# Patient Record
Sex: MALE | Race: OTHER | NOT HISPANIC OR LATINO | URBAN - METROPOLITAN AREA
[De-identification: names, ages, dates, MRNs, and addresses within clinical notes are randomized per-mention and may not be internally consistent; named-entity substitution may affect disease eponyms.]

---

## 2020-06-23 ENCOUNTER — OFFICE VISIT (OUTPATIENT)
Dept: URBAN - METROPOLITAN AREA CLINIC 97 | Facility: CLINIC | Age: 70
End: 2020-06-23

## 2020-06-23 ENCOUNTER — OFFICE VISIT (OUTPATIENT)
Dept: URBAN - METROPOLITAN AREA CLINIC 86 | Facility: CLINIC | Age: 70
End: 2020-06-23

## 2020-07-28 ENCOUNTER — OFFICE VISIT (OUTPATIENT)
Dept: URBAN - METROPOLITAN AREA CLINIC 97 | Facility: CLINIC | Age: 70
End: 2020-07-28

## 2020-09-08 ENCOUNTER — OFFICE VISIT (OUTPATIENT)
Dept: URBAN - METROPOLITAN AREA CLINIC 97 | Facility: CLINIC | Age: 70
End: 2020-09-08
Payer: COMMERCIAL

## 2020-09-08 DIAGNOSIS — K74.69 CIRRHOSIS, CRYPTOGENIC: ICD-10-CM

## 2020-09-08 PROCEDURE — 76705 ECHO EXAM OF ABDOMEN: CPT

## 2020-09-08 PROCEDURE — 93975 VASCULAR STUDY: CPT

## 2020-09-08 RX ORDER — ASPIRIN 81 MG/1
TAKE 1 TABLET (81 MG) BY ORAL ROUTE ONCE DAILY TABLET, COATED ORAL 1
Qty: 0 | Refills: 0 | Status: ACTIVE | COMMUNITY
Start: 1900-01-01 | End: 1900-01-01

## 2020-09-08 RX ORDER — METOPROLOL TARTRATE 25 MG/1
TAKE 1 TABLET (25 MG) BY ORAL ROUTE ONCE DAILY TABLET, FILM COATED ORAL 1
Qty: 0 | Refills: 0 | Status: ACTIVE | COMMUNITY
Start: 1900-01-01 | End: 1900-01-01

## 2020-09-08 RX ORDER — LOSARTAN POTASSIUM 100 MG/1
TAKE 1 TABLET (100 MG) BY ORAL ROUTE ONCE DAILY TABLET, FILM COATED ORAL 1
Qty: 0 | Refills: 0 | Status: ACTIVE | COMMUNITY
Start: 1900-01-01 | End: 1900-01-01

## 2020-09-08 RX ORDER — ATORVASTATIN CALCIUM 40 MG/1
TAKE 1 TABLET (40 MG) BY ORAL ROUTE ONCE DAILY TABLET, FILM COATED ORAL 1
Qty: 0 | Refills: 0 | Status: ACTIVE | COMMUNITY
Start: 1900-01-01 | End: 1900-01-01

## 2020-09-08 RX ORDER — CLOPIDOGREL BISULFATE 75 MG
TAKE 1 TABLET (75 MG) BY ORAL ROUTE ONCE DAILY TABLET ORAL 1
Qty: 0 | Refills: 0 | Status: ACTIVE | COMMUNITY
Start: 1900-01-01 | End: 1900-01-01

## 2020-09-15 ENCOUNTER — TELEPHONE ENCOUNTER (OUTPATIENT)
Dept: URBAN - METROPOLITAN AREA CLINIC 92 | Facility: CLINIC | Age: 70
End: 2020-09-15

## 2020-09-15 NOTE — HPI-TODAY'S VISIT:
Dear Mr Lima,  [place] labs: na 141 k 4.6 and glucose 109 slightly up and maybe not fasting. bun 12 and cr 0. 85 and alb 4.7 normal. tb 1.2 up from 0.7 but not fractionated. ast 39 and alt 36 (ast 35 and alt 31 prior). ca 9.6 normal. wbc 6.4 hg 14.6 plat 79 low and prior 92 and prior 52 so variable but low generally. inr 1.08.  Meld 7 and meld na 7 so both low.   Look forward to seeing you soon.  Dr Brady

## 2020-09-22 ENCOUNTER — LAB OUTSIDE AN ENCOUNTER (OUTPATIENT)
Dept: URBAN - METROPOLITAN AREA CLINIC 86 | Facility: CLINIC | Age: 70
End: 2020-09-22

## 2020-09-22 ENCOUNTER — OFFICE VISIT (OUTPATIENT)
Dept: URBAN - METROPOLITAN AREA CLINIC 86 | Facility: CLINIC | Age: 70
End: 2020-09-22
Payer: COMMERCIAL

## 2020-09-22 ENCOUNTER — TELEPHONE ENCOUNTER (OUTPATIENT)
Dept: URBAN - METROPOLITAN AREA CLINIC 6 | Facility: CLINIC | Age: 70
End: 2020-09-22

## 2020-09-22 DIAGNOSIS — E78.5 HYPERLIPIDEMIA: ICD-10-CM

## 2020-09-22 DIAGNOSIS — I10 HYPERTENSION: ICD-10-CM

## 2020-09-22 DIAGNOSIS — Z12.11 SCREEN FOR COLON CANCER: ICD-10-CM

## 2020-09-22 DIAGNOSIS — M67.40 GANGLION CYST: ICD-10-CM

## 2020-09-22 DIAGNOSIS — C91.10 CLL (CHRONIC LYMPHOCYTIC LEUKEMIA): ICD-10-CM

## 2020-09-22 DIAGNOSIS — E55.9 VITAMIN D DEFICIENCY: ICD-10-CM

## 2020-09-22 DIAGNOSIS — K76.0 FATTY LIVER: ICD-10-CM

## 2020-09-22 DIAGNOSIS — Z71.89 VACCINE COUNSELING: ICD-10-CM

## 2020-09-22 DIAGNOSIS — Z87.09 HX OF INTRINSIC ASTHMA: ICD-10-CM

## 2020-09-22 DIAGNOSIS — K74.69 OTHER CIRRHOSIS OF LIVER: ICD-10-CM

## 2020-09-22 PROCEDURE — G8417 CALC BMI ABV UP PARAM F/U: HCPCS

## 2020-09-22 PROCEDURE — G8427 DOCREV CUR MEDS BY ELIG CLIN: HCPCS

## 2020-09-22 PROCEDURE — 99214 OFFICE O/P EST MOD 30 MIN: CPT

## 2020-09-22 RX ORDER — CLOPIDOGREL BISULFATE 75 MG
TAKE 1 TABLET (75 MG) BY ORAL ROUTE ONCE DAILY TABLET ORAL 1
Qty: 0 | Refills: 0 | Status: ACTIVE | COMMUNITY
Start: 1900-01-01

## 2020-09-22 RX ORDER — ATORVASTATIN CALCIUM 40 MG/1
TAKE 1 TABLET (40 MG) BY ORAL ROUTE ONCE DAILY TABLET, FILM COATED ORAL 1
Qty: 0 | Refills: 0 | Status: ACTIVE | COMMUNITY
Start: 1900-01-01

## 2020-09-22 RX ORDER — ASPIRIN 81 MG/1
TAKE 1 TABLET (81 MG) BY ORAL ROUTE ONCE DAILY TABLET, COATED ORAL 1
Qty: 0 | Refills: 0 | Status: DISCONTINUED | COMMUNITY
Start: 1900-01-01

## 2020-09-22 RX ORDER — METOPROLOL TARTRATE 25 MG/1
TAKE 1 TABLET (25 MG) BY ORAL ROUTE ONCE DAILY TABLET, FILM COATED ORAL 1
Qty: 0 | Refills: 0 | Status: ACTIVE | COMMUNITY
Start: 1900-01-01

## 2020-09-22 RX ORDER — LOSARTAN POTASSIUM 100 MG/1
TAKE 1 TABLET (100 MG) BY ORAL ROUTE ONCE DAILY TABLET, FILM COATED ORAL 1
Qty: 0 | Refills: 0 | Status: ACTIVE | COMMUNITY
Start: 1900-01-01

## 2020-09-22 NOTE — HPI-TODAY'S VISIT:
This is a scheduled follow-up appointment for this patient, a 70 year old Other Race male, after a previous visit on jan 2020 for an evaluation for elevated liver enzymes and and fatty liver . A copy of this document will be sent to the referring provider.       he did a recent u/s-cirrhosis noted on imaging. no lesions. he did a Ct ab yesterday: * Final Report *  Reason For Exam Splenomegaly; h/o CLL;Splenomegaly;Cirrhosis of liver  REPORT EXAM: CT Abdomen w/ IV Contrast  CLINICAL INDICATION: Splenomegaly; h/o CLL;Splenomegaly;Cirrhosis of liver.  TECHNIQUE: Following administration of non-ionic IV contrast, postcontrast images through the abdomen were obtained. If applicable, point-of-care testing was approved following departmental protocol. [email or web address]  COMPARISON: 1/28/2019  FINDINGS:  Lower Thorax: The visualized lung bases are clear. Punctate pleural calcification on the left posteriorly. Coronary artery calcification.  Liver: Hepatic steatosis with a nodular surface contour and fissural widening, unchanged.  Gallbladder/Biliary Tree: Cholelithiasis. No biliary ductal dilatation or gallbladder wall thickening.  Spleen: The spleen measures 13.1 cm in length, previously 11.6 cm at the same level.  Pancreas: Unremarkable.  Adrenal Glands: Unremarkable.   Kidneys/Ureters: Symmetric renal enhancement without obstruction.  Gastrointestinal: No bowel obstruction. Mild, nonspecific wall thickening in the gastric antral pyloric region.   Lymph Nodes: Multiple retroperitoneal lymph nodes are slightly larger. One left para-aortic node (5:33) measures 13 mm short axis, previously 9 mm. One aortocaval node (5:36) measures 9 mm short axis, previously 5 mm. Additional nodes are slightly larger.  Vessels: Moderate atherosclerosis.  Peritoneum/Retroperitoneum: No free fluid.  Bones/Soft Tissues: Osteopenia with degenerative and postsurgical changes in the spine.  IMPRESSION:   1. Slight increase in the size of the spleen. 2. Slight increased size of multiple retroperitoneal lymph nodes. 3. Nonspecific thickening of the gastric antral pylorus wall. 4. Hepatic steatosis with a nodular surface contour and fissural widening consistent with chronic liver disease. seeing dr. friend next week to discuss this. recommend he see dr. norton for Nonspecific thickening of the gastric antral pylorus wall.  labs 9/15/20 na 141 k 4.6 gluc 109 cr 0.85 tb 1.2 alt 36 ast 39 alp 84  may 2020 labs ast 35 alt 31 tb 0.7 MELD score 8   RECAP: dec 2019 u.s patent vessels and mildly inc resistive index in hepatic artery and nonspecific. Increased/coarsened and similar to before and no lesions. Right kdiney 11.5cm and pancreas unremarable where seen. Spleen 12.8cm.   Dr Hobbs did labs: jan 14 2020 na 141 and k 3.9 and cl 107 and glu 144 and cr 0.7 and alb 4.5 and tb 0.7 and ast 38 and alt 46 and alk 102 and wbc 5.6 hg 14.5 plat 86.  Nov 2 2019 ast 65 and alk 97 and alt 64.  Oct 8 2019 ast 35 and alt 39 and alk 79.   Not on any chemo.   June 5 2019  u.s liver heterogeneous in echotexture and lobulated.  No def mass. Patent vessels. Gb normal and no stones.  CBD 2mm and Right idney 11.5x5.1x5.8cm.  Spleen 13.7cm.  Cirrhotic liver with mild splenomenagly.   May 2019 wbc 7 hg 11.0 plat 103  and mono 1.1% glu 67 and cr 0.8 na 145 and k 4.7 and cl 108 and co2 21, alb 4.8 and tb 0.5 and alk 91 and ast 37 and alt 24.   March 25 2019 wbc 6.1 hg 11.6 littlelow and plat 94.  glu 77 and cr 0.78 and na 144 and k 5 and cl 108 and co2 23 and alb 4.5 and tb 0.4 and alk 106 and ast 30 and alt 20, ideal alt <30, Inr 1.1. and b core total positive.  May 15 2019 mri  Document Type: MRI Abdomen w/ + w/o Contrast Document Date: May 13, 2019 16:57  Document Status: Auth (Verified) Document Title: MRI Abdomen w/ + w/o Contrast Performed By: Liliana Zamorano  Verified By: Liilana Zamorano on May 14, 2019 08:40  Encounter info: [id number], [facility], Single Visit OP, 5/13/2019 - 5/13/2019  * Final Report *  Reason For Exam Cirrhosis  REPORT EXAM: MRI Abdomen w/ + w/o Contrast  CLINICAL INDICATION: Cirrhosis.  TECHNIQUE: Multisequence, multiplanar MRI of the abdomen was performed without and with intravenous contrast. [facility].[id number]   CONTRAST: 14 cc of Prohance  COMPARISON: CT 1/28/2019 and MRI 6/20/2018  FINDINGS:  Lower Thorax: Normal.  Liver:  Mild surface nodularity. No arterially enhancing lesions. No lesions with capsule or washout.  Calculated fat percentage is 4.5%. R2 water is 22.3.  Gallbladder/Biliary Tree: Normal.  Spleen: Normal.  Pancreas: Normal.  Adrenal Glands: Normal.   Kidneys/Ureters: Normal.  Gastrointestinal: Normal.   Lymph Nodes: Retroperitoneal lymph nodes remain slightly increased in number. No node measures greater than 1 cm in short axis. For example a left para-aortic node measures 0.9 x 2.0 cm (series 5, image 31) compared to 1.0 cm in short axis on CT (series 3, image 35).   Vessels: Portal vein and hepatic veins are patent. Small gastroesophageal varices are present.  Peritoneum/Retroperitoneum: No ascites.  Bones/Soft Tissues: Normal.  IMPRESSION:     1.  Mild morphologic changes of chronic liver disease and suggestion of portal hypertension including gastroesophageal varices. 2.  No evidence for hepatocellular carcinoma. 3.  No enlarging lymph nodes.   Signature Line *** Final ***  Electronically Signed By:  Liliana Zamorano on  05/14/2019 08:40  Dictated by:  Liliana Zamorano  Doug 15 2019 no gallstones and some sludge in gb. No stones. Liver homogeneous and no focal lesions. Partially imaged pancreas unremarkable. Right kidney 11.6cm and no hydronephrosis. Spleen 14.4cm. Vessels patent.  Doug 15 2019 na 140 k 4.3 and cl 106 and co2 25 and bun 13 and cr 0.73 and alb 4.7 and tb 0.9 and alk 90 and ast 33 and alt 28 and wbc 5.5 hg 15.6 plat 72. inr 1.03.  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 Doing well from the surgery. He did with Dr Lio Sesay and there for 5 days.  He was ct a and did well.  No heart issues and did well.  He is  still back to full time work.  Has leg pain limiting and says doctor referred for 2nd opinion.  Nov 2018 labs wbc 7 hg 15.6 plat plat 79 and glu 111 and cr 0.8 and na 141 and k 4.0 and alb 4.7. tb 0.5 and alk 112 and ast 39 and alt 44 and inr 1.0. Meld is 6 and meld Na 6 so very low.   2018 did the egd grade 1 varices seen with Dr Norton and was to redo in 1yr and did oct 2019 and 2 columns garde 1 varices and redo 1 yr.  Mri June 19 2018 liver normal size and very subtle surface nodularity and no suspicious lesions and few stone sin the gb and no sig portal htn.   June 2018 u/s with liver echogenic and ? mild fatty changes and we had by prelim report some concerns so that was why did mri.   May 2018 wbc 9.5 hg 16.3 plat 90 and na 138 and k 4.1 and bun 13 cr 0.8 and tb 0.8 and alk 122 and ast 45 and alt 49 and inr 1.1.   3/3017 he did a colonoscopy with Dr Tomlin and colon appeared normal. Few diverticula seen. Nonbleeding int hemorrhoids seen. Redo 5-10 yrs.   Feb 2018 wbc 8.9 hg 15.9 plat 110 and inr 1.1.   Jan 23 2018 mri: normal size liver and subtle hepatic surface nodularity. Liver fat 11% and and no duct dilation. Small possble esophageal varices seen and small collaterals on right upper abdomen. Top normal spleen 12.7cm. Multilevel degenerative changes. Trace abdominal fluid. Still on losartan as no sig fluid seen on last scan and cautioned could retain fluid with this.   Sept 2017 labs: wbc 7.9 hg 15.5 plat 107. Monocytes 1.2 little up. Glu 94 cr 0.76 nad na 140, k 3.9, alb 4.8, tb 0.7, alk 153 ast 60 and alt 56.   Alt ideal <30. iron 19% and alpha one normal 126 and MM. thee neg, and he was hep b immune at 63.9. asma neg at 16. ceruloplasmin 21 normal and ferritin 33 and hep a immune. u.s   Re his CLL  Dr Dr Hobbs is following.  Not on any tx for this yet.  July 2017 at [facility] and diffuse fatty liver and tiny gallstones and no inflammation.   Pt sees Dr Ward. Hga1c he says recently was in around 6 range.   2016 B sag neg and hcv ab neg.   He has been on the atorvastatin 40mg po qd for several years.   No one else in the family with fatty liver. Mother and father had heart disease.   Lali farah talked that about 80% of diabetics have fatty liver and if the labs normal about 1/3 can go to cirrhosis over time.   Goal is to continue the tx of the diabetes and lipids and work on the factors that made the liver fatty and monitor his liver over time.   He says his weight is stable.    discussed new meds for fatty liver coming in 2020 and we will need to see what the options are then and if he qualifies but phtn may be issue.  My hope is that he will continue to work on his rf and the liver will be better as he is doing.  Limit apap to <2gm per day.  Dr Hobbs aware that he is b core pos and if immune suppression is being done needs meds to prevent reactivation and if not then we can just watch.

## 2020-10-05 ENCOUNTER — OFFICE VISIT (OUTPATIENT)
Dept: URBAN - METROPOLITAN AREA TELEHEALTH 2 | Facility: TELEHEALTH | Age: 70
End: 2020-10-05
Payer: COMMERCIAL

## 2020-10-05 DIAGNOSIS — K76.0 FATTY LIVER: ICD-10-CM

## 2020-10-05 DIAGNOSIS — K31.89 INTESTINAL METAPLASIA OF GASTRIC CARDIA: ICD-10-CM

## 2020-10-05 DIAGNOSIS — K74.60 CIRRHOSIS: ICD-10-CM

## 2020-10-05 DIAGNOSIS — K57.90 DIVERTICULOSIS: ICD-10-CM

## 2020-10-05 PROCEDURE — G8482 FLU IMMUNIZE ORDER/ADMIN: HCPCS | Performed by: INTERNAL MEDICINE

## 2020-10-05 PROCEDURE — G9903 PT SCRN TBCO ID AS NON USER: HCPCS | Performed by: INTERNAL MEDICINE

## 2020-10-05 PROCEDURE — G8427 DOCREV CUR MEDS BY ELIG CLIN: HCPCS | Performed by: INTERNAL MEDICINE

## 2020-10-05 PROCEDURE — 99214 OFFICE O/P EST MOD 30 MIN: CPT | Performed by: INTERNAL MEDICINE

## 2020-10-05 PROCEDURE — G8417 CALC BMI ABV UP PARAM F/U: HCPCS | Performed by: INTERNAL MEDICINE

## 2020-10-05 PROCEDURE — 1036F TOBACCO NON-USER: CPT | Performed by: INTERNAL MEDICINE

## 2020-10-05 PROCEDURE — 3017F COLORECTAL CA SCREEN DOC REV: CPT | Performed by: INTERNAL MEDICINE

## 2020-10-05 RX ORDER — ATORVASTATIN CALCIUM 40 MG/1
TAKE 1 TABLET (40 MG) BY ORAL ROUTE ONCE DAILY TABLET, FILM COATED ORAL 1
Qty: 0 | Refills: 0 | Status: ACTIVE | COMMUNITY
Start: 1900-01-01

## 2020-10-05 RX ORDER — CLOPIDOGREL BISULFATE 75 MG
TAKE 1 TABLET (75 MG) BY ORAL ROUTE ONCE DAILY TABLET ORAL 1
Qty: 0 | Refills: 0 | Status: ACTIVE | COMMUNITY
Start: 1900-01-01

## 2020-10-05 RX ORDER — LOSARTAN POTASSIUM 100 MG/1
TAKE 1 TABLET (100 MG) BY ORAL ROUTE ONCE DAILY TABLET, FILM COATED ORAL 1
Qty: 0 | Refills: 0 | Status: ACTIVE | COMMUNITY
Start: 1900-01-01

## 2020-10-05 RX ORDER — METOPROLOL TARTRATE 25 MG/1
TAKE 1 TABLET (25 MG) BY ORAL ROUTE ONCE DAILY TABLET, FILM COATED ORAL 1
Qty: 0 | Refills: 0 | Status: ACTIVE | COMMUNITY
Start: 1900-01-01

## 2020-10-05 NOTE — HPI-TODAY'S VISIT:
70yoM seeing Dr. Brady for elevated liver enzymes and and fatty liver US last month-cirrhosis noted  CT ab 9/2020:Hepatic steatosis with a nodular surface contour and fissural widening, unchanged. Nonspecific thickening of the gastric antral pylorus wall. EGD 10/15/2019 Dr. Tomlin: non bleeding grade 1 varices + gastritis; bx H pylori gastritis + intestinal metaplasia He has a long hx of nausea since his heart surgery. No vomiting and not related to eating. No GERD sx or dysphagia. No constipation, diarrhea or GIB. Colonoscopy 3/2018 diverticulosis and IH.  No FH of GI malignancy Labs 9/2020 Plt 79 AST 39 ALT 36  TB 1.2 AP 84 Cardiologist is Dr. Singer at [facility] [phone] plavix

## 2020-10-12 ENCOUNTER — TELEPHONE ENCOUNTER (OUTPATIENT)
Dept: URBAN - METROPOLITAN AREA CLINIC 92 | Facility: CLINIC | Age: 70
End: 2020-10-12

## 2021-01-05 ENCOUNTER — OFFICE VISIT (OUTPATIENT)
Dept: URBAN - METROPOLITAN AREA SURGERY CENTER 18 | Facility: SURGERY CENTER | Age: 71
End: 2021-01-05
Payer: COMMERCIAL

## 2021-01-05 ENCOUNTER — CLAIMS CREATED FROM THE CLAIM WINDOW (OUTPATIENT)
Dept: URBAN - METROPOLITAN AREA CLINIC 4 | Facility: CLINIC | Age: 71
End: 2021-01-05
Payer: COMMERCIAL

## 2021-01-05 DIAGNOSIS — I85.10 ESOPH VARICE OTHER DIS: ICD-10-CM

## 2021-01-05 DIAGNOSIS — K74.69 CIRRHOSIS, CRYPTOGENIC: ICD-10-CM

## 2021-01-05 DIAGNOSIS — K29.60 OTHER GASTRITIS WITHOUT BLEEDING: ICD-10-CM

## 2021-01-05 DIAGNOSIS — K31.89 ACQUIRED DEFORMITY OF DUODENUM: ICD-10-CM

## 2021-01-05 PROCEDURE — 88312 SPECIAL STAINS GROUP 1: CPT | Performed by: PATHOLOGY

## 2021-01-05 PROCEDURE — 88305 TISSUE EXAM BY PATHOLOGIST: CPT | Performed by: PATHOLOGY

## 2021-01-05 PROCEDURE — G8907 PT DOC NO EVENTS ON DISCHARG: HCPCS | Performed by: INTERNAL MEDICINE

## 2021-01-05 PROCEDURE — 43239 EGD BIOPSY SINGLE/MULTIPLE: CPT | Performed by: INTERNAL MEDICINE

## 2021-01-05 RX ORDER — CLOPIDOGREL BISULFATE 75 MG
TAKE 1 TABLET (75 MG) BY ORAL ROUTE ONCE DAILY TABLET ORAL 1
Qty: 0 | Refills: 0 | Status: ACTIVE | COMMUNITY
Start: 1900-01-01

## 2021-01-05 RX ORDER — METOPROLOL TARTRATE 25 MG/1
TAKE 1 TABLET (25 MG) BY ORAL ROUTE ONCE DAILY TABLET, FILM COATED ORAL 1
Qty: 0 | Refills: 0 | Status: ACTIVE | COMMUNITY
Start: 1900-01-01

## 2021-01-05 RX ORDER — LOSARTAN POTASSIUM 100 MG/1
TAKE 1 TABLET (100 MG) BY ORAL ROUTE ONCE DAILY TABLET, FILM COATED ORAL 1
Qty: 0 | Refills: 0 | Status: ACTIVE | COMMUNITY
Start: 1900-01-01

## 2021-01-05 RX ORDER — ATORVASTATIN CALCIUM 40 MG/1
TAKE 1 TABLET (40 MG) BY ORAL ROUTE ONCE DAILY TABLET, FILM COATED ORAL 1
Qty: 0 | Refills: 0 | Status: ACTIVE | COMMUNITY
Start: 1900-01-01

## 2021-01-07 ENCOUNTER — OFFICE VISIT (OUTPATIENT)
Dept: URBAN - METROPOLITAN AREA CLINIC 98 | Facility: CLINIC | Age: 71
End: 2021-01-07

## 2021-03-23 ENCOUNTER — OFFICE VISIT (OUTPATIENT)
Dept: URBAN - METROPOLITAN AREA CLINIC 86 | Facility: CLINIC | Age: 71
End: 2021-03-23

## 2021-03-23 ENCOUNTER — OFFICE VISIT (OUTPATIENT)
Dept: URBAN - METROPOLITAN AREA CLINIC 91 | Facility: CLINIC | Age: 71
End: 2021-03-23

## 2021-04-27 ENCOUNTER — TELEPHONE ENCOUNTER (OUTPATIENT)
Dept: URBAN - METROPOLITAN AREA CLINIC 92 | Facility: CLINIC | Age: 71
End: 2021-04-27

## 2021-04-27 ENCOUNTER — OFFICE VISIT (OUTPATIENT)
Dept: URBAN - METROPOLITAN AREA CLINIC 97 | Facility: CLINIC | Age: 71
End: 2021-04-27
Payer: COMMERCIAL

## 2021-04-27 DIAGNOSIS — K76.0 FATTY LIVER: ICD-10-CM

## 2021-04-27 DIAGNOSIS — K76.89 ABNORMAL LIVER FUNCTION: ICD-10-CM

## 2021-04-27 DIAGNOSIS — K80.20 CHOLELITHIASIS: ICD-10-CM

## 2021-04-27 PROCEDURE — 76705 ECHO EXAM OF ABDOMEN: CPT | Performed by: PHYSICIAN ASSISTANT

## 2021-04-27 PROCEDURE — 93975 VASCULAR STUDY: CPT | Performed by: PHYSICIAN ASSISTANT

## 2021-04-27 RX ORDER — LOSARTAN POTASSIUM 100 MG/1
TAKE 1 TABLET (100 MG) BY ORAL ROUTE ONCE DAILY TABLET, FILM COATED ORAL 1
Qty: 0 | Refills: 0 | Status: ACTIVE | COMMUNITY
Start: 1900-01-01

## 2021-04-27 RX ORDER — ATORVASTATIN CALCIUM 40 MG/1
TAKE 1 TABLET (40 MG) BY ORAL ROUTE ONCE DAILY TABLET, FILM COATED ORAL 1
Qty: 0 | Refills: 0 | Status: ACTIVE | COMMUNITY
Start: 1900-01-01

## 2021-04-27 RX ORDER — CLOPIDOGREL BISULFATE 75 MG
TAKE 1 TABLET (75 MG) BY ORAL ROUTE ONCE DAILY TABLET ORAL 1
Qty: 0 | Refills: 0 | Status: ACTIVE | COMMUNITY
Start: 1900-01-01

## 2021-04-27 RX ORDER — METOPROLOL TARTRATE 25 MG/1
TAKE 1 TABLET (25 MG) BY ORAL ROUTE ONCE DAILY TABLET, FILM COATED ORAL 1
Qty: 0 | Refills: 0 | Status: ACTIVE | COMMUNITY
Start: 1900-01-01

## 2021-04-27 NOTE — HPI-TODAY'S VISIT:
Dear Adamaris Lima, April 27 ultrasound shows the liver to be coarsened with mild nodularity but no discrete liver lesion.  Gallstones are seen but without any gallbladder wall thickening or pericholecystic fluid.  Common bile duct is 2.4 mm.  Pancreas appeared normal to them.  Right kidney appeared to be normal in echogenicity with no renal mass.  Spleen was 12.8 cm.  Liver vessels were patent.  Overall the liver appeared to be coarsened with no focal lesion.  Spleen was upper normal.  I wanted to clarify the size of your right kidney and will await that correction. Dr. Brady

## 2021-04-29 ENCOUNTER — TELEPHONE ENCOUNTER (OUTPATIENT)
Dept: URBAN - METROPOLITAN AREA CLINIC 92 | Facility: CLINIC | Age: 71
End: 2021-04-29

## 2021-05-05 ENCOUNTER — OFFICE VISIT (OUTPATIENT)
Dept: URBAN - METROPOLITAN AREA TELEHEALTH 2 | Facility: TELEHEALTH | Age: 71
End: 2021-05-05
Payer: COMMERCIAL

## 2021-05-05 DIAGNOSIS — E55.9 VITAMIN D DEFICIENCY: ICD-10-CM

## 2021-05-05 DIAGNOSIS — K76.0 FATTY LIVER: ICD-10-CM

## 2021-05-05 DIAGNOSIS — K74.60 CIRRHOSIS: ICD-10-CM

## 2021-05-05 DIAGNOSIS — R74.8 ABNORMAL LIVER ENZYMES: ICD-10-CM

## 2021-05-05 PROCEDURE — 99214 OFFICE O/P EST MOD 30 MIN: CPT

## 2021-05-05 RX ORDER — CLOPIDOGREL BISULFATE 75 MG
TAKE 1 TABLET (75 MG) BY ORAL ROUTE ONCE DAILY TABLET ORAL 1
Qty: 0 | Refills: 0 | Status: ON HOLD | COMMUNITY
Start: 1900-01-01

## 2021-05-05 RX ORDER — LOSARTAN POTASSIUM 100 MG/1
TAKE 1 TABLET (100 MG) BY ORAL ROUTE ONCE DAILY TABLET, FILM COATED ORAL 1
Qty: 0 | Refills: 0 | Status: ACTIVE | COMMUNITY
Start: 1900-01-01

## 2021-05-05 RX ORDER — ATORVASTATIN CALCIUM 40 MG/1
TAKE 1 TABLET (40 MG) BY ORAL ROUTE ONCE DAILY TABLET, FILM COATED ORAL 1
Qty: 0 | Refills: 0 | Status: ON HOLD | COMMUNITY
Start: 1900-01-01

## 2021-05-05 RX ORDER — METOPROLOL TARTRATE 25 MG/1
TAKE 1 TABLET (25 MG) BY ORAL ROUTE ONCE DAILY TABLET, FILM COATED ORAL 1
Qty: 0 | Refills: 0 | Status: ACTIVE | COMMUNITY
Start: 1900-01-01

## 2021-05-05 RX ORDER — ATORVASTATIN CALCIUM 20 MG/1
1 TABLET TABLET, FILM COATED ORAL ONCE A DAY
Status: ACTIVE | COMMUNITY

## 2021-05-05 RX ORDER — DAPAGLIFLOZIN 5 MG/1
1 TABLET TABLET, FILM COATED ORAL ONCE A DAY
Status: ACTIVE | COMMUNITY

## 2021-05-05 NOTE — HPI-TODAY'S VISIT:
This is a scheduled follow-up appointment for this patient, a 70 year old Other Race male, after a previous visit for an evaluation for elevated liver enzymes and and fatty liver . A copy of this document will be sent to the referring provider.         did TH visit, but switched to telephone due to connectivity issues. pt had elevated glucose levels and added another med, lfts were elevated recently and suspect due to glucose levels. will have him repeat labs in 1 month and prior to visit.  They corrected the right kidney size to 11.5cm. did egd in jan 2021 and grade 1 varices noted.  per dr. garnett note recommend repeat in july 2021 and reminded pt of this.   labs 4/23/21 tb 1.1 alt 49 alp 84 ast 45  April 27 ultrasound shows the liver to be coarsened with mild nodularity but no discrete liver lesion.  Gallstones are seen but without any gallbladder wall thickening or pericholecystic fluid.  Common bile duct is 2.4 mm.  Pancreas appeared normal to them.  Right kidney appeared to be normal in echogenicity with no renal mass.  Spleen was 12.8 cm.  Liver vessels were patent.  Overall the liver appeared to be coarsened with no focal lesion.  Spleen was upper normal.  I wanted to clarify the size of your right kidney and will await that correction.  RECAP: he did a recent u/s-cirrhosis noted on imaging. no lesions.  past CT * Final Report *  Reason For Exam Splenomegaly; h/o CLL;Splenomegaly;Cirrhosis of liver  REPORT EXAM: CT Abdomen w/ IV Contrast  CLINICAL INDICATION: Splenomegaly; h/o CLL;Splenomegaly;Cirrhosis of liver.  TECHNIQUE: Following administration of non-ionic IV contrast, postcontrast images through the abdomen were obtained. If applicable, point-of-care testing was approved following departmental protocol. [facility].[email or web address]  COMPARISON: 1/28/2019  FINDINGS:  Lower Thorax: The visualized lung bases are clear. Punctate pleural calcification on the left posteriorly. Coronary artery calcification.  Liver: Hepatic steatosis with a nodular surface contour and fissural widening, unchanged.  Gallbladder/Biliary Tree: Cholelithiasis. No biliary ductal dilatation or gallbladder wall thickening.  Spleen: The spleen measures 13.1 cm in length, previously 11.6 cm at the same level.  Pancreas: Unremarkable.  Adrenal Glands: Unremarkable.   Kidneys/Ureters: Symmetric renal enhancement without obstruction.  Gastrointestinal: No bowel obstruction. Mild, nonspecific wall thickening in the gastric antral pyloric region.   Lymph Nodes: Multiple retroperitoneal lymph nodes are slightly larger. One left para-aortic node (5:33) measures 13 mm short axis, previously 9 mm. One aortocaval node (5:36) measures 9 mm short axis, previously 5 mm. Additional nodes are slightly larger.  Vessels: Moderate atherosclerosis.  Peritoneum/Retroperitoneum: No free fluid.  Bones/Soft Tissues: Osteopenia with degenerative and postsurgical changes in the spine.  IMPRESSION:   1. Slight increase in the size of the spleen. 2. Slight increased size of multiple retroperitoneal lymph nodes. 3. Nonspecific thickening of the gastric antral pylorus wall. 4. Hepatic steatosis with a nodular surface contour and fissural widening consistent with chronic liver disease.    labs 9/15/20 na 141 k 4.6 gluc 109 cr 0.85 tb 1.2 alt 36 ast 39 alp 84  may 2020 labs ast 35 alt 31 tb 0.7 MELD score 8

## 2021-05-28 LAB
A/G RATIO: 2.6
ALBUMIN: 4.7
ALKALINE PHOSPHATASE: 108
ALT (SGPT): 50
AST (SGOT): 53
BASO (ABSOLUTE): 0.1
BASOS: 1
BILIRUBIN, TOTAL: 0.7
BUN/CREATININE RATIO: 16
BUN: 12
CALCIUM: 9.8
CARBON DIOXIDE, TOTAL: 20
CHLORIDE: 105
CREATININE: 0.76
EGFR IF AFRICN AM: 108
EGFR IF NONAFRICN AM: 93
EOS (ABSOLUTE): 0.2
EOS: 4
GLOBULIN, TOTAL: 1.8
GLUCOSE: 130
HEMATOCRIT: 46.5
HEMATOLOGY COMMENTS:: (no result)
HEMOGLOBIN: 15.3
IMMATURE CELLS: (no result)
IMMATURE GRANS (ABS): 0
IMMATURE GRANULOCYTES: 0
INR: 1.1
LYMPHS (ABSOLUTE): 2.5
LYMPHS: 38
MCH: 28.1
MCHC: 32.9
MCV: 85
MONOCYTES(ABSOLUTE): 0.7
MONOCYTES: 11
NEUTROPHILS (ABSOLUTE): 3
NEUTROPHILS: 46
NRBC: (no result)
PLATELETS: 86
POTASSIUM: 4.1
PROTEIN, TOTAL: 6.5
PROTHROMBIN TIME: 11.6
RBC: 5.45
RDW: 14.5
SODIUM: 139
WBC: 6.6

## 2021-06-02 ENCOUNTER — LAB OUTSIDE AN ENCOUNTER (OUTPATIENT)
Dept: URBAN - METROPOLITAN AREA TELEHEALTH 2 | Facility: TELEHEALTH | Age: 71
End: 2021-06-02

## 2021-08-05 ENCOUNTER — LAB OUTSIDE AN ENCOUNTER (OUTPATIENT)
Dept: URBAN - METROPOLITAN AREA TELEHEALTH 2 | Facility: TELEHEALTH | Age: 71
End: 2021-08-05

## 2021-08-12 ENCOUNTER — OFFICE VISIT (OUTPATIENT)
Dept: URBAN - METROPOLITAN AREA TELEHEALTH 2 | Facility: TELEHEALTH | Age: 71
End: 2021-08-12

## 2021-08-30 ENCOUNTER — OFFICE VISIT (OUTPATIENT)
Dept: URBAN - METROPOLITAN AREA CLINIC 86 | Facility: CLINIC | Age: 71
End: 2021-08-30

## 2021-09-21 ENCOUNTER — OFFICE VISIT (OUTPATIENT)
Dept: URBAN - METROPOLITAN AREA CLINIC 97 | Facility: CLINIC | Age: 71
End: 2021-09-21

## 2021-09-22 ENCOUNTER — LAB OUTSIDE AN ENCOUNTER (OUTPATIENT)
Dept: URBAN - METROPOLITAN AREA CLINIC 86 | Facility: CLINIC | Age: 71
End: 2021-09-22

## 2021-09-23 LAB
A/G RATIO: 2.2
ALBUMIN: 4.8
ALKALINE PHOSPHATASE: 116
ALT (SGPT): 39
AST (SGOT): 47
BASO (ABSOLUTE): 0.1
BASOS: 1
BILIRUBIN, TOTAL: 0.7
BUN/CREATININE RATIO: 14
BUN: 11
CALCIUM: 9.7
CARBON DIOXIDE, TOTAL: 21
CHLORIDE: 104
CREATININE: 0.79
EGFR IF AFRICN AM: 105
EGFR IF NONAFRICN AM: 91
EOS (ABSOLUTE): 0.3
EOS: 3
GLOBULIN, TOTAL: 2.2
GLUCOSE: 118
HEMATOCRIT: 50.8
HEMATOLOGY COMMENTS:: (no result)
HEMOGLOBIN: 16.2
IMMATURE CELLS: (no result)
IMMATURE GRANS (ABS): 0
IMMATURE GRANULOCYTES: 0
INR: 1.1
LYMPHS (ABSOLUTE): 3.1
LYMPHS: 35
MCH: 27.2
MCHC: 31.9
MCV: 85
MONOCYTES(ABSOLUTE): 0.9
MONOCYTES: 10
NEUTROPHILS (ABSOLUTE): 4.4
NEUTROPHILS: 51
NRBC: (no result)
PLATELETS: 63
POTASSIUM: 4.5
PROTEIN, TOTAL: 7
PROTHROMBIN TIME: 11.3
RBC: 5.95
RDW: 15.4
SODIUM: 144
WBC: 8.7

## 2021-09-27 ENCOUNTER — OFFICE VISIT (OUTPATIENT)
Dept: URBAN - METROPOLITAN AREA CLINIC 86 | Facility: CLINIC | Age: 71
End: 2021-09-27
Payer: COMMERCIAL

## 2021-09-27 VITALS
WEIGHT: 152.5 LBS | HEIGHT: 65 IN | SYSTOLIC BLOOD PRESSURE: 117 MMHG | TEMPERATURE: 91.7 F | DIASTOLIC BLOOD PRESSURE: 68 MMHG | BODY MASS INDEX: 25.41 KG/M2 | HEART RATE: 58 BPM

## 2021-09-27 DIAGNOSIS — E55.9 VITAMIN D DEFICIENCY: ICD-10-CM

## 2021-09-27 DIAGNOSIS — C91.10 CLL (CHRONIC LYMPHOCYTIC LEUKEMIA): ICD-10-CM

## 2021-09-27 DIAGNOSIS — R93.3 ABNORMAL CT SCAN, STOMACH: ICD-10-CM

## 2021-09-27 DIAGNOSIS — Z87.09 HX OF INTRINSIC ASTHMA: ICD-10-CM

## 2021-09-27 DIAGNOSIS — Z12.11 SCREEN FOR COLON CANCER: ICD-10-CM

## 2021-09-27 DIAGNOSIS — K57.90 DIVERTICULOSIS: ICD-10-CM

## 2021-09-27 DIAGNOSIS — Z71.89 VACCINE COUNSELING: ICD-10-CM

## 2021-09-27 DIAGNOSIS — Z79.899 HIGH RISK MEDICATION USE: ICD-10-CM

## 2021-09-27 DIAGNOSIS — E66.9 OBESITY: ICD-10-CM

## 2021-09-27 DIAGNOSIS — E78.5 HYPERLIPIDEMIA: ICD-10-CM

## 2021-09-27 DIAGNOSIS — R74.8 ABNORMAL LIVER ENZYMES: ICD-10-CM

## 2021-09-27 DIAGNOSIS — D69.6 THROMBOCYTOPENIA: ICD-10-CM

## 2021-09-27 DIAGNOSIS — K74.60 CIRRHOSIS: ICD-10-CM

## 2021-09-27 DIAGNOSIS — I10 HYPERTENSION: ICD-10-CM

## 2021-09-27 DIAGNOSIS — K76.0 FATTY LIVER: ICD-10-CM

## 2021-09-27 DIAGNOSIS — M67.40 GANGLION CYST: ICD-10-CM

## 2021-09-27 DIAGNOSIS — K31.89 INTESTINAL METAPLASIA OF GASTRIC CARDIA: ICD-10-CM

## 2021-09-27 DIAGNOSIS — E11.9 DIABETES: ICD-10-CM

## 2021-09-27 PROCEDURE — 99214 OFFICE O/P EST MOD 30 MIN: CPT

## 2021-09-27 RX ORDER — LOSARTAN POTASSIUM 100 MG/1
TAKE 1 TABLET (100 MG) BY ORAL ROUTE ONCE DAILY TABLET, FILM COATED ORAL 1
Qty: 0 | Refills: 0 | Status: ACTIVE | COMMUNITY
Start: 1900-01-01

## 2021-09-27 RX ORDER — ATORVASTATIN CALCIUM 20 MG/1
1 TABLET TABLET, FILM COATED ORAL ONCE A DAY
Status: ACTIVE | COMMUNITY

## 2021-09-27 RX ORDER — CALCIUM CARBONATE 500(1250)
2 TABLETS TABLET ORAL ONCE A DAY
Status: ACTIVE | COMMUNITY

## 2021-09-27 RX ORDER — ATORVASTATIN CALCIUM 40 MG/1
TAKE 1 TABLET (40 MG) BY ORAL ROUTE ONCE DAILY TABLET, FILM COATED ORAL 1
Qty: 0 | Refills: 0 | Status: ON HOLD | COMMUNITY
Start: 1900-01-01

## 2021-09-27 RX ORDER — METOPROLOL TARTRATE 25 MG/1
TAKE 1 TABLET (25 MG) BY ORAL ROUTE ONCE DAILY TABLET, FILM COATED ORAL 1
Qty: 0 | Refills: 0 | Status: ACTIVE | COMMUNITY
Start: 1900-01-01

## 2021-09-27 RX ORDER — CLOPIDOGREL BISULFATE 75 MG
TAKE 1 TABLET (75 MG) BY ORAL ROUTE ONCE DAILY TABLET ORAL 1
Qty: 0 | Refills: 0 | Status: ON HOLD | COMMUNITY
Start: 1900-01-01

## 2021-09-27 RX ORDER — DAPAGLIFLOZIN 5 MG/1
1 TABLET TABLET, FILM COATED ORAL ONCE A DAY
Status: ACTIVE | COMMUNITY

## 2021-09-27 NOTE — HPI-TODAY'S VISIT:
This is a scheduled follow-up appointment for this patient, a 70 year old Other Race male, after a previous visit on 5/5/21 with Ann Mckeon PA-C for an evaluation for elevated liver enzymes and and fatty liver . A copy of this document will be sent to the referring provider.         Pt doing well and feels better than he has.  Reports increased activity.    Reports better control with DM since starting Farxiga.  A1c approximately 6% per pt.  Does not require insulin use anymore and will likely be stopping metformin soon.  Weight down 2 lbs since last OV. HLD controlled with lipitor 20 mg.   He does consume alcohol 1 drink per month, encouraged to stop alcohol entirely.   Received COVID Vaccine x 2 (moderna).  did egd in jan 2021 and grade 1 varices noted.  per dr. garnett note recommend repeat in july 2021 and reminded pt of this.   Last imaging was US 4/27/21- needs repeat in 6 months (Oct 2021).  Denies abdominal distention, weight gain, confusion, GI bleeding, fevers/chills, abdominal pain.     labs 9/22/21- glucose 118 elevated, creatinine 0.79, tbili 0.7, alk phos 116, ast 47 (prev 53), alt 39 (prev 50)- ideal is less than 35, wbc 8.7, rbc 5.95 (please show primary MD), platelets 63 (prev 86), inr 1.1, sodium 144, MELD 7, MELD-Na 7  labs 4/23/21 tb 1.1 alt 49 alp 84 ast 45  April 27 ultrasound shows the liver to be coarsened with mild nodularity but no discrete liver lesion.  Gallstones are seen but without any gallbladder wall thickening or pericholecystic fluid.  Common bile duct is 2.4 mm.  Pancreas appeared normal to them.  Right kidney appeared to be normal in echogenicity with no renal mass.  Spleen was 12.8 cm.  Liver vessels were patent.  Overall the liver appeared to be coarsened with no focal lesion.  Spleen was upper normal.  I wanted to clarify the size of your right kidney and will await that correction.   They corrected the right kidney size to 11.5cm.  RECAP: he did a recent u/s-cirrhosis noted on imaging. no lesions.  past CT * Final Report *  Reason For Exam Splenomegaly; h/o CLL;Splenomegaly;Cirrhosis of liver  REPORT EXAM: CT Abdomen w/ IV Contrast  CLINICAL INDICATION: Splenomegaly; h/o CLL;Splenomegaly;Cirrhosis of liver.  TECHNIQUE: Following administration of non-ionic IV contrast, postcontrast images through the abdomen were obtained. If applicable, point-of-care testing was approved following departmental protocol. [facility].1.7.1  COMPARISON: 1/28/2019  FINDINGS:  Lower Thorax: The visualized lung bases are clear. Punctate pleural calcification on the left posteriorly. Coronary artery calcification.  Liver: Hepatic steatosis with a nodular surface contour and fissural widening, unchanged.  Gallbladder/Biliary Tree: Cholelithiasis. No biliary ductal dilatation or gallbladder wall thickening.  Spleen: The spleen measures 13.1 cm in length, previously 11.6 cm at the same level.  Pancreas: Unremarkable.  Adrenal Glands: Unremarkable.   Kidneys/Ureters: Symmetric renal enhancement without obstruction.  Gastrointestinal: No bowel obstruction. Mild, nonspecific wall thickening in the gastric antral pyloric region.   Lymph Nodes: Multiple retroperitoneal lymph nodes are slightly larger. One left para-aortic node (5:33) measures 13 mm short axis, previously 9 mm. One aortocaval node (5:36) measures 9 mm short axis, previously 5 mm. Additional nodes are slightly larger.  Vessels: Moderate atherosclerosis.  Peritoneum/Retroperitoneum: No free fluid.  Bones/Soft Tissues: Osteopenia with degenerative and postsurgical changes in the spine.  IMPRESSION:   1. Slight increase in the size of the spleen. 2. Slight increased size of multiple retroperitoneal lymph nodes. 3. Nonspecific thickening of the gastric antral pylorus wall. 4. Hepatic steatosis with a nodular surface contour and fissural widening consistent with chronic liver disease.    labs 9/15/20 na 141 k 4.6 gluc 109 cr 0.85 tb 1.2 alt 36 ast 39 alp 84  may 2020 labs ast 35 alt 31 tb 0.7 MELD score 8

## 2021-09-27 NOTE — EXAM-PHYSICAL EXAM
General: pleasant, well developed, well nourished, no acute distress, non ill appearing Eyes- no scleral icterus HENT: normocephalic, atraumatic head, face mask covering mouth and nose Neck: supple, no JVD Chest: normal breath sounds, normal work of breathing Heart: regular rate and rhythm without murmur Abdomen: soft, non tender, non distended, bowel sounds present, no hepatomegaly, no splenomegaly, no ascites Musculoskeletal: normal gait and station Extremities: no edema, no asterixis, no palmar erythema Skin: no rashes, no jaundice Neurologic: no focal deficits, alert and oriented x 3 Psychiatric: stable mood, appropriate affect

## 2021-11-04 ENCOUNTER — OFFICE VISIT (OUTPATIENT)
Dept: URBAN - METROPOLITAN AREA CLINIC 91 | Facility: CLINIC | Age: 71
End: 2021-11-04

## 2021-12-27 ENCOUNTER — LAB OUTSIDE AN ENCOUNTER (OUTPATIENT)
Dept: URBAN - METROPOLITAN AREA CLINIC 86 | Facility: CLINIC | Age: 71
End: 2021-12-27

## 2022-03-27 ENCOUNTER — LAB OUTSIDE AN ENCOUNTER (OUTPATIENT)
Dept: URBAN - METROPOLITAN AREA CLINIC 86 | Facility: CLINIC | Age: 72
End: 2022-03-27

## 2022-04-22 ENCOUNTER — OFFICE VISIT (OUTPATIENT)
Dept: URBAN - METROPOLITAN AREA CLINIC 91 | Facility: CLINIC | Age: 72
End: 2022-04-22
Payer: COMMERCIAL

## 2022-04-22 ENCOUNTER — OFFICE VISIT (OUTPATIENT)
Dept: URBAN - METROPOLITAN AREA CLINIC 86 | Facility: CLINIC | Age: 72
End: 2022-04-22
Payer: COMMERCIAL

## 2022-04-22 VITALS
BODY MASS INDEX: 25.99 KG/M2 | DIASTOLIC BLOOD PRESSURE: 62 MMHG | HEIGHT: 65 IN | TEMPERATURE: 97.4 F | WEIGHT: 156 LBS | SYSTOLIC BLOOD PRESSURE: 102 MMHG | HEART RATE: 58 BPM

## 2022-04-22 DIAGNOSIS — K74.69 OTHER CIRRHOSIS OF LIVER: ICD-10-CM

## 2022-04-22 DIAGNOSIS — E78.5 HYPERLIPIDEMIA: ICD-10-CM

## 2022-04-22 DIAGNOSIS — K76.0 FATTY LIVER: ICD-10-CM

## 2022-04-22 DIAGNOSIS — C91.10 CLL (CHRONIC LYMPHOCYTIC LEUKEMIA): ICD-10-CM

## 2022-04-22 DIAGNOSIS — K80.20 CHOLELITHIASIS: ICD-10-CM

## 2022-04-22 DIAGNOSIS — R16.1 SPLENOMEGALY: ICD-10-CM

## 2022-04-22 PROCEDURE — 93975 VASCULAR STUDY: CPT

## 2022-04-22 PROCEDURE — 99214 OFFICE O/P EST MOD 30 MIN: CPT

## 2022-04-22 PROCEDURE — 76705 ECHO EXAM OF ABDOMEN: CPT

## 2022-04-22 RX ORDER — CALCIUM CARBONATE 500(1250)
2 TABLETS TABLET ORAL ONCE A DAY
Status: ACTIVE | COMMUNITY

## 2022-04-22 RX ORDER — ATORVASTATIN CALCIUM 40 MG/1
TAKE 1 TABLET (40 MG) BY ORAL ROUTE ONCE DAILY TABLET, FILM COATED ORAL 1
Qty: 0 | Refills: 0 | Status: DISCONTINUED | COMMUNITY
Start: 1900-01-01

## 2022-04-22 RX ORDER — CLOPIDOGREL BISULFATE 75 MG
TAKE 1 TABLET (75 MG) BY ORAL ROUTE ONCE DAILY TABLET ORAL 1
Qty: 0 | Refills: 0 | Status: DISCONTINUED | COMMUNITY
Start: 1900-01-01

## 2022-04-22 RX ORDER — INSULIN LISPRO 100 [IU]/ML
(BG-100)/40 = #UNITS FOR BLOOG GLUCOSE GREATER THAN 180 SUBCUTANEOUSLY BEFORE BREAKFAST, LUNCH, AND DINNER INJECTION, SOLUTION INTRAVENOUS; SUBCUTANEOUS
Qty: 10 | Refills: 0 | Status: ACTIVE | COMMUNITY

## 2022-04-22 RX ORDER — DAPAGLIFLOZIN 5 MG/1
1 TABLET TABLET, FILM COATED ORAL ONCE A DAY
Status: ACTIVE | COMMUNITY

## 2022-04-22 RX ORDER — LOSARTAN POTASSIUM 100 MG/1
TAKE 1 TABLET (100 MG) BY ORAL ROUTE ONCE DAILY TABLET, FILM COATED ORAL 1
Qty: 0 | Refills: 0 | Status: ACTIVE | COMMUNITY
Start: 1900-01-01

## 2022-04-22 RX ORDER — ATORVASTATIN CALCIUM 20 MG/1
1 TABLET TABLET, FILM COATED ORAL ONCE A DAY
Status: ACTIVE | COMMUNITY

## 2022-04-22 RX ORDER — METOPROLOL TARTRATE 25 MG/1
TAKE 1 TABLET (25 MG) BY ORAL ROUTE ONCE DAILY TABLET, FILM COATED ORAL 1
Qty: 0 | Refills: 0 | Status: ACTIVE | COMMUNITY
Start: 1900-01-01

## 2022-04-22 RX ORDER — SYRINGE-NEEDLE,INSULIN,0.5 ML 30 G X1/2"
USE 3 SYRINGES A DAY TO INJECT INSULIN SYRINGE, EMPTY DISPOSABLE MISCELLANEOUS
Qty: 100 | Refills: 0 | Status: ACTIVE | COMMUNITY

## 2022-04-22 NOTE — HPI-TODAY'S VISIT:
This is a scheduled follow-up appointment for this patient, a 71 year male, after a previous visit on Sept 21 withMs Marlo KULKARNI for an evaluation for elevated liver enzymes and and fatty liver and cirrhosis.   A copy of this document will be sent to the referring provider.         He did the [facility].s today.  [facility]  He is feeling well.  Not on any active other tx.   Reports better control with DM since starting Farxiga 10mg    A1c running at approximately 6% per pt.  Does not require regular insulin just prn  and off the metformin.    Weight stable.   HLD controlled with lipitor 20 mg.     He does consume alcohol 1-2  drink per year and was encouraged to stop alcohol entirely.    Received COVID Vaccine x 4 (moderna).    Did egd in jan 2021 and grade 1 varices noted.  Per dr. garnett note recommend repeat in july 2022 and he was reminded pt of this.     US 4/27/21- needs repeat in 6 months (Oct 2021).  Denies abdominal distention, weight gain, confusion, GI bleeding, fevers/chills, abdominal pain.    9/22/21- glucose 118 elevated, creatinine 0.79, tbili 0.7, alk phos 116, ast 47 (prev 53), alt 39 (prev 50)- ideal is less than 35, wbc 8.7, rbc 5.95 (please show primary MD), platelets 63 (prev 86), inr 1.1, sodium 144, MELD 7, MELD-Na 7  4/23/21 tb 1.1 alt 49 alp 84 ast 45  April 27 ultrasound shows the liver to be coarsened with mild nodularity but no discrete liver lesion.  Gallstones are seen but without any gallbladder wall thickening or pericholecystic fluid.  Common bile duct is 2.4 mm.  Pancreas appeared normal to them.  Right kidney appeared to be normal in echogenicity with no renal mass.  Spleen was 12.8 cm.  Liver vessels were patent.  Overall the liver appeared to be coarsened with no focal lesion.  Spleen was upper normal.  I wanted to clarify the size of your right kidney and will await that correction.   They corrected the right kidney size to 11.5cm.  RECAP: he did a recent u/s-cirrhosis noted on imaging. no lesions.  past CT * Final Report *  Reason For Exam Splenomegaly; h/o CLL;Splenomegaly;Cirrhosis of liver  REPORT EXAM: CT Abdomen w/ IV Contrast  CLINICAL INDICATION: Splenomegaly; h/o CLL;Splenomegaly;Cirrhosis of liver.  TECHNIQUE: Following administration of non-ionic IV contrast, postcontrast images through the abdomen were obtained. If applicable, point-of-care testing was approved following departmental protocol. [facility].1.7.1  COMPARISON: 1/28/2019  FINDINGS:  Lower Thorax: The visualized lung bases are clear. Punctate pleural calcification on the left posteriorly. Coronary artery calcification.  Liver: Hepatic steatosis with a nodular surface contour and fissural widening, unchanged.  Gallbladder/Biliary Tree: Cholelithiasis. No biliary ductal dilatation or gallbladder wall thickening.  Spleen: The spleen measures 13.1 cm in length, previously 11.6 cm at the same level.  Pancreas: Unremarkable.  Adrenal Glands: Unremarkable.   Kidneys/Ureters: Symmetric renal enhancement without obstruction.  Gastrointestinal: No bowel obstruction. Mild, nonspecific wall thickening in the gastric antral pyloric region.   Lymph Nodes: Multiple retroperitoneal lymph nodes are slightly larger. One left para-aortic node (5:33) measures 13 mm short axis, previously 9 mm. One aortocaval node (5:36) measures 9 mm short axis, previously 5 mm. Additional nodes are slightly larger.  Vessels: Moderate atherosclerosis.  Peritoneum/Retroperitoneum: No free fluid.  Bones/Soft Tissues: Osteopenia with degenerative and postsurgical changes in the spine.  IMPRESSION:   1. Slight increase in the size of the spleen. 2. Slight increased size of multiple retroperitoneal lymph nodes. 3. Nonspecific thickening of the gastric antral pylorus wall. 4. Hepatic steatosis with a nodular surface contour and fissural widening consistent with chronic liver disease.    9/15/20 na 141 k 4.6 gluc 109 cr 0.85 tb 1.2 alt 36 ast 39 alp 84  may 2020 labs ast 35 alt 31 tb 0.7 MELD score 8  Did Mri with Dr Anjel a month ago and we will call to that.  Stressed to pt the need for social distancing and strict handwashing and wearing a mask and to follow any other new or added CDC recommendations as this is an evolving target.  Duration of the visit was32 minutes with 10 minutes of chart prep and 22 minutes for the face to face today  with time spent reviewing their prior and recent records and labs and discussing their current status and future plans for care for the patient.

## 2022-04-22 NOTE — EXAM-PHYSICAL EXAM
Gen: awake and responsive. Eyes: anicteric, normal lids. Mouth: covered with mask. Nose: covered with mask. Hearing: intact grossly. Neck: trachea midline and no jvd. CV: RRR no s3. Lungs: clear. No wheezes, Abd: Soft, nabs, NR, NT.Spleen tip trace palpable. Ext: no sig edema, some palm erythema. Neuro: moves all 4 ext grossly. No asterixis. Skin: no pruritis and some palm erythema.

## 2022-05-02 ENCOUNTER — TELEPHONE ENCOUNTER (OUTPATIENT)
Dept: URBAN - METROPOLITAN AREA CLINIC 92 | Facility: CLINIC | Age: 72
End: 2022-05-02

## 2022-05-02 NOTE — HPI-TODAY'S VISIT:
Blake Lima, April 22 ultrasound shows the liver to have a cirrhotic morphology.  It appears to be heterogeneous in echotexture with mild lobulation of the contour. Common bile duct measured 4 mm. There was an echogenic focus in the dependent portion of the gallbladder without shadowing that they felt could be a small stone. No ascites was seen. Pancreas were visualized appeared unremarkable. Right kidney measured 11.5 cm and had normal echogenicity and no hydronephrosis. Liver vessels were patent. Overall they felt that you had a cirrhotic appearing liver with patent vessels and mild splenomegaly.  Gallstones seen but without mention of inflammation. We plan to redo this in 6 months Dr. Brady

## 2022-05-03 NOTE — PHYSICAL EXAM GASTROINTESTINAL
Addended by: DEBI INTERIANO on: 5/3/2022 10:40 AM     Modules accepted: Orders     Self Exam: Abdomen soft, non-tender to palpatation, non-distended

## 2022-10-03 ENCOUNTER — LAB OUTSIDE AN ENCOUNTER (OUTPATIENT)
Dept: URBAN - METROPOLITAN AREA CLINIC 86 | Facility: CLINIC | Age: 72
End: 2022-10-03

## 2022-10-25 ENCOUNTER — OFFICE VISIT (OUTPATIENT)
Dept: URBAN - METROPOLITAN AREA CLINIC 86 | Facility: CLINIC | Age: 72
End: 2022-10-25
Payer: COMMERCIAL

## 2022-10-25 ENCOUNTER — OFFICE VISIT (OUTPATIENT)
Dept: URBAN - METROPOLITAN AREA CLINIC 91 | Facility: CLINIC | Age: 72
End: 2022-10-25
Payer: COMMERCIAL

## 2022-10-25 VITALS
BODY MASS INDEX: 25.83 KG/M2 | DIASTOLIC BLOOD PRESSURE: 64 MMHG | SYSTOLIC BLOOD PRESSURE: 108 MMHG | HEART RATE: 64 BPM | TEMPERATURE: 97.2 F | HEIGHT: 65 IN | WEIGHT: 155 LBS

## 2022-10-25 DIAGNOSIS — Z79.899 HIGH RISK MEDICATION USE: ICD-10-CM

## 2022-10-25 DIAGNOSIS — C91.10 CLL (CHRONIC LYMPHOCYTIC LEUKEMIA): ICD-10-CM

## 2022-10-25 DIAGNOSIS — M67.40 GANGLION CYST: ICD-10-CM

## 2022-10-25 DIAGNOSIS — E11.9 DIABETES: ICD-10-CM

## 2022-10-25 DIAGNOSIS — K80.20 BILIARY STONE: ICD-10-CM

## 2022-10-25 DIAGNOSIS — E78.5 HYPERLIPIDEMIA: ICD-10-CM

## 2022-10-25 DIAGNOSIS — Z71.89 VACCINE COUNSELING: ICD-10-CM

## 2022-10-25 DIAGNOSIS — D69.6 THROMBOCYTOPENIA: ICD-10-CM

## 2022-10-25 DIAGNOSIS — K31.89 INTESTINAL METAPLASIA OF GASTRIC CARDIA: ICD-10-CM

## 2022-10-25 DIAGNOSIS — K57.90 DIVERTICULOSIS: ICD-10-CM

## 2022-10-25 DIAGNOSIS — Z12.11 SCREEN FOR COLON CANCER: ICD-10-CM

## 2022-10-25 DIAGNOSIS — E66.9 OBESITY: ICD-10-CM

## 2022-10-25 DIAGNOSIS — K76.0 FATTY LIVER: ICD-10-CM

## 2022-10-25 DIAGNOSIS — K76.89 ABNORMAL LIVER FUNCTION: ICD-10-CM

## 2022-10-25 DIAGNOSIS — E55.9 VITAMIN D DEFICIENCY: ICD-10-CM

## 2022-10-25 DIAGNOSIS — Z87.09 HX OF INTRINSIC ASTHMA: ICD-10-CM

## 2022-10-25 DIAGNOSIS — R16.1 SPLENOMEGALY: ICD-10-CM

## 2022-10-25 DIAGNOSIS — K74.60 CIRRHOSIS: ICD-10-CM

## 2022-10-25 DIAGNOSIS — R93.3 ABNORMAL CT SCAN, STOMACH: ICD-10-CM

## 2022-10-25 DIAGNOSIS — I10 HYPERTENSION: ICD-10-CM

## 2022-10-25 DIAGNOSIS — R74.8 ABNORMAL LIVER ENZYMES: ICD-10-CM

## 2022-10-25 PROBLEM — 718346009: Status: ACTIVE | Noted: 2020-10-05

## 2022-10-25 PROBLEM — 397881000: Status: ACTIVE | Noted: 2020-10-05

## 2022-10-25 PROBLEM — 129679001: Status: ACTIVE | Noted: 2020-10-05

## 2022-10-25 PROCEDURE — 76705 ECHO EXAM OF ABDOMEN: CPT

## 2022-10-25 PROCEDURE — 93975 VASCULAR STUDY: CPT

## 2022-10-25 PROCEDURE — 99214 OFFICE O/P EST MOD 30 MIN: CPT

## 2022-10-25 RX ORDER — DAPAGLIFLOZIN 5 MG/1
1 TABLET TABLET, FILM COATED ORAL ONCE A DAY
Status: ACTIVE | COMMUNITY

## 2022-10-25 RX ORDER — INSULIN LISPRO 100 [IU]/ML
(BG-100)/40 = #UNITS FOR BLOOG GLUCOSE GREATER THAN 180 SUBCUTANEOUSLY BEFORE BREAKFAST, LUNCH, AND DINNER INJECTION, SOLUTION INTRAVENOUS; SUBCUTANEOUS
Qty: 10 | Refills: 0 | Status: ACTIVE | COMMUNITY

## 2022-10-25 RX ORDER — METOPROLOL TARTRATE 25 MG/1
TAKE 1 TABLET (25 MG) BY ORAL ROUTE ONCE DAILY TABLET, FILM COATED ORAL 1
Qty: 0 | Refills: 0 | Status: ACTIVE | COMMUNITY
Start: 1900-01-01

## 2022-10-25 RX ORDER — ATORVASTATIN CALCIUM 20 MG/1
1 TABLET TABLET, FILM COATED ORAL ONCE A DAY
Status: ACTIVE | COMMUNITY

## 2022-10-25 RX ORDER — LOSARTAN POTASSIUM 100 MG/1
TAKE 1 TABLET (100 MG) BY ORAL ROUTE ONCE DAILY TABLET, FILM COATED ORAL 1
Qty: 0 | Refills: 0 | Status: ACTIVE | COMMUNITY
Start: 1900-01-01

## 2022-10-25 RX ORDER — SYRINGE-NEEDLE,INSULIN,0.5 ML 30 G X1/2"
USE 3 SYRINGES A DAY TO INJECT INSULIN SYRINGE, EMPTY DISPOSABLE MISCELLANEOUS
Qty: 100 | Refills: 0 | Status: ACTIVE | COMMUNITY

## 2022-10-25 RX ORDER — CALCIUM CARBONATE 500(1250)
2 TABLETS TABLET ORAL ONCE A DAY
Status: ACTIVE | COMMUNITY

## 2022-10-25 NOTE — HPI-TODAY'S VISIT:
This is a scheduled follow-up appointment for this patient, a 72 year male, after a previous visit on April 2022   for an evaluation for elevated liver enzymes and and fatty liver and cirrhosis.   A copy of this document will be sent to the referring provider.         Did u.s today and pending.  He did labs today and pending.   Mri: april 11 2022: liver with chronic liver disease and lobar distribution and nodular contour. No hcc. Vessel patent. varices near esoph and stomach and spleen. spleen 14.5cm. Pancreas normal. Enalrged ln seen and mildly inc. Comaptible with CLL.   April 22 ultrasound shows the liver to have a cirrhotic morphology.  It appears to be heterogeneous in echotexture with mild lobulation of the contour. Common bile duct measured 4 mm. There was an echogenic focus in the dependent portion of the gallbladder without shadowing that they felt could be a small stone. No ascites was seen. Pancreas were visualized appeared unremarkable. Right kidney measured 11.5 cm and had normal echogenicity and no hydronephrosis. Liver vessels were patent. Overall they felt that you had a cirrhotic appearing liver with patent vessels and mild splenomegaly.  Gallstones seen but without mention of inflammation. We plan to redo this in 6 months  Hematology is watching him for now with Dr Hobbs and he is to see her next month.  feb 11 2022 na 138 and k 4 and cl 104 and co2 27 and rosa 16 and cr 0.82 and alb 4.4 and tb 1.1 and alk 77 and ast 38 and alt 37 and ldh 228. wbc 7.4 hg 16.4 plat 59. No inr.  Reports continued better control with DM since starting Farxiga 10mg    A1c running at approximately still at 6% per pt.  Does not require regular insulin just prn  and off the metformin.    Weight stable and he is weighing 155 and at 148.  HLD controlled with lipitor 20 mg.     He does consume alcohol 1-2  drink per year and was encouraged to stop alcohol entirely if possible.  Received COVID Vaccine x 4 (moderna).    Did egd in jan 2021 and grade 1 varices noted.  Per dr. garnett note recommend repeat in july 2022 and he has not done this. Dr Garnett and he has to make it.     US 4/27/21- needs repeat in 6 months (Oct 2021).  Denies abdominal distention, weight gain, confusion, GI bleeding, fevers/chills, abdominal pain.    9/22/21- glucose 118 elevated, creatinine 0.79, tbili 0.7, alk phos 116, ast 47 (prev 53), alt 39 (prev 50)- ideal is less than 35, wbc 8.7, rbc 5.95 (please show primary MD), platelets 63 (prev 86), inr 1.1, sodium 144, MELD 7, MELD-Na 7  4/23/21 tb 1.1 alt 49 alp 84 ast 45  April 27 ultrasound shows the liver to be coarsened with mild nodularity but no discrete liver lesion.  Gallstones are seen but without any gallbladder wall thickening or pericholecystic fluid.  Common bile duct is 2.4 mm.  Pancreas appeared normal to them.  Right kidney appeared to be normal in echogenicity with no renal mass.  Spleen was 12.8 cm.  Liver vessels were patent.  Overall the liver appeared to be coarsened with no focal lesion.  Spleen was upper normal.  I wanted to clarify the size of your right kidney and will await that correction.   They corrected the right kidney size to 11.5cm.  RECAP: he did a recent u/s-cirrhosis noted on imaging. no lesions.  past CT * Final Report *  Reason For Exam Splenomegaly; h/o CLL;Splenomegaly;Cirrhosis of liver  REPORT EXAM: CT Abdomen w/ IV Contrast  CLINICAL INDICATION: Splenomegaly; h/o CLL;Splenomegaly;Cirrhosis of liver.  TECHNIQUE: Following administration of non-ionic IV contrast, postcontrast images through the abdomen were obtained. If applicable, point-of-care testing was approved following departmental protocol. [facility].[id number]  COMPARISON: 1/28/2019  FINDINGS:  Lower Thorax: The visualized lung bases are clear. Punctate pleural calcification on the left posteriorly. Coronary artery calcification.  Liver: Hepatic steatosis with a nodular surface contour and fissural widening, unchanged.  Gallbladder/Biliary Tree: Cholelithiasis. No biliary ductal dilatation or gallbladder wall thickening.  Spleen: The spleen measures 13.1 cm in length, previously 11.6 cm at the same level.  Pancreas: Unremarkable.  Adrenal Glands: Unremarkable.   Kidneys/Ureters: Symmetric renal enhancement without obstruction.  Gastrointestinal: No bowel obstruction. Mild, nonspecific wall thickening in the gastric antral pyloric region.   Lymph Nodes: Multiple retroperitoneal lymph nodes are slightly larger. One left para-aortic node (5:33) measures 13 mm short axis, previously 9 mm. One aortocaval node (5:36) measures 9 mm short axis, previously 5 mm. Additional nodes are slightly larger.  Vessels: Moderate atherosclerosis.  Peritoneum/Retroperitoneum: No free fluid.  Bones/Soft Tissues: Osteopenia with degenerative and postsurgical changes in the spine.  IMPRESSION:   1. Slight increase in the size of the spleen. 2. Slight increased size of multiple retroperitoneal lymph nodes. 3. Nonspecific thickening of the gastric antral pylorus wall. 4. Hepatic steatosis with a nodular surface contour and fissural widening consistent with chronic liver disease.    9/15/20 na 141 k 4.6 gluc 109 cr 0.85 tb 1.2 alt 36 ast 39 alp 84  may 2020 labs ast 35 alt 31 tb 0.7 MELD score 8  Plan: 1. We will get the u.s report. 2. We will await the labs and meld. 3. See in 6m with labs and u.s. 4. reminded to do the egd with Dr Garnett.  Stressed to pt the need for social distancing and strict handwashing and wearing a mask and to follow any other new or added CDC recommendations as this is an evolving target.  Duration of the visit was 35 minutes with 10 minutes of chart prep and 25 minutes for the face to face today  with time spent reviewing their prior and recent records and labs and discussing their current status and future plans for care for the patient.

## 2022-10-29 ENCOUNTER — TELEPHONE ENCOUNTER (OUTPATIENT)
Dept: URBAN - METROPOLITAN AREA CLINIC 92 | Facility: CLINIC | Age: 72
End: 2022-10-29

## 2022-10-29 NOTE — HPI-TODAY'S VISIT:
Deatigre Lima, Oct 25: u.s: pancreas normal in size. Liver is heterogeneous in echogenicity. No definite focal hepatic abnormality. Normal doppler flow. Vessels patent. Gallbladder showed a 6mm stone in the neck of the gallbladder. Common bile duct 3-4 mm and right kidney 12cm. Spleen 14.2cm enlarged. Vessels patent. Overall, heterogeneous liver. 6mm stone seen in the neck of gallbladder. Splenomegaly. Patent vessels. On the prior u.s 4mm stone size was mentioned. Dr Brady

## 2022-10-31 ENCOUNTER — TELEPHONE ENCOUNTER (OUTPATIENT)
Dept: URBAN - METROPOLITAN AREA CLINIC 92 | Facility: CLINIC | Age: 72
End: 2022-10-31

## 2022-10-31 NOTE — HPI-TODAY'S VISIT:
Dear Adamaris Lima, October 2022: [place] labs sent to me. Sodium was 138, potassium 3.9 chloride 105 CO2 23 BUN of 14 creatinine 0.66 calcium 9.2 glucose elevated at 112.  Albumin 4.3 alkaline phosphatase 84 AST 37 ALT 31 with ideal ALT less than 35.  Bilirubin elevated 1.4 but not fractionated.  INR normal at 1.19.  Neutrophils 2.39 lymphocytes 3.13. White blood cell count 6.5 hemoglobin elevated 16.2 hematocrit elevated 50.4 platelet count 60,000.  MCV 85.3 please share with primary provider. February labs showed hemoglobin elevated at 16.4 so very similar to this and now slightly lower.  AST was 38 and ALT 37 last time.  Please share labs with primary provider. Meld 10 and meld na 10 so remaining low. Dr Brady

## 2022-11-01 PROBLEM — 75183008 ABNORMAL LIVER FUNCTION: Status: ACTIVE | Noted: 2022-11-01

## 2022-11-01 PROBLEM — 256896000 BILIARY STONE: Status: ACTIVE | Noted: 2022-11-01

## 2023-01-25 ENCOUNTER — TELEPHONE ENCOUNTER (OUTPATIENT)
Dept: URBAN - METROPOLITAN AREA CLINIC 86 | Facility: CLINIC | Age: 73
End: 2023-01-25

## 2023-02-02 ENCOUNTER — OFFICE VISIT (OUTPATIENT)
Dept: URBAN - METROPOLITAN AREA CLINIC 98 | Facility: CLINIC | Age: 73
End: 2023-02-02

## 2023-02-02 RX ORDER — METOPROLOL TARTRATE 25 MG/1
TAKE 1 TABLET (25 MG) BY ORAL ROUTE ONCE DAILY TABLET, FILM COATED ORAL 1
Qty: 0 | Refills: 0 | Status: ACTIVE | COMMUNITY
Start: 1900-01-01

## 2023-02-02 RX ORDER — SYRINGE-NEEDLE,INSULIN,0.5 ML 30 G X1/2"
USE 3 SYRINGES A DAY TO INJECT INSULIN SYRINGE, EMPTY DISPOSABLE MISCELLANEOUS
Qty: 100 | Refills: 0 | Status: ACTIVE | COMMUNITY

## 2023-02-02 RX ORDER — INSULIN LISPRO 100 [IU]/ML
(BG-100)/40 = #UNITS FOR BLOOG GLUCOSE GREATER THAN 180 SUBCUTANEOUSLY BEFORE BREAKFAST, LUNCH, AND DINNER INJECTION, SOLUTION INTRAVENOUS; SUBCUTANEOUS
Qty: 10 | Refills: 0 | Status: ACTIVE | COMMUNITY

## 2023-02-02 RX ORDER — LOSARTAN POTASSIUM 100 MG/1
TAKE 1 TABLET (100 MG) BY ORAL ROUTE ONCE DAILY TABLET, FILM COATED ORAL 1
Qty: 0 | Refills: 0 | Status: ACTIVE | COMMUNITY
Start: 1900-01-01

## 2023-02-02 RX ORDER — CALCIUM CARBONATE 500(1250)
2 TABLETS TABLET ORAL ONCE A DAY
Status: ACTIVE | COMMUNITY

## 2023-02-02 RX ORDER — ATORVASTATIN CALCIUM 20 MG/1
1 TABLET TABLET, FILM COATED ORAL ONCE A DAY
Status: ACTIVE | COMMUNITY

## 2023-02-02 RX ORDER — DAPAGLIFLOZIN 5 MG/1
1 TABLET TABLET, FILM COATED ORAL ONCE A DAY
Status: ACTIVE | COMMUNITY

## 2023-02-02 NOTE — HPI-TODAY'S VISIT:
Patient is a 72-year-old male who presents to discuss upper endoscopy.  He has a past medical history significant for cirrhosis which is currently managed by Dr. Brady.  Also has a history of CLL.  Upper endoscopy was completed in January 2021 which revealed grade 1 varices.  Was recommended repeat upper endoscopy in July 2022.

## 2023-02-15 ENCOUNTER — LAB OUTSIDE AN ENCOUNTER (OUTPATIENT)
Dept: URBAN - METROPOLITAN AREA CLINIC 98 | Facility: CLINIC | Age: 73
End: 2023-02-15

## 2023-02-15 ENCOUNTER — OFFICE VISIT (OUTPATIENT)
Dept: URBAN - METROPOLITAN AREA CLINIC 98 | Facility: CLINIC | Age: 73
End: 2023-02-15
Payer: COMMERCIAL

## 2023-02-15 VITALS
WEIGHT: 150.8 LBS | HEART RATE: 66 BPM | TEMPERATURE: 97.1 F | HEIGHT: 65 IN | BODY MASS INDEX: 25.12 KG/M2 | DIASTOLIC BLOOD PRESSURE: 64 MMHG | SYSTOLIC BLOOD PRESSURE: 100 MMHG

## 2023-02-15 DIAGNOSIS — K74.60 CIRRHOSIS: ICD-10-CM

## 2023-02-15 DIAGNOSIS — R11.0 NAUSEA: ICD-10-CM

## 2023-02-15 DIAGNOSIS — K76.0 FATTY LIVER: ICD-10-CM

## 2023-02-15 DIAGNOSIS — C91.10 CLL (CHRONIC LYMPHOCYTIC LEUKEMIA): ICD-10-CM

## 2023-02-15 DIAGNOSIS — Z12.11 SCREEN FOR COLON CANCER: ICD-10-CM

## 2023-02-15 DIAGNOSIS — I85.00 ESOPHAGEAL VARICES WITHOUT BLEEDING, UNSPECIFIED ESOPHAGEAL VARICES TYPE: ICD-10-CM

## 2023-02-15 PROCEDURE — 99214 OFFICE O/P EST MOD 30 MIN: CPT

## 2023-02-15 RX ORDER — ATORVASTATIN CALCIUM 20 MG/1
1 TABLET TABLET, FILM COATED ORAL ONCE A DAY
Status: ACTIVE | COMMUNITY

## 2023-02-15 RX ORDER — DAPAGLIFLOZIN 5 MG/1
1 TABLET TABLET, FILM COATED ORAL ONCE A DAY
Status: ACTIVE | COMMUNITY

## 2023-02-15 RX ORDER — LOSARTAN POTASSIUM 100 MG/1
TAKE 1 TABLET (100 MG) BY ORAL ROUTE ONCE DAILY TABLET, FILM COATED ORAL 1
Qty: 0 | Refills: 0 | Status: ACTIVE | COMMUNITY
Start: 1900-01-01

## 2023-02-15 RX ORDER — DAPAGLIFLOZIN 10 MG/1
1 TABLET TABLET, FILM COATED ORAL ONCE A DAY
Status: ACTIVE | COMMUNITY

## 2023-02-15 RX ORDER — CALCIUM CARBONATE 500(1250)
2 TABLETS TABLET ORAL ONCE A DAY
Status: ACTIVE | COMMUNITY

## 2023-02-15 RX ORDER — SODIUM, POTASSIUM,MAG SULFATES 17.5-3.13G
177ML SOLUTION, RECONSTITUTED, ORAL ORAL
Qty: 1 | OUTPATIENT
Start: 2023-02-15 | End: 2023-02-17

## 2023-02-15 RX ORDER — INSULIN LISPRO 100 [IU]/ML
(BG-100)/40 = #UNITS FOR BLOOG GLUCOSE GREATER THAN 180 SUBCUTANEOUSLY BEFORE BREAKFAST, LUNCH, AND DINNER INJECTION, SOLUTION INTRAVENOUS; SUBCUTANEOUS
Qty: 10 | Refills: 0 | Status: ACTIVE | COMMUNITY

## 2023-02-15 RX ORDER — METOPROLOL TARTRATE 25 MG/1
TAKE 1 TABLET (25 MG) BY ORAL ROUTE ONCE DAILY TABLET, FILM COATED ORAL 1
Qty: 0 | Refills: 0 | Status: ACTIVE | COMMUNITY
Start: 1900-01-01

## 2023-02-15 RX ORDER — SUCRALFATE 1 G/1
1 TABLET ON AN EMPTY STOMACH TABLET ORAL TWICE A DAY
Qty: 60 | Refills: 3 | OUTPATIENT
Start: 2023-02-15 | End: 2023-06-15

## 2023-02-15 RX ORDER — SYRINGE-NEEDLE,INSULIN,0.5 ML 30 G X1/2"
USE 3 SYRINGES A DAY TO INJECT INSULIN SYRINGE, EMPTY DISPOSABLE MISCELLANEOUS
Qty: 100 | Refills: 0 | Status: ACTIVE | COMMUNITY

## 2023-02-15 NOTE — HPI-TODAY'S VISIT:
Patient is a 72-year-old male with a history of cirrhosis.He is currently being monitored by Dr. Brady.   Has been recommended that he have EGD in setting of cirrhosis for evaluation of esophageal varices.   Last upper endoscopy completed in 2021 by Dr. Tomlin.   Findings included grade 1 varices in the lower third of the esophagus.  Patchy mildly erythematous mucosa without bleeding in the gastric antrum.   Pathology report revealed chemical reactive gastropathy with no evidence of H. pylori.   Last colonoscopy completed in 2018 with Dr. Tomlin.  Findings included normal-appearing ileum.  Entire examined colon appeared normal.  Diverticulosis in the sigmoid colon and nonbleeding internal hemorrhoids.  With no specimens collected.  Recommended repeating colonoscopy in 5 to 10 years. Patient states he feels nausea mostly in the morning  Bitter taste in the mouth- has been going on for a month  Denies vomiting episodes  Denies unintentional weight loss BMs are described as daily Denies BRBPR or melena

## 2023-02-16 ENCOUNTER — TELEPHONE ENCOUNTER (OUTPATIENT)
Dept: URBAN - METROPOLITAN AREA CLINIC 63 | Facility: CLINIC | Age: 73
End: 2023-02-16

## 2023-02-17 ENCOUNTER — TELEPHONE ENCOUNTER (OUTPATIENT)
Dept: URBAN - METROPOLITAN AREA CLINIC 98 | Facility: CLINIC | Age: 73
End: 2023-02-17

## 2023-02-24 ENCOUNTER — TELEPHONE ENCOUNTER (OUTPATIENT)
Dept: URBAN - METROPOLITAN AREA CLINIC 92 | Facility: CLINIC | Age: 73
End: 2023-02-24

## 2023-02-24 PROBLEM — 14223005: Status: ACTIVE | Noted: 2023-02-02

## 2023-03-06 ENCOUNTER — OFFICE VISIT (OUTPATIENT)
Dept: URBAN - METROPOLITAN AREA SURGERY CENTER 18 | Facility: SURGERY CENTER | Age: 73
End: 2023-03-06

## 2023-03-20 ENCOUNTER — CLAIMS CREATED FROM THE CLAIM WINDOW (OUTPATIENT)
Dept: URBAN - METROPOLITAN AREA MEDICAL CENTER 39 | Facility: MEDICAL CENTER | Age: 73
End: 2023-03-20

## 2023-03-20 ENCOUNTER — CLAIMS CREATED FROM THE CLAIM WINDOW (OUTPATIENT)
Dept: URBAN - METROPOLITAN AREA MEDICAL CENTER 39 | Facility: MEDICAL CENTER | Age: 73
End: 2023-03-20
Payer: COMMERCIAL

## 2023-03-20 DIAGNOSIS — K85.10 ACUTE BILIARY PANCREATITIS: ICD-10-CM

## 2023-03-20 DIAGNOSIS — K74.60 ADVANCED CIRRHOSIS: ICD-10-CM

## 2023-03-20 DIAGNOSIS — Z12.11 AVERAGE RISK FOR CRC. DUE TO PT'S CO-MORBID STATE WITH END STAGE DEMENTIA, HIGH RISK FOR ANESTHESIA (PER NEUROLOGY); INABILITY TO TAKE A BOWEL PREP....WOULD NOT ADVISE ANY COLORECTAL CANCER SCREENING INCLUDING STOOL TEST FOR FECAL BLOOD.: ICD-10-CM

## 2023-03-20 PROCEDURE — 43244 EGD VARICES LIGATION: CPT | Performed by: INTERNAL MEDICINE

## 2023-03-20 PROCEDURE — G0121 COLON CA SCRN NOT HI RSK IND: HCPCS | Performed by: INTERNAL MEDICINE

## 2023-03-20 RX ORDER — INSULIN LISPRO 100 [IU]/ML
(BG-100)/40 = #UNITS FOR BLOOG GLUCOSE GREATER THAN 180 SUBCUTANEOUSLY BEFORE BREAKFAST, LUNCH, AND DINNER INJECTION, SOLUTION INTRAVENOUS; SUBCUTANEOUS
Qty: 10 | Refills: 0 | Status: ACTIVE | COMMUNITY

## 2023-03-20 RX ORDER — SUCRALFATE 1 G/1
1 TABLET ON AN EMPTY STOMACH TABLET ORAL TWICE A DAY
Qty: 60 | Refills: 3 | Status: ACTIVE | COMMUNITY
Start: 2023-02-15 | End: 2023-06-15

## 2023-03-20 RX ORDER — METOPROLOL TARTRATE 25 MG/1
TAKE 1 TABLET (25 MG) BY ORAL ROUTE ONCE DAILY TABLET, FILM COATED ORAL 1
Qty: 0 | Refills: 0 | Status: ACTIVE | COMMUNITY
Start: 1900-01-01

## 2023-03-20 RX ORDER — ATORVASTATIN CALCIUM 20 MG/1
1 TABLET TABLET, FILM COATED ORAL ONCE A DAY
Status: ACTIVE | COMMUNITY

## 2023-03-20 RX ORDER — LOSARTAN POTASSIUM 100 MG/1
TAKE 1 TABLET (100 MG) BY ORAL ROUTE ONCE DAILY TABLET, FILM COATED ORAL 1
Qty: 0 | Refills: 0 | Status: ACTIVE | COMMUNITY
Start: 1900-01-01

## 2023-03-20 RX ORDER — DAPAGLIFLOZIN 10 MG/1
1 TABLET TABLET, FILM COATED ORAL ONCE A DAY
Status: ACTIVE | COMMUNITY

## 2023-03-20 RX ORDER — CALCIUM CARBONATE 500(1250)
2 TABLETS TABLET ORAL ONCE A DAY
Status: ACTIVE | COMMUNITY

## 2023-03-20 RX ORDER — DAPAGLIFLOZIN 5 MG/1
1 TABLET TABLET, FILM COATED ORAL ONCE A DAY
Status: ACTIVE | COMMUNITY

## 2023-03-20 RX ORDER — SYRINGE-NEEDLE,INSULIN,0.5 ML 30 G X1/2"
USE 3 SYRINGES A DAY TO INJECT INSULIN SYRINGE, EMPTY DISPOSABLE MISCELLANEOUS
Qty: 100 | Refills: 0 | Status: ACTIVE | COMMUNITY

## 2023-04-10 ENCOUNTER — LAB OUTSIDE AN ENCOUNTER (OUTPATIENT)
Dept: URBAN - METROPOLITAN AREA CLINIC 86 | Facility: CLINIC | Age: 73
End: 2023-04-10

## 2023-04-17 ENCOUNTER — LAB OUTSIDE AN ENCOUNTER (OUTPATIENT)
Dept: URBAN - METROPOLITAN AREA CLINIC 86 | Facility: CLINIC | Age: 73
End: 2023-04-17

## 2023-04-27 ENCOUNTER — OFFICE VISIT (OUTPATIENT)
Dept: URBAN - METROPOLITAN AREA CLINIC 91 | Facility: CLINIC | Age: 73
End: 2023-04-27

## 2023-04-27 ENCOUNTER — OFFICE VISIT (OUTPATIENT)
Dept: URBAN - METROPOLITAN AREA CLINIC 86 | Facility: CLINIC | Age: 73
End: 2023-04-27

## 2023-04-27 NOTE — HPI-TODAY'S VISIT:
Pt is a 72 year male, after a previous visit on Oct 2022   for an evaluation for elevated liver enzymes and and fatty liver and cirrhosis.   A copy of this document will be sent to the referring provider.      2023 labs done by Dr. Jessica adorno show AFP normal at 2.6.   sodium 137 potassium 3.7 chloride 105 CO2 25 BUN 17 creatinine 0.73 glucose 230 albumin 4.2 alk phos 84 AST 36 ALT 25 bilirubin 1.2 elevated folate 16.9 and ferritin 37 B12 447 iron sat 19% WBC 5.2 hemoglobin 14.7 plate count 57 MCV 83.6 Jan 2023 ct  Narrative & Impression EXAM: CT ANGIO CHEST W IV CONTRAST, CT ANGIO ABDOMEN PELVIS W IV CONTRAST   CLINICAL INDICATION: TAVR/progress trial.   TECHNIQUE: Non-ECG-gated CT images of the chest, abdomen and pelvis were obtained with nonionic intravenous contrast according to CT angiogram protocol.  Advanced offline 3D post-processing was performed utilizing a dedicated 3D workstation.  There were no immediate complications reported. If applicable, point-of-care testing?was approved following departmental protocol.   COMPARISON: 1/28/2019.   FINDINGS:   VASCULAR: 3 leaflet aortic valve however, there is leaflet thickening and calcification and appearances are compatible with at least moderate aortic stenosis. Normal caliber aortic root. No coronary artery anomaly. Moderately severe coronary artery calcification. Normal caliber thoracic aorta with no significant atherosclerotic calcification or atheromatous plaque. No significant tortuosity. Coronary artery bypass grafting with left internal mammary and at least one saphenous vein bypass grafts. 3 vessel aortic arch. The branch vessels are normal caliber patent. Normal caliber abdominal aorta with mild gross atherosclerotic calcification. No significant plaque. No significant tortuosity. Patent celiac axis, SMA, bilateral renal arteries and accessory right renal artery and GERALDO. Normal caliber iliac and common femoral arteries with moderate atherosclerotic calcification. Focal calcified plaque with in the mid right external iliac artery with a luminal diameter minimally measuring 7 mm. Calcified plaque within the left common iliac artery. The luminal diameter minimally measures 6 mm.   CHEST: The lower neck and thoracic inlet are unremarkable.   Heart size is within normal limits. Normal caliber central pulmonary arteries. Small patent foramen ovale. Minor mitral valve annular calcification. No pericardial effusion or thickening.   There are a few subcentimeter lymph nodes. There are borderline enlarged axillary lymph nodes with the largest axillary lymph node on the right measuring 18 mm (in maximum short axis diameter) and on the left 12 mm. Lymph node enlargement is new compared to the prior study. Appearances may represent reactive lymph nodes. Is there a history of lymphoma proliferative disorder such as CLL/SLL?   The central airways are patent. No new or growing nodules calcified old granulomatous disease. No pleural effusion or thickening.   ABDOMEN/PELVIS: No focal abnormality within the liver. No biliary duct dilatation. Tiny calcified calculus within the gallbladder. No cholecystitis. Splenomegaly. The spleen measures 15 cm, 12 cm on the previous study.   The adrenal glands are unremarkable. Normal size kidneys with good cortical thickness and symmetric enhancement.   The pancreas is unremarkable. Minor diverticulosis without diverticulitis. The bowel and mesentery are otherwise on remarkable.   Unremarkable urinary bladder. Normal size prostate.   No free fluid. No lymph node enlargement.   BONES/SOFT TISSUES: Sternotomy wires. Degenerative change. Osteophytes. Screws within the lumbar spine. No aggressive osseous lesion.     IMPRESSION: 3 leaflet aortic valve however, there is leaflet thickening and calcification and appearances are compatible with at least moderate aortic stenosis.    Moderately severe coronary artery calcification. Coronary artery bypass grafting with left internal mammary and at least one saphenous vein bypass grafts.   Normal caliber thoracic aorta with no significant atherosclerotic calcification or atheromatous plaque. No significant tortuosity.   Normal caliber abdominal aorta with mild gross atherosclerotic calcification. No significant plaque. No significant tortuosity.   Normal caliber iliac and common femoral arteries with moderate atherosclerotic calcification. Focal calcified plaque within the mid right external iliac artery with a luminal diameter minimally measuring 7 mm. Calcified plaque within the left common iliac artery. The luminal diameter minimally measures 6 mm.   New bilateral axillary lymph node enlargement is an interval enlargement of the spleen. Appearances are compatible with a lymphoproliferative disorder, specifically CLL/SLL.        Jan 2023 Dr Sesay visit: Hi risk for SAVR due to age, comorbidity, redo Agree with evaluation for Progress moderate AS trial  Lio Sesay MD   March 20 egd                                                                              Findings:      Grade II varices were found in the middle third of the esophagus and in       the lower third of the esophagus. They were medium in size. Six bands       were successfully placed with complete eradication, resulting in       deflation of varices. There was no bleeding during and at the end of the       procedure. Estimated blood loss was minimal.      The stomach was normal.      The examined duodenum was normal.                                                                                 Impression:            - Grade II esophageal varices. Completely eradicated.                         Banded.                        - Normal stomach.                        - Normal examined duodenum.                        - No specimens collected. Recommendation:        - Perform a colonoscopy today.                        - Repeat upper endoscopy in 3 months for retreatment.   March 20 colon: Findings:      The perianal and digital rectal examinations were normal.      The terminal ileum appeared normal.      The colon (entire examined portion) appeared normal.      Non-bleeding internal hemorrhoids were found during retroflexion. The       hemorrhoids were mild.                                                                                 Impression:            - The examined portion of the ileum was normal.                        - The entire examined colon is normal.                        - Non-bleeding internal hemorrhoids.                        - No specimens collected. Recommendation:        - Discharge patient to home (via wheelchair).                        - Repeat colonoscopy in 5 years for surveillance.                        - Return to GI office PRN.  He was last seen on February 15, 2023 by Jyoti Diehl PA-C to talk about EGD surveillance.  Last EGD had been in 2021 and it showed grade 1 varices in patchy mildly erythematous mucosa without bleeding in the gastric antrum.  EGD and colonoscopy were top be set up but apparently they were set up for March 27 the patient called on February 17 to do it sooner elsewhere.  Dear Adamaris Lima, October 2022: [place] labs sent to me. Sodium was 138, potassium 3.9 chloride 105 CO2 23 BUN of 14 creatinine 0.66 calcium 9.2 glucose elevated at 112.  Albumin 4.3 alkaline phosphatase 84 AST 37 ALT 31 with ideal ALT less than 35.  Bilirubin elevated 1.4 but not fractionated.  INR normal at 1.19.  Neutrophils 2.39 lymphocytes 3.13. White blood cell count 6.5 hemoglobin elevated 16.2 hematocrit elevated 50.4 platelet count 60,000.  MCV 85.3 please share with primary provider. February labs showed hemoglobin elevated at 16.4 so very similar to this and now slightly lower.  AST was 38 and ALT 37 last time.  Please share labs with primary provider. Meld 10 and meld na 10 so remaining low. Dr Brady Dear Adamaris Lima, Oct 25: u.s: pancreas normal in size. Liver is heterogeneous in echogenicity. No definite focal hepatic abnormality. Normal doppler flow. Vessels patent. Gallbladder showed a 6mm stone in the neck of the gallbladder. Common bile duct 3-4 mm and right kidney 12cm. Spleen 14.2cm enlarged. Vessels patent. Overall, heterogeneous liver. 6mm stone seen in the neck of gallbladder. Splenomegaly. Patent vessels. On the prior u.s 4mm stone size was mentioned. Dr Brady   Mri: april 11 2022: liver with chronic liver disease and lobar distribution and nodular contour. No hcc. Vessel patent. varices near esoph and stomach and spleen. spleen 14.5cm. Pancreas normal. Enalrged ln seen and mildly inc. Comaptible with CLL.   April 22 ultrasound shows the liver to have a cirrhotic morphology.  It appears to be heterogeneous in echotexture with mild lobulation of the contour. Common bile duct measured 4 mm. There was an echogenic focus in the dependent portion of the gallbladder without shadowing that they felt could be a small stone. No ascites was seen. Pancreas were visualized appeared unremarkable. Right kidney measured 11.5 cm and had normal echogenicity and no hydronephrosis. Liver vessels were patent. Overall they felt that you had a cirrhotic appearing liver with patent vessels and mild splenomegaly.  Gallstones seen but without mention of inflammation. We plan to redo this in 6 months  Hematology is watching him for now with Dr Adorno and he is to see her next month.  feb 11 2022 na 138 and k 4 and cl 104 and co2 27 and rosa 16 and cr 0.82 and alb 4.4 and tb 1.1 and alk 77 and ast 38 and alt 37 and ldh 228. wbc 7.4 hg 16.4 plat 59. No inr.  Reports continued better control with DM since starting Farxiga 10mg    A1c running at approximately still at 6% per pt.  Does not require regular insulin just prn  and off the metformin.    Weight stable and he is weighing 155 and at 148.  HLD controlled with lipitor 20 mg.     He does consume alcohol 1-2  drink per year and was encouraged to stop alcohol entirely if possible.  Received COVID Vaccine x 4 (moderna).    Did egd in jan 2021 and grade 1 varices noted.  Per dr. tomlin note recommend repeat in july 2022 and he has not done this. Dr Tomlin and he has to make it.     US 4/27/21- needs repeat in 6 months (Oct 2021).  Denies abdominal distention, weight gain, confusion, GI bleeding, fevers/chills, abdominal pain.    9/22/21- glucose 118 elevated, creatinine 0.79, tbili 0.7, alk phos 116, ast 47 (prev 53), alt 39 (prev 50)- ideal is less than 35, wbc 8.7, rbc 5.95 (please show primary MD), platelets 63 (prev 86), inr 1.1, sodium 144, MELD 7, MELD-Na 7  4/23/21 tb 1.1 alt 49 alp 84 ast 45  April 27 ultrasound shows the liver to be coarsened with mild nodularity but no discrete liver lesion.  Gallstones are seen but without any gallbladder wall thickening or pericholecystic fluid.  Common bile duct is 2.4 mm.  Pancreas appeared normal to them.  Right kidney appeared to be normal in echogenicity with no renal mass.  Spleen was 12.8 cm.  Liver vessels were patent.  Overall the liver appeared to be coarsened with no focal lesion.  Spleen was upper normal.  I wanted to clarify the size of your right kidney and will await that correction.   They corrected the right kidney size to 11.5cm.  RECAP: he did a recent u/s-cirrhosis noted on imaging. no lesions.  past CT * Final Report *  Reason For Exam Splenomegaly; h/o CLL;Splenomegaly;Cirrhosis of liver  REPORT EXAM: CT Abdomen w/ IV Contrast  CLINICAL INDICATION: Splenomegaly; h/o CLL;Splenomegaly;Cirrhosis of liver.  TECHNIQUE: Following administration of non-ionic IV contrast, postcontrast images through the abdomen were obtained. If applicable, point-of-care testing was approved following departmental protocol. [id number]  COMPARISON: 1/28/2019  FINDINGS:  Lower Thorax: The visualized lung bases are clear. Punctate pleural calcification on the left posteriorly. Coronary artery calcification.  Liver: Hepatic steatosis with a nodular surface contour and fissural widening, unchanged.  Gallbladder/Biliary Tree: Cholelithiasis. No biliary ductal dilatation or gallbladder wall thickening.  Spleen: The spleen measures 13.1 cm in length, previously 11.6 cm at the same level.  Pancreas: Unremarkable.  Adrenal Glands: Unremarkable.   Kidneys/Ureters: Symmetric renal enhancement without obstruction.  Gastrointestinal: No bowel obstruction. Mild, nonspecific wall thickening in the gastric antral pyloric region.   Lymph Nodes: Multiple retroperitoneal lymph nodes are slightly larger. One left para-aortic node (5:33) measures 13 mm short axis, previously 9 mm. One aortocaval node (5:36) measures 9 mm short axis, previously 5 mm. Additional nodes are slightly larger.  Vessels: Moderate atherosclerosis.  Peritoneum/Retroperitoneum: No free fluid.  Bones/Soft Tissues: Osteopenia with degenerative and postsurgical changes in the spine.  IMPRESSION:   1. Slight increase in the size of the spleen. 2. Slight increased size of multiple retroperitoneal lymph nodes. 3. Nonspecific thickening of the gastric antral pylorus wall. 4. Hepatic steatosis with a nodular surface contour and fissural widening consistent with chronic liver disease.    9/15/20 na 141 k 4.6 gluc 109 cr 0.85 tb 1.2 alt 36 ast 39 alp 84  may 2020 labs ast 35 alt 31 tb 0.7 MELD score 8  Plan: 1. We will get the u.s report. 2. We will await the labs and meld. 3. See in 6m with labs and u.s. 4. reminded to do the egd with Dr Tomlin.  Stressed to pt the need for social distancing and strict handwashing and wearing a mask and to follow any other new or added CDC recommendations as this is an evolving target.  Duration of the visit was  minutes with 10 minutes of chart prep and 20 minutes for the face to face today  with time spent reviewing their prior and recent records and labs and discussing their current status and future plans for care for the patient.

## 2023-05-30 ENCOUNTER — OFFICE VISIT (OUTPATIENT)
Dept: URBAN - METROPOLITAN AREA CLINIC 91 | Facility: CLINIC | Age: 73
End: 2023-05-30
Payer: COMMERCIAL

## 2023-05-30 ENCOUNTER — TELEPHONE ENCOUNTER (OUTPATIENT)
Dept: URBAN - METROPOLITAN AREA CLINIC 86 | Facility: CLINIC | Age: 73
End: 2023-05-30

## 2023-05-30 DIAGNOSIS — K76.0 FATTY LIVER: ICD-10-CM

## 2023-05-30 DIAGNOSIS — K74.60 CIRRHOSIS: ICD-10-CM

## 2023-05-30 DIAGNOSIS — R16.1 SPLENOMEGALY: ICD-10-CM

## 2023-05-30 DIAGNOSIS — K80.20 GALLBLADDER CALCULUS WITHOUT CHOLECYSTITIS AND NO OBSTRUCTION: ICD-10-CM

## 2023-05-30 PROCEDURE — 76705 ECHO EXAM OF ABDOMEN: CPT

## 2023-05-30 PROCEDURE — 93975 VASCULAR STUDY: CPT

## 2023-05-30 NOTE — HPI-TODAY'S VISIT:
Blake Lima, May 30 ultrasound shows liver is mildly increased/coarsened in echotexture but with no suspicious lesions. Common bile duct was normal at 3.1 mm. A likely small 6 mm calculus/stone was seen in the gallbladder.  They felt that the gallbladder wall measurement of 4.2 mm was a technical error estimation.  No pericholecystic fluid was seen. Pancreatic tail was not seen due to gas and that the image pancreas was otherwise grossly unremarkable. Right kidney was 12.3 cm with no stones or hydronephrosis.  Spleen was enlarged at 14 cm with no splenic lesions. No ascites was seen. Liver vessels were patent as expected. Splenic vessels were patent. In summary, they felt that you had an increase/coarsened hepatic echotexture in keeping with your history of fatty liver and early chronic parenchymal liver disease.  No suspicious lesions were seen.  Probable 6 mm gallstone seen with no evidence of acute cholecystitis that they could clearly see.  Splenomegaly was seen as well. Of note, on your previous ultrasound last year you had a 6 mm stone also seen in the gallbladder and your spleen measured 14.2 cm then. Dr. Brady

## 2023-05-31 ENCOUNTER — WEB ENCOUNTER (OUTPATIENT)
Dept: URBAN - METROPOLITAN AREA CLINIC 86 | Facility: CLINIC | Age: 73
End: 2023-05-31

## 2023-05-31 ENCOUNTER — OFFICE VISIT (OUTPATIENT)
Dept: URBAN - METROPOLITAN AREA CLINIC 86 | Facility: CLINIC | Age: 73
End: 2023-05-31
Payer: COMMERCIAL

## 2023-05-31 ENCOUNTER — LAB OUTSIDE AN ENCOUNTER (OUTPATIENT)
Dept: URBAN - METROPOLITAN AREA CLINIC 86 | Facility: CLINIC | Age: 73
End: 2023-05-31

## 2023-05-31 VITALS
HEART RATE: 69 BPM | HEIGHT: 65 IN | DIASTOLIC BLOOD PRESSURE: 62 MMHG | WEIGHT: 149 LBS | SYSTOLIC BLOOD PRESSURE: 113 MMHG | TEMPERATURE: 96.5 F | BODY MASS INDEX: 24.83 KG/M2

## 2023-05-31 DIAGNOSIS — E66.9 OBESITY: ICD-10-CM

## 2023-05-31 DIAGNOSIS — I85.10 SECONDARY ESOPHAGEAL VARICES WITHOUT BLEEDING: ICD-10-CM

## 2023-05-31 DIAGNOSIS — Z12.11 SCREEN FOR COLON CANCER: ICD-10-CM

## 2023-05-31 DIAGNOSIS — Z79.899 HIGH RISK MEDICATION USE: ICD-10-CM

## 2023-05-31 DIAGNOSIS — R93.3 ABNORMAL CT SCAN, STOMACH: ICD-10-CM

## 2023-05-31 DIAGNOSIS — Z87.09 HX OF INTRINSIC ASTHMA: ICD-10-CM

## 2023-05-31 DIAGNOSIS — Z71.89 VACCINE COUNSELING: ICD-10-CM

## 2023-05-31 DIAGNOSIS — I10 HYPERTENSION: ICD-10-CM

## 2023-05-31 DIAGNOSIS — I85.00 ESOPHAGEAL VARICES WITHOUT BLEEDING, UNSPECIFIED ESOPHAGEAL VARICES TYPE: ICD-10-CM

## 2023-05-31 DIAGNOSIS — K31.89 INTESTINAL METAPLASIA OF GASTRIC CARDIA: ICD-10-CM

## 2023-05-31 DIAGNOSIS — E55.9 VITAMIN D DEFICIENCY: ICD-10-CM

## 2023-05-31 DIAGNOSIS — E11.9 DIABETES: ICD-10-CM

## 2023-05-31 DIAGNOSIS — K74.60 CIRRHOSIS: ICD-10-CM

## 2023-05-31 DIAGNOSIS — D69.6 THROMBOCYTOPENIA: ICD-10-CM

## 2023-05-31 DIAGNOSIS — R74.8 ABNORMAL LIVER ENZYMES: ICD-10-CM

## 2023-05-31 DIAGNOSIS — M67.40 GANGLION CYST: ICD-10-CM

## 2023-05-31 DIAGNOSIS — C91.10 CLL (CHRONIC LYMPHOCYTIC LEUKEMIA): ICD-10-CM

## 2023-05-31 DIAGNOSIS — R76.8 HEPATITIS B CORE ANTIBODY POSITIVE: ICD-10-CM

## 2023-05-31 DIAGNOSIS — K76.0 FATTY LIVER: ICD-10-CM

## 2023-05-31 DIAGNOSIS — K57.90 DIVERTICULOSIS: ICD-10-CM

## 2023-05-31 DIAGNOSIS — E78.5 HYPERLIPIDEMIA: ICD-10-CM

## 2023-05-31 PROBLEM — 736687002: Status: ACTIVE | Noted: 2023-05-31

## 2023-05-31 PROBLEM — 14223005: Status: ACTIVE | Noted: 2023-05-31

## 2023-05-31 PROCEDURE — 99214 OFFICE O/P EST MOD 30 MIN: CPT

## 2023-05-31 RX ORDER — ASPIRIN 81 MG/1
1 TABLET TABLET, COATED ORAL ONCE A DAY
Status: ACTIVE | COMMUNITY

## 2023-05-31 RX ORDER — CALCIUM CARBONATE 500(1250)
2 TABLETS TABLET ORAL ONCE A DAY
Status: ACTIVE | COMMUNITY

## 2023-05-31 RX ORDER — METOPROLOL TARTRATE 25 MG/1
TAKE 1 TABLET (25 MG) BY ORAL ROUTE ONCE DAILY TABLET, FILM COATED ORAL 1
Qty: 0 | Refills: 0 | Status: ACTIVE | COMMUNITY
Start: 1900-01-01

## 2023-05-31 RX ORDER — DAPAGLIFLOZIN 10 MG/1
1 TABLET TABLET, FILM COATED ORAL ONCE A DAY
Status: ACTIVE | COMMUNITY

## 2023-05-31 RX ORDER — LOSARTAN POTASSIUM 25 MG/1
1 TABLET TABLET ORAL ONCE A DAY
Refills: 0 | Status: ACTIVE | COMMUNITY
Start: 1900-01-01

## 2023-05-31 RX ORDER — INSULIN LISPRO 100 [IU]/ML
(BG-100)/40 = #UNITS FOR BLOOG GLUCOSE GREATER THAN 180 SUBCUTANEOUSLY BEFORE BREAKFAST, LUNCH, AND DINNER INJECTION, SOLUTION INTRAVENOUS; SUBCUTANEOUS
Qty: 10 | Refills: 0 | Status: ACTIVE | COMMUNITY

## 2023-05-31 RX ORDER — SUCRALFATE 1 G/1
1 TABLET ON AN EMPTY STOMACH TABLET ORAL TWICE A DAY
Qty: 60 | Refills: 3 | Status: ON HOLD | COMMUNITY
Start: 2023-02-15 | End: 2023-06-15

## 2023-05-31 RX ORDER — SYRINGE-NEEDLE,INSULIN,0.5 ML 30 G X1/2"
USE 3 SYRINGES A DAY TO INJECT INSULIN SYRINGE, EMPTY DISPOSABLE MISCELLANEOUS
Qty: 100 | Refills: 0 | Status: ACTIVE | COMMUNITY

## 2023-05-31 RX ORDER — ATORVASTATIN CALCIUM 20 MG/1
1 TABLET TABLET, FILM COATED ORAL ONCE A DAY
Status: ACTIVE | COMMUNITY

## 2023-05-31 RX ORDER — DAPAGLIFLOZIN 5 MG/1
1 TABLET TABLET, FILM COATED ORAL ONCE A DAY
Status: DISCONTINUED | COMMUNITY

## 2023-05-31 RX ORDER — CLOPIDOGREL 75 MG/1
1 TABLET TABLET, FILM COATED ORAL ONCE A DAY
Status: ACTIVE | COMMUNITY

## 2023-05-31 NOTE — HPI-TODAY'S VISIT:
Pt is a 72 year male, after a previous visit on Oct 2022   for an evaluation for elevated liver enzymes and and fatty liver and cirrhosis and CLL and hep b core pos.  A copy of this document will be sent to the referring provider.     Not on any tx. Discussed if has any tx to start may need hep b suppressive tx as b core positibe.  May 30 ultrasound shows liver is mildly increased/coarsened in echotexture but with no suspicious lesions. Common bile duct was normal at 3.1 mm. A likely small 6 mm calculus/stone was seen in the gallbladder.  They felt that the gallbladder wall measurement of 4.2 mm was a technical error estimation.  No pericholecystic fluid was seen. Pancreatic tail was not seen due to gas and that the image pancreas was otherwise grossly unremarkable. Right kidney was 12.3 cm with no stones or hydronephrosis.  Spleen was enlarged at 14 cm with no splenic lesions. No ascites was seen. Liver vessels were patent as expected. Splenic vessels were patent. In summary, they felt that you had an increase/coarsened hepatic echotexture in keeping with your history of fatty liver and early chronic parenchymal liver disease.  No suspicious lesions were seen.  Probable 6 mm gallstone seen with no evidence of acute cholecystitis that they could clearly see.  Splenomegaly was seen as well. Of note, on your previous ultrasound last year you had a 6 mm stone also seen in the gallbladder and your spleen measured 14.2 cm then.  Last labs he did from Dr Adorno were 2m ago.  2023 labs done by Dr. Jessica adorno show AFP normal at 2.6.   sodium 137 potassium 3.7 chloride 105 CO2 25 BUN 17 creatinine 0.73 glucose 230 albumin 4.2 alk phos 84 AST 36 ALT 25 bilirubin 1.2 elevated folate 16.9 and ferritin 37 B12 447 iron sat 19% WBC 5.2 hemoglobin 14.7 plate count 57 MCV 83.6  He sees her in 3m and so she will see soon and do the full labs then,  Jan 2023 ct  Narrative & Impression EXAM: CT ANGIO CHEST W IV CONTRAST, CT ANGIO ABDOMEN PELVIS W IV CONTRAST   CLINICAL INDICATION: TAVR/progress trial.   TECHNIQUE: Non-ECG-gated CT images of the chest, abdomen and pelvis were obtained with nonionic intravenous contrast according to CT angiogram protocol.  Advanced offline 3D post-processing was performed utilizing a dedicated 3D workstation.  There were no immediate complications reported. If applicable, point-of-care testing?was approved following departmental protocol.   COMPARISON: 1/28/2019.   FINDINGS:   VASCULAR: 3 leaflet aortic valve however, there is leaflet thickening and calcification and appearances are compatible with at least moderate aortic stenosis. Normal caliber aortic root. No coronary artery anomaly. Moderately severe coronary artery calcification. Normal caliber thoracic aorta with no significant atherosclerotic calcification or atheromatous plaque. No significant tortuosity. Coronary artery bypass grafting with left internal mammary and at least one saphenous vein bypass grafts. 3 vessel aortic arch. The branch vessels are normal caliber patent. Normal caliber abdominal aorta with mild gross atherosclerotic calcification. No significant plaque. No significant tortuosity. Patent celiac axis, SMA, bilateral renal arteries and accessory right renal artery and GERALDO. Normal caliber iliac and common femoral arteries with moderate atherosclerotic calcification. Focal calcified plaque with in the mid right external iliac artery with a luminal diameter minimally measuring 7 mm. Calcified plaque within the left common iliac artery. The luminal diameter minimally measures 6 mm.   CHEST: The lower neck and thoracic inlet are unremarkable.   Heart size is within normal limits. Normal caliber central pulmonary arteries. Small patent foramen ovale. Minor mitral valve annular calcification. No pericardial effusion or thickening.   There are a few subcentimeter lymph nodes. There are borderline enlarged axillary lymph nodes with the largest axillary lymph node on the right measuring 18 mm (in maximum short axis diameter) and on the left 12 mm. Lymph node enlargement is new compared to the prior study. Appearances may represent reactive lymph nodes. Is there a history of lymphoma proliferative disorder such as CLL/SLL?   The central airways are patent. No new or growing nodules calcified old granulomatous disease. No pleural effusion or thickening.   ABDOMEN/PELVIS: No focal abnormality within the liver. No biliary duct dilatation. Tiny calcified calculus within the gallbladder. No cholecystitis. Splenomegaly. The spleen measures 15 cm, 12 cm on the previous study.   The adrenal glands are unremarkable. Normal size kidneys with good cortical thickness and symmetric enhancement.   The pancreas is unremarkable. Minor diverticulosis without diverticulitis. The bowel and mesentery are otherwise on remarkable.   Unremarkable urinary bladder. Normal size prostate.   No free fluid. No lymph node enlargement.   BONES/SOFT TISSUES: Sternotomy wires. Degenerative change. Osteophytes. Screws within the lumbar spine. No aggressive osseous lesion.     IMPRESSION: 3 leaflet aortic valve however, there is leaflet thickening and calcification and appearances are compatible with at least moderate aortic stenosis.    Moderately severe coronary artery calcification. Coronary artery bypass grafting with left internal mammary and at least one saphenous vein bypass grafts.   Normal caliber thoracic aorta with no significant atherosclerotic calcification or atheromatous plaque. No significant tortuosity.   Normal caliber abdominal aorta with mild gross atherosclerotic calcification. No significant plaque. No significant tortuosity.   Normal caliber iliac and common femoral arteries with moderate atherosclerotic calcification. Focal calcified plaque within the mid right external iliac artery with a luminal diameter minimally measuring 7 mm. Calcified plaque within the left common iliac artery. The luminal diameter minimally measures 6 mm.   New bilateral axillary lymph node enlargement is an interval enlargement of the spleen. Appearances are compatible with a lymphoproliferative disorder, specifically CLL/SLL.    Jan 2023 Dr Sesay visit: Hi risk for SAVR due to age, comorbidity, redo Agree with evaluation for Progress moderate AS trial  Lio Sesay MD  He says since then June 13 and maybe doing valve surgery then.  March 20 egd                                                                              Findings:      Grade II varices were found in the middle third of the esophagus and in       the lower third of the esophagus. They were medium in size. Six bands       were successfully placed with complete eradication, resulting in       deflation of varices. There was no bleeding during and at the end of the       procedure. Estimated blood loss was minimal.      The stomach was normal.      The examined duodenum was normal.                                                                                 Impression:            - Grade II esophageal varices. Completely eradicated.                         Banded.                        - Normal stomach.                        - Normal examined duodenum.                        - No specimens collected. Recommendation:        - Perform a colonoscopy today.                        - Repeat upper endoscopy in 3 months for retreatment.   He does the scopes with Dr Nabor radford for the follow up.  March 20 colon: Findings:      The perianal and digital rectal examinations were normal.      The terminal ileum appeared normal.      The colon (entire examined portion) appeared normal.      Non-bleeding internal hemorrhoids were found during retroflexion. The       hemorrhoids were mild.                                                                                 Impression:            - The examined portion of the ileum was normal.                        - The entire examined colon is normal.                        - Non-bleeding internal hemorrhoids.                        - No specimens collected. Recommendation:        - Discharge patient to home (via wheelchair).                        - Repeat colonoscopy in 5 years for surveillance.                        - Return to GI office PRN.  He was last seen on February 15, 2023 by Jyoti Diehl PA-C to talk about EGD surveillance.  Last EGD had been in 2021 and it showed grade 1 varices in patchy mildly erythematous mucosa without bleeding in the gastric antrum.  EGD and colonoscopy were top be set up but apparently they were set up for March 27 the patient called on February 17 to do it sooner elsewhere.  October 2022: [facility] labs sent to me. Sodium was 138, potassium 3.9 chloride 105 CO2 23 BUN of 14 creatinine 0.66 calcium 9.2 glucose elevated at 112.  Albumin 4.3 alkaline phosphatase 84 AST 37 ALT 31 with ideal ALT less than 35.  Bilirubin elevated 1.4 but not fractionated.  INR normal at 1.19.  Neutrophils 2.39 lymphocytes 3.13. White blood cell count 6.5 hemoglobin elevated 16.2 hematocrit elevated 50.4 platelet count 60,000.  MCV 85.3 please share with primary provider. February labs showed hemoglobin elevated at 16.4 so very similar to this and now slightly lower.  AST was 38 and ALT 37 last time.  Please share labs with primary provider. Meld 10 and meld na 10 so remaining low.  Oct 25: u.s: pancreas normal in size. Liver is heterogeneous in echogenicity. No definite focal hepatic abnormality. Normal doppler flow. Vessels patent. Gallbladder showed a 6mm stone in the neck of the gallbladder. Common bile duct 3-4 mm and right kidney 12cm. Spleen 14.2cm enlarged. Vessels patent. Overall, heterogeneous liver. 6mm stone seen in the neck of gallbladder. Splenomegaly. Patent vessels. On the prior u.s 4mm stone size was mentioned.  Mri: april 11 2022: liver with chronic liver disease and lobar distribution and nodular contour. No hcc. Vessel patent. varices near esoph and stomach and spleen. spleen 14.5cm. Pancreas normal. Enalrged ln seen and mildly inc. Comaptible with CLL.   April 22 ultrasound shows the liver to have a cirrhotic morphology.  It appears to be heterogeneous in echotexture with mild lobulation of the contour. Common bile duct measured 4 mm. There was an echogenic focus in the dependent portion of the gallbladder without shadowing that they felt could be a small stone. No ascites was seen. Pancreas were visualized appeared unremarkable. Right kidney measured 11.5 cm and had normal echogenicity and no hydronephrosis. Liver vessels were patent. Overall they felt that you had a cirrhotic appearing liver with patent vessels and mild splenomegaly.  Gallstones seen but without mention of inflammation. We plan to redo this in 6 months  Hematology is watching him for now with Dr Adorno and he is to see her next month.  feb 11 2022 na 138 and k 4 and cl 104 and co2 27 and rosa 16 and cr 0.82 and alb 4.4 and tb 1.1 and alk 77 and ast 38 and alt 37 and ldh 228. wbc 7.4 hg 16.4 plat 59. No inr.  Reports continued better control with DM since starting Farxiga 10mg    A1c running at approximately still at 6-7% per pt and this is unchanged in 2023. Does not require regular insulin just prn  and off the metformin.    Weight stable and he is weighing 147 at home and 152 here and prior 155 and at 148.  HLD controlled with lipitor 20 mg.     He does consume alcohol 1-2  drink per year and was encouraged to stop alcohol entirely if possible.  Received COVID Vaccine x 4 (moderna).    Did egd in jan 2021 and grade 1 varices noted.  Per dr. tomlin note recommend repeat in july 2022 and he has not done this. Dr Tomlin and he has to make it.     US 4/27/21- needs repeat in 6 months (Oct 2021).  Denies abdominal distention, weight gain, confusion, GI bleeding, fevers/chills, abdominal pain.    9/22/21- glucose 118 elevated, creatinine 0.79, tbili 0.7, alk phos 116, ast 47 (prev 53), alt 39 (prev 50)- ideal is less than 35, wbc 8.7, rbc 5.95 (please show primary MD), platelets 63 (prev 86), inr 1.1, sodium 144, MELD 7, MELD-Na 7  4/23/21 tb 1.1 alt 49 alp 84 ast 45  April 27 ultrasound shows the liver to be coarsened with mild nodularity but no discrete liver lesion.  Gallstones are seen but without any gallbladder wall thickening or pericholecystic fluid.  Common bile duct is 2.4 mm.  Pancreas appeared normal to them.  Right kidney appeared to be normal in echogenicity with no renal mass.  Spleen was 12.8 cm.  Liver vessels were patent.  Overall the liver appeared to be coarsened with no focal lesion.  Spleen was upper normal.  I wanted to clarify the size of your right kidney and will await that correction.   They corrected the right kidney size to 11.5cm.  RECAP: he did a recent u/s-cirrhosis noted on imaging. no lesions.  past CT * Final Report *  Reason For Exam Splenomegaly; h/o CLL;Splenomegaly;Cirrhosis of liver  REPORT EXAM: CT Abdomen w/ IV Contrast  CLINICAL INDICATION: Splenomegaly; h/o CLL;Splenomegaly;Cirrhosis of liver.  TECHNIQUE: Following administration of non-ionic IV contrast, postcontrast images through the abdomen were obtained. If applicable, point-of-care testing was approved following departmental protocol. [facility].[id number]  COMPARISON: 1/28/2019  FINDINGS:  Lower Thorax: The visualized lung bases are clear. Punctate pleural calcification on the left posteriorly. Coronary artery calcification.  Liver: Hepatic steatosis with a nodular surface contour and fissural widening, unchanged.  Gallbladder/Biliary Tree: Cholelithiasis. No biliary ductal dilatation or gallbladder wall thickening.  Spleen: The spleen measures 13.1 cm in length, previously 11.6 cm at the same level.  Pancreas: Unremarkable.  Adrenal Glands: Unremarkable.   Kidneys/Ureters: Symmetric renal enhancement without obstruction.  Gastrointestinal: No bowel obstruction. Mild, nonspecific wall thickening in the gastric antral pyloric region.   Lymph Nodes: Multiple retroperitoneal lymph nodes are slightly larger. One left para-aortic node (5:33) measures 13 mm short axis, previously 9 mm. One aortocaval node (5:36) measures 9 mm short axis, previously 5 mm. Additional nodes are slightly larger.  Vessels: Moderate atherosclerosis.  Peritoneum/Retroperitoneum: No free fluid.  Bones/Soft Tissues: Osteopenia with degenerative and postsurgical changes in the spine.  IMPRESSION:   1. Slight increase in the size of the spleen. 2. Slight increased size of multiple retroperitoneal lymph nodes. 3. Nonspecific thickening of the gastric antral pylorus wall. 4. Hepatic steatosis with a nodular surface contour and fissural widening consistent with chronic liver disease.    9/15/20 na 141 k 4.6 gluc 109 cr 0.85 tb 1.2 alt 36 ast 39 alp 84  may 2020 labs ast 35 alt 31 tb 0.7 MELD score 8  Plan: 1. He needs the egd banding surveillance with Dr Tomlin for june and not set up per pt. 2. Pt will do meld labs with Dr Adorno when sees her soon. 3. He is aware of the b core state and need to follow and tx that if to get isp. 4. Pt will see us in 6m.   Stressed to pt the need for social distancing and strict handwashing and wearing a mask and to follow any other new or added CDC recommendations as this is an evolving target.  Duration of the visit was 35 minutes with 10 minutes of chart prep and 25 minutes for the face to face today  with time spent reviewing their prior and recent records and labs and discussing their current status and future plans for care for the patient.

## 2023-06-11 ENCOUNTER — TELEPHONE ENCOUNTER (OUTPATIENT)
Dept: URBAN - METROPOLITAN AREA CLINIC 86 | Facility: CLINIC | Age: 73
End: 2023-06-11

## 2023-06-11 LAB
A/G RATIO: 1.8
ALBUMIN: 4
ALKALINE PHOSPHATASE: 87
ALT (SGPT): 22
AST (SGOT): 33
BASO (ABSOLUTE): 0
BASOS: 1
BILIRUBIN, TOTAL: 0.7
BUN/CREATININE RATIO: 20
BUN: 14
CALCIUM: 8.7
CARBON DIOXIDE, TOTAL: 20
CHLORIDE: 107
CREATININE: 0.71
EGFR: 98
EOS (ABSOLUTE): 0.2
EOS: 4
GLOBULIN, TOTAL: 2.2
GLUCOSE: 84
HEMATOCRIT: 35.5
HEMATOLOGY COMMENTS:: (no result)
HEMOGLOBIN: 11.5
IMMATURE CELLS: (no result)
IMMATURE GRANS (ABS): 0
IMMATURE GRANULOCYTES: 0
INR: 1.2
LYMPHS (ABSOLUTE): 2.9
LYMPHS: 50
MCH: 26.9
MCHC: 32.4
MCV: 83
MONOCYTES(ABSOLUTE): 0.5
MONOCYTES: 9
NEUTROPHILS (ABSOLUTE): 2.1
NEUTROPHILS: 36
NRBC: (no result)
PLATELETS: 66
POTASSIUM: 4.1
PROTEIN, TOTAL: 6.2
PROTHROMBIN TIME: 11.9
RBC: 4.28
RDW: 15.5
SODIUM: 140
WBC: 5.7

## 2023-06-11 NOTE — HPI-TODAY'S VISIT:
Dear Shelia Lima, June 9 labs show glucose of 84 which is normal this time.  Previously had been elevated at 118 on a comparison labs that they gave us from 2 years ago.  Please share with primary provider to be aware of.  BUN of 14 creatinine slightly low at 0.71 sodium 140 potassium 4.1.  Chloride slightly up at 107 and may reflect hydrational state that day.  Calcium 8.7 albumin 4.0 bilirubin 0.7 alk phos 87 with an AST of 33 and ALT of 22 which appeared to be lower than the comparison labs that they gave on the current sample but again from 2 years ago.  Ideal ALT is less than 35. WBC 5.7 hemoglobin was low though at 11.5 hematocrit low at 35.5 with a previous comparison values from 2 years ago being normal.  Platelet count was slightly higher now at 66 from previous comparison level of 63.  MCV normal at 83 and neutrophils 2.1 and lymphocytes 2.9. INR normal at 1.2. From the note, we saw some more recent labs that you did with Dr. Hobbs that it showed that your hemoglobin likewise was higher at 14.7 and it is clearly lower now platelets were 57,000 then.  Liver labs were AST of 36 and ALT of 25 so those are very close to these. The main difference appears to be that your hemoglobin is a little lower and I would asked that you follow-up with Dr. Jessica Hobbs your hematologist regarding this issue. Dr Brady

## 2023-11-13 ENCOUNTER — LAB OUTSIDE AN ENCOUNTER (OUTPATIENT)
Dept: URBAN - METROPOLITAN AREA CLINIC 86 | Facility: CLINIC | Age: 73
End: 2023-11-13

## 2023-11-28 ENCOUNTER — OFFICE VISIT (OUTPATIENT)
Dept: URBAN - METROPOLITAN AREA CLINIC 86 | Facility: CLINIC | Age: 73
End: 2023-11-28

## 2023-11-28 ENCOUNTER — OFFICE VISIT (OUTPATIENT)
Dept: URBAN - METROPOLITAN AREA CLINIC 91 | Facility: CLINIC | Age: 73
End: 2023-11-28

## 2023-12-01 ENCOUNTER — OFFICE VISIT (OUTPATIENT)
Dept: URBAN - METROPOLITAN AREA CLINIC 91 | Facility: CLINIC | Age: 73
End: 2023-12-01

## 2023-12-28 ENCOUNTER — OFFICE VISIT (OUTPATIENT)
Dept: URBAN - METROPOLITAN AREA CLINIC 91 | Facility: CLINIC | Age: 73
End: 2023-12-28
Payer: COMMERCIAL

## 2023-12-28 DIAGNOSIS — K80.20 CHOLELITHIASIS: ICD-10-CM

## 2023-12-28 DIAGNOSIS — K74.60 CIRRHOSIS: ICD-10-CM

## 2023-12-28 PROCEDURE — 76705 ECHO EXAM OF ABDOMEN: CPT

## 2023-12-28 PROCEDURE — 93975 VASCULAR STUDY: CPT

## 2023-12-29 ENCOUNTER — TELEPHONE ENCOUNTER (OUTPATIENT)
Dept: URBAN - METROPOLITAN AREA CLINIC 86 | Facility: CLINIC | Age: 73
End: 2023-12-29

## 2023-12-29 NOTE — HPI-TODAY'S VISIT:
Dear Shelia Lima,  Dec 28: partially visualized pancreas unremarkable. Coarsened echotexture of the liver parenchyma with surface nodularity and compatible with cirrhosis. No focal liver mass.  Mild thickening of the gallbladder wall without pericholecystic fluid and similar to prior study. Some gallstones seen up to 6mm. Common bile duct 4mm and normal. Right kidney 12.4cm and no hydronephrosis. Spleen 14.8cm. Trace perisplenic ascites seen. Need you to stay on low salt diet with this issue. Doppler vessels patent. In summary, cirrhosis and portal hypertension changes seen in the form of splenomegaly and trace ascites. No focal mass.  Gallstones seen without acute cholecystitits and apparent mild gallbaldder wall thickening and no pericholecystic fluid favoring due to chronic liver disease.   Doppler vessels patent but hepatic aretery slightly decreased in resistive index and nonspecific.  Need liver labs to correlate with this.   May want to redo the u.s in 3m and see if noted to be changed. Dr Brady

## 2024-01-03 ENCOUNTER — TELEPHONE ENCOUNTER (OUTPATIENT)
Dept: URBAN - METROPOLITAN AREA CLINIC 91 | Facility: CLINIC | Age: 74
End: 2024-01-03

## 2024-01-03 ENCOUNTER — LAB OUTSIDE AN ENCOUNTER (OUTPATIENT)
Dept: URBAN - METROPOLITAN AREA CLINIC 86 | Facility: CLINIC | Age: 74
End: 2024-01-03

## 2024-01-03 ENCOUNTER — OFFICE VISIT (OUTPATIENT)
Dept: URBAN - METROPOLITAN AREA CLINIC 86 | Facility: CLINIC | Age: 74
End: 2024-01-03
Payer: COMMERCIAL

## 2024-01-03 VITALS
WEIGHT: 148 LBS | DIASTOLIC BLOOD PRESSURE: 54 MMHG | SYSTOLIC BLOOD PRESSURE: 107 MMHG | BODY MASS INDEX: 24.66 KG/M2 | HEIGHT: 65 IN | TEMPERATURE: 97.7 F | HEART RATE: 68 BPM

## 2024-01-03 DIAGNOSIS — K31.89 INTESTINAL METAPLASIA OF GASTRIC CARDIA: ICD-10-CM

## 2024-01-03 DIAGNOSIS — E11.9 DIABETES: ICD-10-CM

## 2024-01-03 DIAGNOSIS — K74.60 CIRRHOSIS: ICD-10-CM

## 2024-01-03 DIAGNOSIS — I85.10 SECONDARY ESOPHAGEAL VARICES WITHOUT BLEEDING: ICD-10-CM

## 2024-01-03 DIAGNOSIS — I85.00 ESOPHAGEAL VARICES WITHOUT BLEEDING, UNSPECIFIED ESOPHAGEAL VARICES TYPE: ICD-10-CM

## 2024-01-03 DIAGNOSIS — Z87.09 HX OF INTRINSIC ASTHMA: ICD-10-CM

## 2024-01-03 DIAGNOSIS — K57.90 DIVERTICULOSIS: ICD-10-CM

## 2024-01-03 DIAGNOSIS — K76.0 FATTY LIVER: ICD-10-CM

## 2024-01-03 DIAGNOSIS — E66.9 OBESITY: ICD-10-CM

## 2024-01-03 DIAGNOSIS — R93.3 ABNORMAL CT SCAN, STOMACH: ICD-10-CM

## 2024-01-03 DIAGNOSIS — C91.10 CLL (CHRONIC LYMPHOCYTIC LEUKEMIA): ICD-10-CM

## 2024-01-03 DIAGNOSIS — Z12.11 SCREEN FOR COLON CANCER: ICD-10-CM

## 2024-01-03 DIAGNOSIS — Z71.89 VACCINE COUNSELING: ICD-10-CM

## 2024-01-03 DIAGNOSIS — R74.8 ABNORMAL LIVER ENZYMES: ICD-10-CM

## 2024-01-03 DIAGNOSIS — E55.9 VITAMIN D DEFICIENCY: ICD-10-CM

## 2024-01-03 DIAGNOSIS — I10 HYPERTENSION: ICD-10-CM

## 2024-01-03 DIAGNOSIS — Z79.899 HIGH RISK MEDICATION USE: ICD-10-CM

## 2024-01-03 DIAGNOSIS — R76.8 HEPATITIS B CORE ANTIBODY POSITIVE: ICD-10-CM

## 2024-01-03 DIAGNOSIS — D69.6 THROMBOCYTOPENIA: ICD-10-CM

## 2024-01-03 DIAGNOSIS — R93.2 ABNORMAL GALLBLADDER ULTRASOUND: ICD-10-CM

## 2024-01-03 DIAGNOSIS — E78.5 HYPERLIPIDEMIA: ICD-10-CM

## 2024-01-03 DIAGNOSIS — M67.40 GANGLION CYST: ICD-10-CM

## 2024-01-03 PROCEDURE — 99215 OFFICE O/P EST HI 40 MIN: CPT

## 2024-01-03 RX ORDER — ATORVASTATIN CALCIUM 20 MG/1
1 TABLET TABLET, FILM COATED ORAL ONCE A DAY
Status: ACTIVE | COMMUNITY

## 2024-01-03 RX ORDER — SYRINGE-NEEDLE,INSULIN,0.5 ML 30 G X1/2"
USE 3 SYRINGES A DAY TO INJECT INSULIN SYRINGE, EMPTY DISPOSABLE MISCELLANEOUS
Qty: 100 | Refills: 0 | Status: ACTIVE | COMMUNITY

## 2024-01-03 RX ORDER — ASPIRIN 81 MG/1
1 TABLET TABLET, COATED ORAL ONCE A DAY
Status: ACTIVE | COMMUNITY

## 2024-01-03 RX ORDER — INSULIN LISPRO 100 [IU]/ML
(BG-100)/40 = #UNITS FOR BLOOG GLUCOSE GREATER THAN 180 SUBCUTANEOUSLY BEFORE BREAKFAST, LUNCH, AND DINNER INJECTION, SOLUTION INTRAVENOUS; SUBCUTANEOUS
Qty: 10 | Refills: 0 | Status: ACTIVE | COMMUNITY

## 2024-01-03 RX ORDER — CALCIUM CARBONATE 500(1250)
2 TABLETS TABLET ORAL ONCE A DAY
Status: ACTIVE | COMMUNITY

## 2024-01-03 RX ORDER — CLOPIDOGREL 75 MG/1
1 TABLET TABLET, FILM COATED ORAL ONCE A DAY
Status: ACTIVE | COMMUNITY

## 2024-01-03 RX ORDER — LOSARTAN POTASSIUM 25 MG/1
1 TABLET TABLET ORAL ONCE A DAY
Refills: 0 | Status: ACTIVE | COMMUNITY
Start: 1900-01-01

## 2024-01-03 RX ORDER — DAPAGLIFLOZIN 10 MG/1
1 TABLET TABLET, FILM COATED ORAL ONCE A DAY
Status: ACTIVE | COMMUNITY

## 2024-01-03 RX ORDER — METOPROLOL TARTRATE 25 MG/1
TAKE 1 TABLET (25 MG) BY ORAL ROUTE ONCE DAILY TABLET, FILM COATED ORAL 1
Qty: 0 | Refills: 0 | Status: ACTIVE | COMMUNITY
Start: 1900-01-01

## 2024-01-03 NOTE — HPI-TODAY'S VISIT:
Pt is a 73 year male, after a previous visit on May 2023  for an evaluation for elevated liver enzymes and and fatty liver and cirrhosis and CLL and hep b core pos.  A copy of this document will be sent to the referring provider.     Not on any tx for the CLL. Discussed if has any tx to start may need hep b suppressive tx as b core positive.  Dec 28: partially visualized pancreas unremarkable. Coarsened echotexture of the liver parenchyma with surface nodularity and compatible with cirrhosis. No focal liver mass. Mild thickening of the gallbladder wall without pericholecystic fluid and similar to prior study. Some gallstones seen up to 6mm. Common bile duct 4mm and normal. Right kidney 12.4cm and no hydronephrosis. Spleen 14.8cm. Trace perisplenic ascites seen. Need you to stay on low salt diet with this issue. Doppler vessels patent. In summary, cirrhosis and portal hypertension changes seen in the form of splenomegaly and trace ascites. No focal mass.  Gallstones seen without acute cholecystitits and apparent mild gallbaldder wall thickening and no pericholecystic fluid favoring due to chronic liver disease.  Doppler vessels patent but hepatic aretery slightly decreased in resistive index and nonspecific.  Need liver labs to correlate with this.  Pt says he is on low salt diet. He lives near [place] so we can do the u.s in [place] and do visit to review that.  He will do labs today.  June 9 labs show glucose of 84 which is normal this time. Previously had been elevated at 118 on a comparison labs that they gave us from 2 years ago. Please share with primary provider to be aware of. BUN of 14 creatinine slightly low at 0.71 sodium 140 potassium 4.1. Chloride slightly up at 107 and may reflect hydrational state that day. Calcium 8.7 albumin 4.0 bilirubin 0.7 alk phos 87 with an AST of 33 and ALT of 22 which appeared to be lower than the comparison labs that they gave on the current sample but again from 2 years ago. Ideal ALT is less than 35. WBC 5.7 hemoglobin was low though at 11.5 hematocrit low at 35.5 with a previous comparison values from 2 years ago being normal. Platelet count was slightly higher now at 66 from previous comparison level of 63. MCV normal at 83 and neutrophils 2.1 and lymphocytes 2.9. INR normal at 1.2. From the note, we saw some more recent labs that you did with Dr. Hobbs that it showed that your hemoglobin likewise was higher at 14.7 and it is clearly lower now platelets were 57,000 then. Liver labs were AST of 36 and ALT of 25 so those are very close to these. The main difference appears to be that your hemoglobin is a little lower and I would asked that you follow-up with Dr. Jessica Hobbs your hematologist regarding this issue.  May 30 2023 ultrasound shows liver is mildly increased/coarsened in echotexture but with no suspicious lesions. Common bile duct was normal at 3.1 mm. A likely small 6 mm calculus/stone was seen in the gallbladder.  They felt that the gallbladder wall measurement of 4.2 mm was a technical error estimation.  No pericholecystic fluid was seen. Pancreatic tail was not seen due to gas and that the image pancreas was otherwise grossly unremarkable. Right kidney was 12.3 cm with no stones or hydronephrosis.  Spleen was enlarged at 14 cm with no splenic lesions. No ascites was seen. Liver vessels were patent as expected. Splenic vessels were patent. In summary, they felt that you had an increase/coarsened hepatic echotexture in keeping with your history of fatty liver and early chronic parenchymal liver disease.  No suspicious lesions were seen.  Probable 6 mm gallstone seen with no evidence of acute cholecystitis that they could clearly see.  Splenomegaly was seen as well. Of note, on your previous ultrasound last year you had a 6 mm stone also seen in the gallbladder and your spleen measured 14.2 cm then.  Last labs he did from Dr Hobbs were 2m ago.  2023 labs done by Dr. Jessica Hobbs show AFP normal at 2.6.   sodium 137 potassium 3.7 chloride 105 CO2 25 BUN 17 creatinine 0.73 glucose 230 albumin 4.2 alk phos 84 AST 36 ALT 25 bilirubin 1.2 elevated folate 16.9 and ferritin 37 B12 447 iron sat 19% WBC 5.2 hemoglobin 14.7 plate count 57 MCV 83.6  He sees her in 3m and so she will see soon and do the full labs then,  Jan 2023 ct  Narrative & Impression EXAM: CT ANGIO CHEST W IV CONTRAST, CT ANGIO ABDOMEN PELVIS W IV CONTRAST   CLINICAL INDICATION: TAVR/progress trial.   TECHNIQUE: Non-ECG-gated CT images of the chest, abdomen and pelvis were obtained with nonionic intravenous contrast according to CT angiogram protocol.  Advanced offline 3D post-processing was performed utilizing a dedicated 3D workstation.  There were no immediate complications reported. If applicable, point-of-care testing?was approved following departmental protocol.   COMPARISON: 1/28/2019.   FINDINGS:   VASCULAR: 3 leaflet aortic valve however, there is leaflet thickening and calcification and appearances are compatible with at least moderate aortic stenosis. Normal caliber aortic root. No coronary artery anomaly. Moderately severe coronary artery calcification. Normal caliber thoracic aorta with no significant atherosclerotic calcification or atheromatous plaque. No significant tortuosity. Coronary artery bypass grafting with left internal mammary and at least one saphenous vein bypass grafts. 3 vessel aortic arch. The branch vessels are normal caliber patent. Normal caliber abdominal aorta with mild gross atherosclerotic calcification. No significant plaque. No significant tortuosity. Patent celiac axis, SMA, bilateral renal arteries and accessory right renal artery and GERALDO. Normal caliber iliac and common femoral arteries with moderate atherosclerotic calcification. Focal calcified plaque with in the mid right external iliac artery with a luminal diameter minimally measuring 7 mm. Calcified plaque within the left common iliac artery. The luminal diameter minimally measures 6 mm.   CHEST: The lower neck and thoracic inlet are unremarkable.   Heart size is within normal limits. Normal caliber central pulmonary arteries. Small patent foramen ovale. Minor mitral valve annular calcification. No pericardial effusion or thickening.   There are a few subcentimeter lymph nodes. There are borderline enlarged axillary lymph nodes with the largest axillary lymph node on the right measuring 18 mm (in maximum short axis diameter) and on the left 12 mm. Lymph node enlargement is new compared to the prior study. Appearances may represent reactive lymph nodes. Is there a history of lymphoma proliferative disorder such as CLL/SLL?   The central airways are patent. No new or growing nodules calcified old granulomatous disease. No pleural effusion or thickening.   ABDOMEN/PELVIS: No focal abnormality within the liver. No biliary duct dilatation. Tiny calcified calculus within the gallbladder. No cholecystitis. Splenomegaly. The spleen measures 15 cm, 12 cm on the previous study.   The adrenal glands are unremarkable. Normal size kidneys with good cortical thickness and symmetric enhancement.   The pancreas is unremarkable. Minor diverticulosis without diverticulitis. The bowel and mesentery are otherwise on remarkable.   Unremarkable urinary bladder. Normal size prostate.   No free fluid. No lymph node enlargement.   BONES/SOFT TISSUES: Sternotomy wires. Degenerative change. Osteophytes. Screws within the lumbar spine. No aggressive osseous lesion.     IMPRESSION: 3 leaflet aortic valve however, there is leaflet thickening and calcification and appearances are compatible with at least moderate aortic stenosis.    Moderately severe coronary artery calcification. Coronary artery bypass grafting with left internal mammary and at least one saphenous vein bypass grafts.   Normal caliber thoracic aorta with no significant atherosclerotic calcification or atheromatous plaque. No significant tortuosity.   Normal caliber abdominal aorta with mild gross atherosclerotic calcification. No significant plaque. No significant tortuosity.   Normal caliber iliac and common femoral arteries with moderate atherosclerotic calcification. Focal calcified plaque within the mid right external iliac artery with a luminal diameter minimally measuring 7 mm. Calcified plaque within the left common iliac artery. The luminal diameter minimally measures 6 mm.   New bilateral axillary lymph node enlargement is an interval enlargement of the spleen. Appearances are compatible with a lymphoproliferative disorder, specifically CLL/SLL.    Jan 2023 Dr Sesay visit: Hi risk for SAVR due to age, comorbidity, redo Agree with evaluation for Progress moderate AS trial  Lio Sesay MD  He says since then June 13 and maybe doing valve surgery then.  March 20 egd                                                                              Findings:      Grade II varices were found in the middle third of the esophagus and in       the lower third of the esophagus. They were medium in size. Six bands       were successfully placed with complete eradication, resulting in       deflation of varices. There was no bleeding during and at the end of the       procedure. Estimated blood loss was minimal.      The stomach was normal.      The examined duodenum was normal.                                                                                 Impression:            - Grade II esophageal varices. Completely eradicated.                         Banded.                        - Normal stomach.                        - Normal examined duodenum.                        - No specimens collected. Recommendation:        - Perform a colonoscopy today.                        - Repeat upper endoscopy in 3 months for retreatment.   He does the scopes with Dr Tomlin to for the follow up.  March 20 colon: Findings:      The perianal and digital rectal examinations were normal.      The terminal ileum appeared normal.      The colon (entire examined portion) appeared normal.      Non-bleeding internal hemorrhoids were found during retroflexion. The       hemorrhoids were mild.                                                                                 Impression:            - The examined portion of the ileum was normal.                        - The entire examined colon is normal.                        - Non-bleeding internal hemorrhoids.                        - No specimens collected. Recommendation:        - Discharge patient to home (via wheelchair).                        - Repeat colonoscopy in 5 years for surveillance.                        - Return to GI office PRN.  He was last seen on February 15, 2023 by Jyoti Diehl PA-C to talk about EGD surveillance.  Last EGD had been in 2021 and it showed grade 1 varices in patchy mildly erythematous mucosa without bleeding in the gastric antrum.  EGD and colonoscopy were top be set up but apparently they were set up for March 27 the patient called on February 17 to do it sooner elsewhere.  October 2022: [place] labs sent to me. Sodium was 138, potassium 3.9 chloride 105 CO2 23 BUN of 14 creatinine 0.66 calcium 9.2 glucose elevated at 112.  Albumin 4.3 alkaline phosphatase 84 AST 37 ALT 31 with ideal ALT less than 35.  Bilirubin elevated 1.4 but not fractionated.  INR normal at 1.19.  Neutrophils 2.39 lymphocytes 3.13. White blood cell count 6.5 hemoglobin elevated 16.2 hematocrit elevated 50.4 platelet count 60,000.  MCV 85.3 please share with primary provider. February labs showed hemoglobin elevated at 16.4 so very similar to this and now slightly lower.  AST was 38 and ALT 37 last time.  Please share labs with primary provider. Meld 10 and meld na 10 so remaining low.  Oct 25: u.s: pancreas normal in size. Liver is heterogeneous in echogenicity. No definite focal hepatic abnormality. Normal doppler flow. Vessels patent. Gallbladder showed a 6mm stone in the neck of the gallbladder. Common bile duct 3-4 mm and right kidney 12cm. Spleen 14.2cm enlarged. Vessels patent. Overall, heterogeneous liver. 6mm stone seen in the neck of gallbladder. Splenomegaly. Patent vessels. On the prior u.s 4mm stone size was mentioned.  Mri: april 11 2022: liver with chronic liver disease and lobar distribution and nodular contour. No hcc. Vessel patent. varices near esoph and stomach and spleen. spleen 14.5cm. Pancreas normal. Enalrged ln seen and mildly inc. Comaptible with CLL.   April 22 ultrasound shows the liver to have a cirrhotic morphology.  It appears to be heterogeneous in echotexture with mild lobulation of the contour. Common bile duct measured 4 mm. There was an echogenic focus in the dependent portion of the gallbladder without shadowing that they felt could be a small stone. No ascites was seen. Pancreas were visualized appeared unremarkable. Right kidney measured 11.5 cm and had normal echogenicity and no hydronephrosis. Liver vessels were patent. Overall they felt that you had a cirrhotic appearing liver with patent vessels and mild splenomegaly.  Gallstones seen but without mention of inflammation. We plan to redo this in 6 months  Hematology is watching him for now with Dr Hobbs and he is to see her next month.  feb 11 2022 na 138 and k 4 and cl 104 and co2 27 and rosa 16 and cr 0.82 and alb 4.4 and tb 1.1 and alk 77 and ast 38 and alt 37 and ldh 228. wbc 7.4 hg 16.4 plat 59. No inr.  Reports continued better control with DM since starting Farxiga 10mg    A1c running at approximately still at 6-7% per pt and this is unchanged in 2023. Does not require regular insulin just prn  and off the metformin.    Weight stable and he is weighing 147 at home and 152 here and prior 155 and at 148.  HLD controlled with lipitor 20 mg.     He does consume alcohol 1-2  drink per year and was encouraged to stop alcohol entirely if possible.  Received COVID Vaccine x 4 (moderna).    Did egd in jan 2021 and grade 1 varices noted.  Per dr. tomlin note recommend repeat in july 2022 and he has not done this. Dr Tomlin and he has to make it.     US 4/27/21- needs repeat in 6 months (Oct 2021).  Denies abdominal distention, weight gain, confusion, GI bleeding, fevers/chills, abdominal pain.    9/22/21- glucose 118 elevated, creatinine 0.79, tbili 0.7, alk phos 116, ast 47 (prev 53), alt 39 (prev 50)- ideal is less than 35, wbc 8.7, rbc 5.95 (please show primary MD), platelets 63 (prev 86), inr 1.1, sodium 144, MELD 7, MELD-Na 7  4/23/21 tb 1.1 alt 49 alp 84 ast 45  April 27 ultrasound shows the liver to be coarsened with mild nodularity but no discrete liver lesion.  Gallstones are seen but without any gallbladder wall thickening or pericholecystic fluid.  Common bile duct is 2.4 mm.  Pancreas appeared normal to them.  Right kidney appeared to be normal in echogenicity with no renal mass.  Spleen was 12.8 cm.  Liver vessels were patent.  Overall the liver appeared to be coarsened with no focal lesion.  Spleen was upper normal.  I wanted to clarify the size of your right kidney and will await that correction.   They corrected the right kidney size to 11.5cm.  RECAP: he did a recent u/s-cirrhosis noted on imaging. no lesions.  past CT * Final Report *  Reason For Exam Splenomegaly; h/o CLL;Splenomegaly;Cirrhosis of liver  REPORT EXAM: CT Abdomen w/ IV Contrast  CLINICAL INDICATION: Splenomegaly; h/o CLL;Splenomegaly;Cirrhosis of liver.  TECHNIQUE: Following administration of non-ionic IV contrast, postcontrast images through the abdomen were obtained. If applicable, point-of-care testing was approved following departmental protocol. [facility].[id number]  COMPARISON: 1/28/2019  FINDINGS:  Lower Thorax: The visualized lung bases are clear. Punctate pleural calcification on the left posteriorly. Coronary artery calcification.  Liver: Hepatic steatosis with a nodular surface contour and fissural widening, unchanged.  Gallbladder/Biliary Tree: Cholelithiasis. No biliary ductal dilatation or gallbladder wall thickening.  Spleen: The spleen measures 13.1 cm in length, previously 11.6 cm at the same level.  Pancreas: Unremarkable.  Adrenal Glands: Unremarkable.   Kidneys/Ureters: Symmetric renal enhancement without obstruction.  Gastrointestinal: No bowel obstruction. Mild, nonspecific wall thickening in the gastric antral pyloric region.   Lymph Nodes: Multiple retroperitoneal lymph nodes are slightly larger. One left para-aortic node (5:33) measures 13 mm short axis, previously 9 mm. One aortocaval node (5:36) measures 9 mm short axis, previously 5 mm. Additional nodes are slightly larger.  Vessels: Moderate atherosclerosis.  Peritoneum/Retroperitoneum: No free fluid.  Bones/Soft Tissues: Osteopenia with degenerative and postsurgical changes in the spine.  IMPRESSION:   1. Slight increase in the size of the spleen. 2. Slight increased size of multiple retroperitoneal lymph nodes. 3. Nonspecific thickening of the gastric antral pylorus wall. 4. Hepatic steatosis with a nodular surface contour and fissural widening consistent with chronic liver disease.    9/15/20 na 141 k 4.6 gluc 109 cr 0.85 tb 1.2 alt 36 ast 39 alp 84  may 2020 labs ast 35 alt 31 tb 0.7 MELD score 8  Plan: 1. He needs to see Dr Tomlin for the egd follow up that was to do in June 2023 and not done post the 6 bands march 2023. 2. Pt will do new labs for us and he will follow CLL with Dr Hobbs and sees her soon. 3. He is aware of the b core state and need to follow and start anti HBV tx for that if to get isp. 4. Pt will see us in 3m.   Duration of the visit was 45 minutes with 10 minutes of chart prep and 35 minutes for the face to face today  with time spent reviewing their prior and recent records and labs and discussing their current status and future plans for care for the patient.

## 2024-01-04 ENCOUNTER — TELEPHONE ENCOUNTER (OUTPATIENT)
Dept: URBAN - METROPOLITAN AREA CLINIC 86 | Facility: CLINIC | Age: 74
End: 2024-01-04

## 2024-01-04 LAB
A/G RATIO: 2
ALBUMIN: 4.5
ALKALINE PHOSPHATASE: 96
ALT (SGPT): 34
AST (SGOT): 52
BASO (ABSOLUTE): 0.1
BASOS: 1
BILIRUBIN, TOTAL: 0.9
BUN/CREATININE RATIO: 17
BUN: 13
CALCIUM: 9.3
CARBON DIOXIDE, TOTAL: 22
CHLORIDE: 103
CREATININE: 0.78
EGFR: 95
EOS (ABSOLUTE): 0.4
EOS: 5
GLOBULIN, TOTAL: 2.3
GLUCOSE: 126
HEMATOCRIT: 41.9
HEMATOLOGY COMMENTS:: (no result)
HEMOGLOBIN: 12.7
IMMATURE CELLS: (no result)
IMMATURE GRANS (ABS): 0
IMMATURE GRANULOCYTES: 0
INR: 1.2
LYMPHS (ABSOLUTE): 4.4
LYMPHS: 60
MCH: 23.8
MCHC: 30.3
MCV: 79
MONOCYTES(ABSOLUTE): 0.6
MONOCYTES: 9
NEUTROPHILS (ABSOLUTE): 1.9
NEUTROPHILS: 25
NRBC: (no result)
PLATELETS: 58
POTASSIUM: 4.1
PROTEIN, TOTAL: 6.8
PROTHROMBIN TIME: 12.4
RBC: 5.34
RDW: 15.7
SODIUM: 141
WBC: 7.3

## 2024-01-04 NOTE — HPI-TODAY'S VISIT:
Dear Shelia Lima,  Doug 3 labs show sugar up at 126 and possibly not fasting this day?  BUN of 13 and creatinine 0.78 both normal. Sodium 141 potassium 4.1 albumin 4.5 bilirubin 0.9 alk phos 96 with AST up a little at 52 and ALT of 34 and previously AST 33 and ALT 22. Sometimes patients with fatty liver near the holidays go up a little underweight or become less active and that can usually cause this to worsen in the winter months. WBC 7.3 hemoglobin a little low but higher at 12.7 from 11.5 previously.  MCV was low normal at 79.  The trend is getting a little lower which makes me worry if you are getting close to being iron deficient?  Platelet count was low at 58 from previous 66. Neutrophils normal at 1.9 but lymphocytes up at 4.4.  Previously they have been 2.9 and prior to that 3.1. INR 1.2 which is normal range still. Meld low at 8 and meld na 8. Please share with local providers.Dr Brady

## 2024-04-01 ENCOUNTER — LAB (OUTPATIENT)
Dept: URBAN - METROPOLITAN AREA CLINIC 86 | Facility: CLINIC | Age: 74
End: 2024-04-01

## 2024-04-09 ENCOUNTER — US W/ DOPP (OUTPATIENT)
Dept: URBAN - METROPOLITAN AREA CLINIC 97 | Facility: CLINIC | Age: 74
End: 2024-04-09
Payer: COMMERCIAL

## 2024-04-09 DIAGNOSIS — R16.1 SPLENOMEGALY: ICD-10-CM

## 2024-04-09 DIAGNOSIS — R93.2 ABNORMAL ULTRASOUND OF LIVER: ICD-10-CM

## 2024-04-09 DIAGNOSIS — K80.20 GALLSTONE: ICD-10-CM

## 2024-04-09 PROCEDURE — 76705 ECHO EXAM OF ABDOMEN: CPT

## 2024-04-09 PROCEDURE — 93975 VASCULAR STUDY: CPT

## 2024-04-16 ENCOUNTER — OV EP (OUTPATIENT)
Dept: URBAN - METROPOLITAN AREA CLINIC 86 | Facility: CLINIC | Age: 74
End: 2024-04-16
Payer: COMMERCIAL

## 2024-04-16 VITALS
WEIGHT: 151 LBS | HEART RATE: 69 BPM | SYSTOLIC BLOOD PRESSURE: 116 MMHG | HEIGHT: 65 IN | TEMPERATURE: 97 F | DIASTOLIC BLOOD PRESSURE: 55 MMHG | BODY MASS INDEX: 25.16 KG/M2

## 2024-04-16 DIAGNOSIS — R93.3 ABNORMAL CT SCAN, STOMACH: ICD-10-CM

## 2024-04-16 DIAGNOSIS — C91.10 CLL (CHRONIC LYMPHOCYTIC LEUKEMIA): ICD-10-CM

## 2024-04-16 DIAGNOSIS — K76.0 FATTY LIVER: ICD-10-CM

## 2024-04-16 DIAGNOSIS — K57.90 DIVERTICULOSIS: ICD-10-CM

## 2024-04-16 DIAGNOSIS — E11.9 DIABETES: ICD-10-CM

## 2024-04-16 DIAGNOSIS — E66.9 OBESITY: ICD-10-CM

## 2024-04-16 DIAGNOSIS — E78.5 HYPERLIPIDEMIA: ICD-10-CM

## 2024-04-16 DIAGNOSIS — Z71.89 VACCINE COUNSELING: ICD-10-CM

## 2024-04-16 DIAGNOSIS — Z87.09 HX OF INTRINSIC ASTHMA: ICD-10-CM

## 2024-04-16 DIAGNOSIS — K31.89 INTESTINAL METAPLASIA OF GASTRIC CARDIA: ICD-10-CM

## 2024-04-16 DIAGNOSIS — I10 HYPERTENSION: ICD-10-CM

## 2024-04-16 DIAGNOSIS — E55.9 VITAMIN D DEFICIENCY: ICD-10-CM

## 2024-04-16 DIAGNOSIS — Z79.899 HIGH RISK MEDICATION USE: ICD-10-CM

## 2024-04-16 DIAGNOSIS — R76.8 HEPATITIS B CORE ANTIBODY POSITIVE: ICD-10-CM

## 2024-04-16 DIAGNOSIS — R74.8 ABNORMAL LIVER ENZYMES: ICD-10-CM

## 2024-04-16 DIAGNOSIS — R93.2 ABNORMAL GALLBLADDER ULTRASOUND: ICD-10-CM

## 2024-04-16 DIAGNOSIS — D69.6 THROMBOCYTOPENIA: ICD-10-CM

## 2024-04-16 DIAGNOSIS — M67.40 GANGLION CYST: ICD-10-CM

## 2024-04-16 DIAGNOSIS — I85.10 SECONDARY ESOPHAGEAL VARICES WITHOUT BLEEDING: ICD-10-CM

## 2024-04-16 DIAGNOSIS — Z12.11 SCREEN FOR COLON CANCER: ICD-10-CM

## 2024-04-16 DIAGNOSIS — K74.60 CIRRHOSIS: ICD-10-CM

## 2024-04-16 DIAGNOSIS — I85.00 ESOPHAGEAL VARICES WITHOUT BLEEDING, UNSPECIFIED ESOPHAGEAL VARICES TYPE: ICD-10-CM

## 2024-04-16 PROCEDURE — 99214 OFFICE O/P EST MOD 30 MIN: CPT

## 2024-04-16 RX ORDER — DAPAGLIFLOZIN 10 MG/1
1 TABLET TABLET, FILM COATED ORAL ONCE A DAY
Status: ACTIVE | COMMUNITY

## 2024-04-16 RX ORDER — CALCIUM CARBONATE 500(1250)
2 TABLETS TABLET ORAL ONCE A DAY
Status: ACTIVE | COMMUNITY

## 2024-04-16 RX ORDER — LOSARTAN POTASSIUM 25 MG/1
1 TABLET TABLET ORAL ONCE A DAY
Refills: 0 | Status: ACTIVE | COMMUNITY
Start: 1900-01-01

## 2024-04-16 RX ORDER — METOPROLOL TARTRATE 25 MG/1
TAKE 1 TABLET (25 MG) BY ORAL ROUTE ONCE DAILY TABLET, FILM COATED ORAL 1
Qty: 0 | Refills: 0 | Status: ACTIVE | COMMUNITY
Start: 1900-01-01

## 2024-04-16 RX ORDER — INSULIN LISPRO 100 [IU]/ML
(BG-100)/40 = #UNITS FOR BLOOG GLUCOSE GREATER THAN 180 SUBCUTANEOUSLY BEFORE BREAKFAST, LUNCH, AND DINNER INJECTION, SOLUTION INTRAVENOUS; SUBCUTANEOUS
Qty: 10 | Refills: 0 | Status: ACTIVE | COMMUNITY

## 2024-04-16 RX ORDER — SYRINGE-NEEDLE,INSULIN,0.5 ML 30 G X1/2"
USE 3 SYRINGES A DAY TO INJECT INSULIN SYRINGE, EMPTY DISPOSABLE MISCELLANEOUS
Qty: 100 | Refills: 0 | Status: ACTIVE | COMMUNITY

## 2024-04-16 RX ORDER — CLOPIDOGREL 75 MG/1
1 TABLET TABLET, FILM COATED ORAL ONCE A DAY
Status: DISCONTINUED | COMMUNITY

## 2024-04-16 RX ORDER — ASPIRIN 81 MG/1
1 TABLET TABLET, COATED ORAL ONCE A DAY
Status: ACTIVE | COMMUNITY

## 2024-04-16 RX ORDER — ATORVASTATIN CALCIUM 20 MG/1
1 TABLET TABLET, FILM COATED ORAL ONCE A DAY
Status: ACTIVE | COMMUNITY

## 2024-04-16 RX ORDER — ATORVASTATIN CALCIUM 20 MG/1
1 TABLET TABLET, FILM COATED ORAL ONCE A DAY
Status: DISCONTINUED | COMMUNITY

## 2024-04-16 NOTE — HPI-TODAY'S VISIT:
Pt is a 73 year male, after a previous visit on Jan 2024 for an evaluation for elevated liver enzymes and and fatty liver and cirrhosis and CLL and hep b core pos.  A copy of this document will be sent to the referring provider.     Not on any tx yet for the CLL. Discussed if has any tx to start may need hep b suppressive tx as b core positive  April 12 labs showed glucose elevated at 137 and previously was elevated at 126. Please share with primary provider. He was fasting and he will look at with primary. Bun of 18 creatinine 0.84 sodium 140 potassium 4.9 calcium 9.0 albumin 4.3 bilirubin 0.8 alk phos 120 with AST 62 up from 52 last time and ALT 29. Unfortunately since June of last year your AST went from 33-52-62. Are you on a new medicine? Have you been ill recently? No new reason. WBC 7.3 which is stable for you hemoglobin a little lower at 11.6 from 12.7 in January. MCV normal at 79 but still just at the lower limit of normal. Platelets 56,000 and previously 58,000. Neutrophils normal at 1.9 the lymphocytes a little up at 4.1. INR normal at 1.1.  April 9 ultrasound shows pancreas not well-seen due to gas and body habitus and very limited assessment unremarkable. The liver continues to be coarsened echotexture with no lesions. Liver vessels were patent as expected. Mild gallbladder wall thickening seen at 6 mm but no pericholecystic fluid and an echogenic gallstone was seen.  They suspect that this could be related due to chronic liver disease and less likely due to gallbladder inflammation.  They recommended for us to correlate with clinical history such as if you have symptoms or not of any pain. Common bile duct was normal at 4 mm. Right kidney 12.4 cm with no hydronephrosis. Spleen is mildly enlarged at 14.7 cm and you have trace ascites in the perisplenic space.  Very important that you maintain a low-salt diet with this history. Splenic vessels were patent as expected. In summary, the liver is coarsened echotexture but with no lesions and consistent with chronic liver disease/cirrhosis etiology.  Nonspecific gallbladder wall thickening that could be related due to chronic liver disease or less likely due to gallbladder issues.  Please let us know if you having any symptoms.   Doug 3 labs show sugar up at 126 and possibly not fasting this day? BUN of 13 and creatinine 0.78 both normal. Sodium 141 potassium 4.1 albumin 4.5 bilirubin 0.9 alk phos 96 with AST up a little at 52 and ALT of 34 and previously AST 33 and ALT 22. Sometimes patients with fatty liver near the holidays go up a little underweight or become less active and that can usually cause this to worsen in the winter months. WBC 7.3 hemoglobin a little low but higher at 12.7 from 11.5 previously. MCV was low normal at 79. The trend is getting a little lower which makes me worry if you are getting close to being iron deficient? Platelet count was low at 58 from previous 66. Neutrophils normal at 1.9 but lymphocytes up at 4.4. Previously they have been 2.9 and prior to that 3.1. INR 1.2 which is normal range still. Meld low at 8 and meld na 8.   Dec 28 2023 : partially visualized pancreas unremarkable. Coarsened echotexture of the liver parenchyma with surface nodularity and compatible with cirrhosis. No focal liver mass. Mild thickening of the gallbladder wall without pericholecystic fluid and similar to prior study. Some gallstones seen up to 6mm. Common bile duct 4mm and normal. Right kidney 12.4cm and no hydronephrosis. Spleen 14.8cm. Trace perisplenic ascites seen. Need you to stay on low salt diet with this issue. Doppler vessels patent. In summary, cirrhosis and portal hypertension changes seen in the form of splenomegaly and trace ascites. No focal mass.  Gallstones seen without acute cholecystitits and apparent mild gallbaldder wall thickening and no pericholecystic fluid favoring due to chronic liver disease.  Doppler vessels patent but hepatic aretery slightly decreased in resistive index and nonspecific.  Need liver labs to correlate with this.  Pt says he is on low salt diet. He lives near [place] so we can do the u.s in 3m and do visit to review that.  June 9 labs show glucose of 84 which is normal this time. Previously had been elevated at 118 on a comparison labs that they gave us from 2 years ago. Please share with primary provider to be aware of. BUN of 14 creatinine slightly low at 0.71 sodium 140 potassium 4.1. Chloride slightly up at 107 and may reflect hydrational state that day. Calcium 8.7 albumin 4.0 bilirubin 0.7 alk phos 87 with an AST of 33 and ALT of 22 which appeared to be lower than the comparison labs that they gave on the current sample but again from 2 years ago. Ideal ALT is less than 35. WBC 5.7 hemoglobin was low though at 11.5 hematocrit low at 35.5 with a previous comparison values from 2 years ago being normal. Platelet count was slightly higher now at 66 from previous comparison level of 63. MCV normal at 83 and neutrophils 2.1 and lymphocytes 2.9. INR normal at 1.2. From the note, we saw some more recent labs that you did with Dr. Hobbs that it showed that your hemoglobin likewise was higher at 14.7 and it is clearly lower now platelets were 57,000 then. Liver labs were AST of 36 and ALT of 25 so those are very close to these.  The main difference appears to be that your hemoglobin is a little lower and I would asked that you follow-up with Dr. Jessica Hobbs your hematologist regarding this issue.  Dr Hobbs to see May and told all is ok.  May 30 2023 ultrasound shows liver is mildly increased/coarsened in echotexture but with no suspicious lesions. Common bile duct was normal at 3.1 mm. A likely small 6 mm calculus/stone was seen in the gallbladder.  They felt that the gallbladder wall measurement of 4.2 mm was a technical error estimation.  No pericholecystic fluid was seen. Pancreatic tail was not seen due to gas and that the image pancreas was otherwise grossly unremarkable. Right kidney was 12.3 cm with no stones or hydronephrosis.  Spleen was enlarged at 14 cm with no splenic lesions. No ascites was seen. Liver vessels were patent as expected. Splenic vessels were patent. In summary, they felt that you had an increase/coarsened hepatic echotexture in keeping with your history of fatty liver and early chronic parenchymal liver disease.  No suspicious lesions were seen.  Probable 6 mm gallstone seen with no evidence of acute cholecystitis that they could clearly see.  Splenomegaly was seen as well. Of note, on your previous ultrasound last year you had a 6 mm stone also seen in the gallbladder and your spleen measured 14.2 cm then.  Last labs he did from Dr Hobbs were 2m ago.  2023 labs done by Dr. Jessica Hobbs show AFP normal at 2.6.   sodium 137 potassium 3.7 chloride 105 CO2 25 BUN 17 creatinine 0.73 glucose 230 albumin 4.2 alk phos 84 AST 36 ALT 25 bilirubin 1.2 elevated folate 16.9 and ferritin 37 B12 447 iron sat 19% WBC 5.2 hemoglobin 14.7 plate count 57 MCV 83.6  He sees her in 3m and so she will see soon and do the full labs then,  Jan 2023 ct  Narrative & Impression EXAM: CT ANGIO CHEST W IV CONTRAST, CT ANGIO ABDOMEN PELVIS W IV CONTRAST   CLINICAL INDICATION: TAVR/progress trial.   TECHNIQUE: Non-ECG-gated CT images of the chest, abdomen and pelvis were obtained with nonionic intravenous contrast according to CT angiogram protocol.  Advanced offline 3D post-processing was performed utilizing a dedicated 3D workstation.  There were no immediate complications reported. If applicable, point-of-care testing?was approved following departmental protocol.   COMPARISON: 1/28/2019.   FINDINGS:   VASCULAR: 3 leaflet aortic valve however, there is leaflet thickening and calcification and appearances are compatible with at least moderate aortic stenosis. Normal caliber aortic root. No coronary artery anomaly. Moderately severe coronary artery calcification. Normal caliber thoracic aorta with no significant atherosclerotic calcification or atheromatous plaque. No significant tortuosity. Coronary artery bypass grafting with left internal mammary and at least one saphenous vein bypass grafts. 3 vessel aortic arch. The branch vessels are normal caliber patent. Normal caliber abdominal aorta with mild gross atherosclerotic calcification. No significant plaque. No significant tortuosity. Patent celiac axis, SMA, bilateral renal arteries and accessory right renal artery and GERALDO. Normal caliber iliac and common femoral arteries with moderate atherosclerotic calcification. Focal calcified plaque with in the mid right external iliac artery with a luminal diameter minimally measuring 7 mm. Calcified plaque within the left common iliac artery. The luminal diameter minimally measures 6 mm.   CHEST: The lower neck and thoracic inlet are unremarkable.   Heart size is within normal limits. Normal caliber central pulmonary arteries. Small patent foramen ovale. Minor mitral valve annular calcification. No pericardial effusion or thickening.   There are a few subcentimeter lymph nodes. There are borderline enlarged axillary lymph nodes with the largest axillary lymph node on the right measuring 18 mm (in maximum short axis diameter) and on the left 12 mm. Lymph node enlargement is new compared to the prior study. Appearances may represent reactive lymph nodes. Is there a history of lymphoma proliferative disorder such as CLL/SLL?   The central airways are patent. No new or growing nodules calcified old granulomatous disease. No pleural effusion or thickening.   ABDOMEN/PELVIS: No focal abnormality within the liver. No biliary duct dilatation. Tiny calcified calculus within the gallbladder. No cholecystitis. Splenomegaly. The spleen measures 15 cm, 12 cm on the previous study.   The adrenal glands are unremarkable. Normal size kidneys with good cortical thickness and symmetric enhancement.   The pancreas is unremarkable. Minor diverticulosis without diverticulitis. The bowel and mesentery are otherwise on remarkable.   Unremarkable urinary bladder. Normal size prostate.   No free fluid. No lymph node enlargement.   BONES/SOFT TISSUES: Sternotomy wires. Degenerative change. Osteophytes. Screws within the lumbar spine. No aggressive osseous lesion.     IMPRESSION: 3 leaflet aortic valve however, there is leaflet thickening and calcification and appearances are compatible with at least moderate aortic stenosis.    Moderately severe coronary artery calcification. Coronary artery bypass grafting with left internal mammary and at least one saphenous vein bypass grafts.   Normal caliber thoracic aorta with no significant atherosclerotic calcification or atheromatous plaque. No significant tortuosity.   Normal caliber abdominal aorta with mild gross atherosclerotic calcification. No significant plaque. No significant tortuosity.   Normal caliber iliac and common femoral arteries with moderate atherosclerotic calcification. Focal calcified plaque within the mid right external iliac artery with a luminal diameter minimally measuring 7 mm. Calcified plaque within the left common iliac artery. The luminal diameter minimally measures 6 mm.   New bilateral axillary lymph node enlargement is an interval enlargement of the spleen. Appearances are compatible with a lymphoproliferative disorder, specifically CLL/SLL.    Jan 2023 Dr Sesay visit: Hi risk for SAVR due to age, comorbidity, redo Agree with evaluation for Progress moderate AS trial  Lio Sesay MD  He says since then June 13 and maybe doing valve surgery then.  March 20 egd                                                                              Findings:      Grade II varices were found in the middle third of the esophagus and in       the lower third of the esophagus. They were medium in size. Six bands       were successfully placed with complete eradication, resulting in       deflation of varices. There was no bleeding during and at the end of the       procedure. Estimated blood loss was minimal.      The stomach was normal.      The examined duodenum was normal.                                                                                 Impression:            - Grade II esophageal varices. Completely eradicated.                         Banded.                        - Normal stomach.                        - Normal examined duodenum.                        - No specimens collected. Recommendation:        - Perform a colonoscopy today.                        - Repeat upper endoscopy in 3 months for retreatment.   He does the scopes with Dr Tomlin to for the follow up.  March 20 colon: Findings:      The perianal and digital rectal examinations were normal.      The terminal ileum appeared normal.      The colon (entire examined portion) appeared normal.      Non-bleeding internal hemorrhoids were found during retroflexion. The       hemorrhoids were mild.                                                                                 Impression:            - The examined portion of the ileum was normal.                        - The entire examined colon is normal.                        - Non-bleeding internal hemorrhoids.                        - No specimens collected. Recommendation:        - Discharge patient to home (via wheelchair).                        - Repeat colonoscopy in 5 years for surveillance.                        - Return to GI office PRN.  He was last seen on February 15, 2023 by Jyoti Diehl PA-C to talk about EGD surveillance.  Last EGD had been in 2021 and it showed grade 1 varices in patchy mildly erythematous mucosa without bleeding in the gastric antrum.  EGD and colonoscopy were top be set up but apparently they were set up for March 27 the patient called on February 17 to do it sooner elsewhere.  October 2022: [place] labs sent to me. Sodium was 138, potassium 3.9 chloride 105 CO2 23 BUN of 14 creatinine 0.66 calcium 9.2 glucose elevated at 112.  Albumin 4.3 alkaline phosphatase 84 AST 37 ALT 31 with ideal ALT less than 35.  Bilirubin elevated 1.4 but not fractionated.  INR normal at 1.19.  Neutrophils 2.39 lymphocytes 3.13. White blood cell count 6.5 hemoglobin elevated 16.2 hematocrit elevated 50.4 platelet count 60,000.  MCV 85.3 please share with primary provider. February labs showed hemoglobin elevated at 16.4 so very similar to this and now slightly lower.  AST was 38 and ALT 37 last time.  Please share labs with primary provider. Meld 10 and meld na 10 so remaining low.  Oct 25: u.s: pancreas normal in size. Liver is heterogeneous in echogenicity. No definite focal hepatic abnormality. Normal doppler flow. Vessels patent. Gallbladder showed a 6mm stone in the neck of the gallbladder. Common bile duct 3-4 mm and right kidney 12cm. Spleen 14.2cm enlarged. Vessels patent. Overall, heterogeneous liver. 6mm stone seen in the neck of gallbladder. Splenomegaly. Patent vessels. On the prior u.s 4mm stone size was mentioned.  Mri: april 11 2022: liver with chronic liver disease and lobar distribution and nodular contour. No hcc. Vessel patent. varices near esoph and stomach and spleen. spleen 14.5cm. Pancreas normal. Enalrged ln seen and mildly inc. Comaptible with CLL.   April 22 ultrasound shows the liver to have a cirrhotic morphology.  It appears to be heterogeneous in echotexture with mild lobulation of the contour. Common bile duct measured 4 mm. There was an echogenic focus in the dependent portion of the gallbladder without shadowing that they felt could be a small stone. No ascites was seen. Pancreas were visualized appeared unremarkable. Right kidney measured 11.5 cm and had normal echogenicity and no hydronephrosis. Liver vessels were patent. Overall they felt that you had a cirrhotic appearing liver with patent vessels and mild splenomegaly.  Gallstones seen but without mention of inflammation. We plan to redo this in 6 months  Hematology is watching him for now with Dr Hobbs and he is to see her next month.  feb 11 2022 na 138 and k 4 and cl 104 and co2 27 and rosa 16 and cr 0.82 and alb 4.4 and tb 1.1 and alk 77 and ast 38 and alt 37 and ldh 228. wbc 7.4 hg 16.4 plat 59. No inr.  Reports continued better control with DM since starting Farxiga 10mg    A1c running at approximately still at 6-7% per pt and this is unchanged in 2023. Does not require regular insulin just prn  and off the metformin.    Weight stable and he is weighing 147 at home and 152 here and prior 155 and at 148.  HLD controlled with lipitor 20 mg.     He does consume alcohol 1-2  drink per year and was encouraged to stop alcohol entirely if possible.  Received COVID Vaccine x 4 (moderna).    Did egd in jan 2021 and grade 1 varices noted.  Per dr. tomlin note recommend repeat in july 2022 and he has not done this. Dr Tomlin and he has to make it.     US 4/27/21- needs repeat in 6 months (Oct 2021).  Denies abdominal distention, weight gain, confusion, GI bleeding, fevers/chills, abdominal pain.    9/22/21- glucose 118 elevated, creatinine 0.79, tbili 0.7, alk phos 116, ast 47 (prev 53), alt 39 (prev 50)- ideal is less than 35, wbc 8.7, rbc 5.95 (please show primary MD), platelets 63 (prev 86), inr 1.1, sodium 144, MELD 7, MELD-Na 7  4/23/21 tb 1.1 alt 49 alp 84 ast 45  April 27 ultrasound shows the liver to be coarsened with mild nodularity but no discrete liver lesion.  Gallstones are seen but without any gallbladder wall thickening or pericholecystic fluid.  Common bile duct is 2.4 mm.  Pancreas appeared normal to them.  Right kidney appeared to be normal in echogenicity with no renal mass.  Spleen was 12.8 cm.  Liver vessels were patent.  Overall the liver appeared to be coarsened with no focal lesion.  Spleen was upper normal.  I wanted to clarify the size of your right kidney and will await that correction.   They corrected the right kidney size to 11.5cm.  RECAP: he did a recent u/s-cirrhosis noted on imaging. no lesions.  past CT * Final Report *  Reason For Exam Splenomegaly; h/o CLL;Splenomegaly;Cirrhosis of liver  REPORT EXAM: CT Abdomen w/ IV Contrast  CLINICAL INDICATION: Splenomegaly; h/o CLL;Splenomegaly;Cirrhosis of liver.  TECHNIQUE: Following administration of non-ionic IV contrast, postcontrast images through the abdomen were obtained. If applicable, point-of-care testing was approved following departmental protocol. [facility].[id number]  COMPARISON: 1/28/2019  FINDINGS:  Lower Thorax: The visualized lung bases are clear. Punctate pleural calcification on the left posteriorly. Coronary artery calcification.  Liver: Hepatic steatosis with a nodular surface contour and fissural widening, unchanged.  Gallbladder/Biliary Tree: Cholelithiasis. No biliary ductal dilatation or gallbladder wall thickening.  Spleen: The spleen measures 13.1 cm in length, previously 11.6 cm at the same level.  Pancreas: Unremarkable.  Adrenal Glands: Unremarkable.   Kidneys/Ureters: Symmetric renal enhancement without obstruction.  Gastrointestinal: No bowel obstruction. Mild, nonspecific wall thickening in the gastric antral pyloric region.   Lymph Nodes: Multiple retroperitoneal lymph nodes are slightly larger. One left para-aortic node (5:33) measures 13 mm short axis, previously 9 mm. One aortocaval node (5:36) measures 9 mm short axis, previously 5 mm. Additional nodes are slightly larger.  Vessels: Moderate atherosclerosis.  Peritoneum/Retroperitoneum: No free fluid.  Bones/Soft Tissues: Osteopenia with degenerative and postsurgical changes in the spine.  IMPRESSION:   1. Slight increase in the size of the spleen. 2. Slight increased size of multiple retroperitoneal lymph nodes. 3. Nonspecific thickening of the gastric antral pylorus wall. 4. Hepatic steatosis with a nodular surface contour and fissural widening consistent with chronic liver disease.    9/15/20 na 141 k 4.6 gluc 109 cr 0.85 tb 1.2 alt 36 ast 39 alp 84  1. Reminded pt to see Dr Tomlin for the egd follow up that was to do in June 2023 and not done post the 6 bands march 2023. He is quit behind on this.  2. Pt will do new labs for us in 3m and 6m and u,s then. 3. He will follow CLL with Dr Hobbs and sees her soon and due to b core needs hep b tx asap. 4. Monitoring nonspecific gb thickening.  5. Rtc in 6m.   Duration of the visit was 35 minutes with 10 minutes of chart prep and 25 minutes for the face to face today  with time spent reviewing their prior and recent records and labs and discussing their current status and future plans for care for the patient.

## 2024-04-16 NOTE — EXAM-PHYSICAL EXAM
Gen: awake and responsive. Eyes: anicteric, normal lids. Mouth: normal lips. Nose: no drainage. Hearing: intact grossly. Neck: trachea midline and no jvd. CV: RRR no s3. Lungs: clear. No wheezes, Abd: Soft, nabs, NR, NT.Spleen tip trace palpable. Ext: no sig edema, some palm erythema. Neuro: moves all 4 ext grossly. No asterixis. Skin: no pruritis and some palm erythema.

## 2024-05-01 ENCOUNTER — OFFICE VISIT (OUTPATIENT)
Dept: URBAN - METROPOLITAN AREA MEDICAL CENTER 28 | Facility: MEDICAL CENTER | Age: 74
End: 2024-05-01
Payer: COMMERCIAL

## 2024-05-01 DIAGNOSIS — K76.6 GASTROPATHY, PORTAL HYPERTENSIVE: ICD-10-CM

## 2024-05-01 DIAGNOSIS — I85.10 ESOPHAGEAL VARICES: ICD-10-CM

## 2024-05-01 DIAGNOSIS — K74.60 CIRRHOSIS: ICD-10-CM

## 2024-05-01 PROCEDURE — 43244 EGD VARICES LIGATION: CPT | Performed by: INTERNAL MEDICINE

## 2024-05-01 RX ORDER — INSULIN LISPRO 100 [IU]/ML
(BG-100)/40 = #UNITS FOR BLOOG GLUCOSE GREATER THAN 180 SUBCUTANEOUSLY BEFORE BREAKFAST, LUNCH, AND DINNER INJECTION, SOLUTION INTRAVENOUS; SUBCUTANEOUS
Qty: 10 | Refills: 0 | Status: ACTIVE | COMMUNITY

## 2024-05-01 RX ORDER — LOSARTAN POTASSIUM 25 MG/1
1 TABLET TABLET ORAL ONCE A DAY
Refills: 0 | Status: ACTIVE | COMMUNITY
Start: 1900-01-01

## 2024-05-01 RX ORDER — ATORVASTATIN CALCIUM 20 MG/1
1 TABLET TABLET, FILM COATED ORAL ONCE A DAY
Status: ACTIVE | COMMUNITY

## 2024-05-01 RX ORDER — DAPAGLIFLOZIN 10 MG/1
1 TABLET TABLET, FILM COATED ORAL ONCE A DAY
Status: ACTIVE | COMMUNITY

## 2024-05-01 RX ORDER — ASPIRIN 81 MG/1
1 TABLET TABLET, COATED ORAL ONCE A DAY
Status: ACTIVE | COMMUNITY

## 2024-05-01 RX ORDER — CALCIUM CARBONATE 500(1250)
2 TABLETS TABLET ORAL ONCE A DAY
Status: ACTIVE | COMMUNITY

## 2024-05-01 RX ORDER — SYRINGE-NEEDLE,INSULIN,0.5 ML 30 G X1/2"
USE 3 SYRINGES A DAY TO INJECT INSULIN SYRINGE, EMPTY DISPOSABLE MISCELLANEOUS
Qty: 100 | Refills: 0 | Status: ACTIVE | COMMUNITY

## 2024-05-01 RX ORDER — METOPROLOL TARTRATE 25 MG/1
TAKE 1 TABLET (25 MG) BY ORAL ROUTE ONCE DAILY TABLET, FILM COATED ORAL 1
Qty: 0 | Refills: 0 | Status: ACTIVE | COMMUNITY
Start: 1900-01-01

## 2024-07-08 ENCOUNTER — LAB OUTSIDE AN ENCOUNTER (OUTPATIENT)
Dept: URBAN - METROPOLITAN AREA CLINIC 86 | Facility: CLINIC | Age: 74
End: 2024-07-08

## 2024-10-01 ENCOUNTER — CLAIMS CREATED FROM THE CLAIM WINDOW (OUTPATIENT)
Dept: URBAN - METROPOLITAN AREA CLINIC 97 | Facility: CLINIC | Age: 74
End: 2024-10-01
Payer: COMMERCIAL

## 2024-10-01 ENCOUNTER — LAB OUTSIDE AN ENCOUNTER (OUTPATIENT)
Dept: URBAN - METROPOLITAN AREA CLINIC 86 | Facility: CLINIC | Age: 74
End: 2024-10-01

## 2024-10-01 ENCOUNTER — TELEPHONE ENCOUNTER (OUTPATIENT)
Dept: URBAN - METROPOLITAN AREA CLINIC 86 | Facility: CLINIC | Age: 74
End: 2024-10-01

## 2024-10-01 DIAGNOSIS — K74.60 CIRRHOSIS: ICD-10-CM

## 2024-10-01 DIAGNOSIS — K82.8 THICKENING OF WALL OF GALLBLADDER: ICD-10-CM

## 2024-10-01 DIAGNOSIS — R16.1 SPLENOMEGALY: ICD-10-CM

## 2024-10-01 DIAGNOSIS — K76.0 FATTY (CHANGE OF) LIVER: ICD-10-CM

## 2024-10-01 PROCEDURE — 76705 ECHO EXAM OF ABDOMEN: CPT

## 2024-10-01 PROCEDURE — 93975 VASCULAR STUDY: CPT

## 2024-10-01 NOTE — HPI-TODAY'S VISIT:
Dear Shelia Lima, October 1:  Ultrasound showed visualized pancreas portions appeared unremarkable. Liver coarsening and its echotexture and with no suspicious liver lesion. Common bile duct was 4 mm. Gallbladder wall thickening measuring 6 mm was seen which may be due to its contracted state and underlying chronic liver disease.  There was a 6 mm gallstone that was seen.  No gallbladder distention or pericholecystic fluid seen however. You did have small volume ascites that was seen and important that you go or stay on a low-salt diet to keep this under control. Right kidney 13.1 cm with no hydronephrosis. Liver vessels patent and splenic vessels patent as well. Spleen was enlarged at 16 cm.   Overall, they felt that you had a cirrhotic liver with patent vessels and some nonspecific gallbladder wall thickening that could be related due to cirrhosis or portal hypertension.  Gallstone was seen.  Splenomegaly was seen.  Some small volume ascites was seen.   It does appear that the ascites has gone from trace ascites in April now to small volume.  We may need to talk about starting you on a diuretic (water pill) after we assess your abdomen to try to help with his fluid retention issue.  Please try to be stricter on your salt content of your diet to help with these issues and hopefully try to avoid this. Thank you. Dr. Brady

## 2024-10-08 ENCOUNTER — OFFICE VISIT (OUTPATIENT)
Dept: URBAN - METROPOLITAN AREA CLINIC 86 | Facility: CLINIC | Age: 74
End: 2024-10-08
Payer: COMMERCIAL

## 2024-10-08 ENCOUNTER — DASHBOARD ENCOUNTERS (OUTPATIENT)
Age: 74
End: 2024-10-08

## 2024-10-08 VITALS
WEIGHT: 138 LBS | HEIGHT: 65 IN | HEART RATE: 69 BPM | DIASTOLIC BLOOD PRESSURE: 60 MMHG | SYSTOLIC BLOOD PRESSURE: 119 MMHG | BODY MASS INDEX: 22.99 KG/M2 | TEMPERATURE: 96.6 F

## 2024-10-08 DIAGNOSIS — E78.5 HYPERLIPIDEMIA: ICD-10-CM

## 2024-10-08 DIAGNOSIS — C91.10 CLL (CHRONIC LYMPHOCYTIC LEUKEMIA): ICD-10-CM

## 2024-10-08 DIAGNOSIS — I10 HYPERTENSION: ICD-10-CM

## 2024-10-08 DIAGNOSIS — M67.40 GANGLION CYST: ICD-10-CM

## 2024-10-08 DIAGNOSIS — R74.8 ABNORMAL LIVER ENZYMES: ICD-10-CM

## 2024-10-08 DIAGNOSIS — K76.0 FATTY LIVER: ICD-10-CM

## 2024-10-08 DIAGNOSIS — I85.10 SECONDARY ESOPHAGEAL VARICES WITHOUT BLEEDING: ICD-10-CM

## 2024-10-08 DIAGNOSIS — Z87.09 HX OF INTRINSIC ASTHMA: ICD-10-CM

## 2024-10-08 DIAGNOSIS — E55.9 VITAMIN D DEFICIENCY: ICD-10-CM

## 2024-10-08 DIAGNOSIS — K31.89 INTESTINAL METAPLASIA OF GASTRIC CARDIA: ICD-10-CM

## 2024-10-08 DIAGNOSIS — K74.60 CIRRHOSIS: ICD-10-CM

## 2024-10-08 DIAGNOSIS — Z79.899 HIGH RISK MEDICATION USE: ICD-10-CM

## 2024-10-08 PROCEDURE — 99214 OFFICE O/P EST MOD 30 MIN: CPT

## 2024-10-08 RX ORDER — INSULIN LISPRO 100 [IU]/ML
(BG-100)/40 = #UNITS FOR BLOOG GLUCOSE GREATER THAN 180 SUBCUTANEOUSLY BEFORE BREAKFAST, LUNCH, AND DINNER INJECTION, SOLUTION INTRAVENOUS; SUBCUTANEOUS
Qty: 10 | Refills: 0 | Status: ACTIVE | COMMUNITY

## 2024-10-08 RX ORDER — SYRINGE-NEEDLE,INSULIN,0.5 ML 30 G X1/2"
USE 3 SYRINGES A DAY TO INJECT INSULIN SYRINGE, EMPTY DISPOSABLE MISCELLANEOUS
Qty: 100 | Refills: 0 | Status: ACTIVE | COMMUNITY

## 2024-10-08 RX ORDER — ATORVASTATIN CALCIUM 20 MG/1
1 TABLET TABLET, FILM COATED ORAL ONCE A DAY
Status: ACTIVE | COMMUNITY

## 2024-10-08 RX ORDER — DAPAGLIFLOZIN 10 MG/1
1 TABLET TABLET, FILM COATED ORAL ONCE A DAY
Status: ACTIVE | COMMUNITY

## 2024-10-08 RX ORDER — CALCIUM CARBONATE 500(1250)
2 TABLETS TABLET ORAL ONCE A DAY
Status: ACTIVE | COMMUNITY

## 2024-10-08 RX ORDER — METOPROLOL TARTRATE 25 MG/1
TAKE 0.5 TABLET BY ORAL ROUTE ONCE DAILY TABLET, FILM COATED ORAL 1
Refills: 0 | Status: ACTIVE | COMMUNITY
Start: 1900-01-01

## 2024-10-08 RX ORDER — LOSARTAN POTASSIUM 25 MG/1
1 TABLET TABLET ORAL ONCE A DAY
Refills: 0 | Status: DISCONTINUED | COMMUNITY
Start: 1900-01-01

## 2024-10-08 RX ORDER — ASPIRIN 81 MG/1
1 TABLET TABLET, COATED ORAL ONCE A DAY
Status: ACTIVE | COMMUNITY

## 2024-10-08 NOTE — HPI-TODAY'S VISIT:
Pt is a 74 year male, after a previous visit on APRIL 2024 for an evaluation for elevated liver enzymes and and fatty liver and cirrhosis and CLL and hep b core pos.  A copy of this document will be sent to the referring provider.     Dr Hobbs did mri: Oct 2 at [facility]: She does one once a year.  FINDINGS: Lower Thorax: Normal. Liver: Iron deposition. Liver has a cirrhotic morphology, with a nodular contour, relative hypertrophy of the left lobe, and T2 hyperintense fine reticulations compatible with fibrosis. More conspicuous ill-defined arterial enhancing foci for example  (9:24, 34, 35) without correlate on additional sequences, likely perfusional. No suspicious liver lesions. Conventional hepatic arterial anatomy. Gallbladder/Biliary Tree: Single gallstone with diffuse submucosal edema and wall thickening could be secondary to third spacing/volume overload. No biliary ductal dilatation. Spleen: Splenomegaly measuring 16.5 cm in the oblique craniocaudal dimension, previously 14.5 cm, when measured similarly. Similar nonenhancing punctate foci. Pancreas: Intrinsic T1 hyperintensity is slightly reduced. No main ductal dilatation.  Adrenal Glands: Normal Kidneys/Ureters: Small renal cysts including renal sinus cysts. No hydronephrosis. Symmetrical enhancement. Gastrointestinal: No bowel obstruction. Stool throughout the visualized colon. Lymph Nodes: Interval increased peripancreatic, periportal, and retroperitoneal lymph nodes, for example aortocaval lymph node conglomerate measures approximately 5.1 x 3.2 cm (4:27), previously 2.9 x 1.3 cm. Single aortocaval lymph node measures 1.6 x 3.3 cm (Series 9, Image 71), previously 1.4 x 2.9 cm Peripancreatic lymph node adjacent to the tail measures 2.8 x 1.7 cm (20:20), previously 2 x 1.1 cm. Partially visualized enlarged cardiophrenic (measuring 9 mm) and axillary lymph nodes measuring up to 2.3 cm in the right axilla, for example. Vessels: Normal caliber abdominal aorta. Encased IVC is compressed by the surrounding daniel enlargement in the retroperitoneum without hemodynamically significant stenosis. Similar encasement of the left renal vein without stenosis. Patent portal vein.  Perigastric and perisplenic varices. Peritoneum/Retroperitoneum: Mild ascites. Mesenteric edema.  Bones/Soft Tissues: Susceptibility artifact from L4-L5 spinal fixation hardware and mild degenerative changes. No suspicious enhancing osseous lesions.   IMPRESSION: 1. No suspicious hepatic lesions. Punctate arterially enhancing observations without correlate on additional sequences, favored to represent perfusion anomalies (LR-2).  2. Morphologic features of cirrhosis with stigmata of portal hypertension including mild ascites, upper abdominal varices and splenomegaly. 3. Significant increase in the size of numerous retroperitoneal lymph nodes above and below the diaphragm with increase in the degree of splenomegaly concerning for worsening lymphoproliferative process.  Asked what her plan is left the practice and he is to see a new doctor there for his this week to go over scan.  October 1: Ultrasound showed visualized pancreas portions appeared unremarkable. Liver coarsening and its echotexture and with no suspicious liver lesion. Common bile duct was 4 mm. Gallbladder wall thickening measuring 6 mm was seen which may be due to its contracted state and underlying chronic liver disease. There was a 6 mm gallstone that was seen. No gallbladder distention or pericholecystic fluid seen however. You did have small volume ascites that was seen and important that you go or stay on a low-salt diet to keep this under control. Right kidney 13.1 cm with no hydronephrosis. Liver vessels patent and splenic vessels patent as well. Spleen was enlarged at 16 cm. Overall, they felt that you had a cirrhotic liver with patent vessels and some nonspecific gallbladder wall thickening that could be related due to cirrhosis or portal hypertension. Gallstone was seen. Splenomegaly was seen. Some small volume ascites was seen.  It does appear that the ascites has gone from trace ascites in April now to small volume. We may need to talk about starting you on a diuretic (water pill) after we assess your abdomen to try to help with his fluid retention issue. Please try to be stricter on your salt content of your diet to help with these issues and hopefully try to avoid this.  Pt says eh is on low salt diet.   he will need to let us know what they start him on if anything.  May for 2024 EGD showed grade 2 esophageal varices that were medium size in the lower third of the esophagus and 5 bands were placed.  He had complete eradication resulting in deflation of varices.  No bleeding seen.  Mild diffuse portal hypertensive gastropathy seen in the gastric body and duodenum normal.  He recommended a 3-month repeat which would have been for August but I do not see that that was done.  He is overdue for the egd.   Last visit not on any tx yet for the CLL. Discussed if has any tx to start may need hep b suppressive tx as b core positive  Sept 20 2024 he did for Dr Anjel: folate 13.2 and na 139 and k 4.4 and cl 107 and co2 25 and ca 8.7 and bun 17 and cr 0.89 amd glu 109 and tp 6.8 and alb 3.6 and alk 115 and ast 57 and alt 28 and tb 1.1 and iron sat 9% and low and needs iron (says he was given iron iv for this x 2).  b12 265 and  wbc 8.5 and hg 11.5 little low and hct 39.4 and platelets 45.  neutrophils 2.05 and lymphs 5.22 elevated.   April 12 labs showed glucose elevated at 137 and previously was elevated at 126. Please share with primary provider. He was fasting and he will look at with primary. Bun of 18 creatinine 0.84 sodium 140 potassium 4.9 calcium 9.0 albumin 4.3 bilirubin 0.8 alk phos 120 with AST 62 up from 52 last time and ALT 29. Unfortunately since June of last year your AST went from 33-52-62. Are you on a new medicine? Have you been ill recently? No new reason. WBC 7.3 which is stable for you hemoglobin a little lower at 11.6 from 12.7 in January. MCV normal at 79 but still just at the lower limit of normal. Platelets 56,000 and previously 58,000. Neutrophils normal at 1.9 the lymphocytes a little up at 4.1. INR normal at 1.1.  April 9 ultrasound shows pancreas not well-seen due to gas and body habitus and very limited assessment unremarkable. The liver continues to be coarsened echotexture with no lesions. Liver vessels were patent as expected. Mild gallbladder wall thickening seen at 6 mm but no pericholecystic fluid and an echogenic gallstone was seen.  They suspect that this could be related due to chronic liver disease and less likely due to gallbladder inflammation.  They recommended for us to correlate with clinical history such as if you have symptoms or not of any pain. Common bile duct was normal at 4 mm. Right kidney 12.4 cm with no hydronephrosis. Spleen is mildly enlarged at 14.7 cm and you have trace ascites in the perisplenic space.  Very important that you maintain a low-salt diet with this history. Splenic vessels were patent as expected. In summary, the liver is coarsened echotexture but with no lesions and consistent with chronic liver disease/cirrhosis etiology.  Nonspecific gallbladder wall thickening that could be related due to chronic liver disease or less likely due to gallbladder issues.  Please let us know if you having any symptoms.   Doug 3 labs show sugar up at 126 and possibly not fasting this day? BUN of 13 and creatinine 0.78 both normal. Sodium 141 potassium 4.1 albumin 4.5 bilirubin 0.9 alk phos 96 with AST up a little at 52 and ALT of 34 and previously AST 33 and ALT 22. Sometimes patients with fatty liver near the holidays go up a little underweight or become less active and that can usually cause this to worsen in the winter months. WBC 7.3 hemoglobin a little low but higher at 12.7 from 11.5 previously. MCV was low normal at 79. The trend is getting a little lower which makes me worry if you are getting close to being iron deficient? Platelet count was low at 58 from previous 66. Neutrophils normal at 1.9 but lymphocytes up at 4.4. Previously they have been 2.9 and prior to that 3.1. INR 1.2 which is normal range still. Meld low at 8 and meld na 8.   Dec 28 2023 : partially visualized pancreas unremarkable. Coarsened echotexture of the liver parenchyma with surface nodularity and compatible with cirrhosis. No focal liver mass. Mild thickening of the gallbladder wall without pericholecystic fluid and similar to prior study. Some gallstones seen up to 6mm. Common bile duct 4mm and normal. Right kidney 12.4cm and no hydronephrosis. Spleen 14.8cm. Trace perisplenic ascites seen. Need you to stay on low salt diet with this issue. Doppler vessels patent. In summary, cirrhosis and portal hypertension changes seen in the form of splenomegaly and trace ascites. No focal mass.  Gallstones seen without acute cholecystitits and apparent mild gallbaldder wall thickening and no pericholecystic fluid favoring due to chronic liver disease.  Doppler vessels patent but hepatic aretery slightly decreased in resistive index and nonspecific.  Need liver labs to correlate with this.  Pt says he is on low salt diet. [place] so we can do the u.s in 3m and do visit to review that.  June 9 labs show glucose of 84 which is normal this time. Previously had been elevated at 118 on a comparison labs that they gave us from 2 years ago. Please share with primary provider to be aware of. BUN of 14 creatinine slightly low at 0.71 sodium 140 potassium 4.1. Chloride slightly up at 107 and may reflect hydrational state that day. Calcium 8.7 albumin 4.0 bilirubin 0.7 alk phos 87 with an AST of 33 and ALT of 22 which appeared to be lower than the comparison labs that they gave on the current sample but again from 2 years ago. Ideal ALT is less than 35. WBC 5.7 hemoglobin was low though at 11.5 hematocrit low at 35.5 with a previous comparison values from 2 years ago being normal. Platelet count was slightly higher now at 66 from previous comparison level of 63. MCV normal at 83 and neutrophils 2.1 and lymphocytes 2.9. INR normal at 1.2. From the note, we saw some more recent labs that you did with Dr. Hobbs that it showed that your hemoglobin likewise was higher at 14.7 and it is clearly lower now platelets were 57,000 then. Liver labs were AST of 36 and ALT of 25 so those are very close to these.  The main difference appears to be that your hemoglobin is a little lower and I would asked that you follow-up with Dr. Jessica Hobbs your hematologist regarding this issue.  Dr Hobbs to see May and told all is ok.  May 30 2023 ultrasound shows liver is mildly increased/coarsened in echotexture but with no suspicious lesions. Common bile duct was normal at 3.1 mm. A likely small 6 mm calculus/stone was seen in the gallbladder.  They felt that the gallbladder wall measurement of 4.2 mm was a technical error estimation.  No pericholecystic fluid was seen. Pancreatic tail was not seen due to gas and that the image pancreas was otherwise grossly unremarkable. Right kidney was 12.3 cm with no stones or hydronephrosis.  Spleen was enlarged at 14 cm with no splenic lesions. No ascites was seen. Liver vessels were patent as expected. Splenic vessels were patent. In summary, they felt that you had an increase/coarsened hepatic echotexture in keeping with your history of fatty liver and early chronic parenchymal liver disease.  No suspicious lesions were seen.  Probable 6 mm gallstone seen with no evidence of acute cholecystitis that they could clearly see.  Splenomegaly was seen as well. Of note, on your previous ultrasound last year you had a 6 mm stone also seen in the gallbladder and your spleen measured 14.2 cm then.  Last labs he did from Dr Hobbs were 2m ago.  2023 labs done by Dr. Jessica Hobbs show AFP normal at 2.6.   sodium 137 potassium 3.7 chloride 105 CO2 25 BUN 17 creatinine 0.73 glucose 230 albumin 4.2 alk phos 84 AST 36 ALT 25 bilirubin 1.2 elevated folate 16.9 and ferritin 37 B12 447 iron sat 19% WBC 5.2 hemoglobin 14.7 plate count 57 MCV 83.6  He sees her in 3m and so she will see soon and do the full labs then,  Jan 2023 ct  Narrative & Impression EXAM: CT ANGIO CHEST W IV CONTRAST, CT ANGIO ABDOMEN PELVIS W IV CONTRAST   CLINICAL INDICATION: TAVR/progress trial.   TECHNIQUE: Non-ECG-gated CT images of the chest, abdomen and pelvis were obtained with nonionic intravenous contrast according to CT angiogram protocol.  Advanced offline 3D post-processing was performed utilizing a dedicated 3D workstation.  There were no immediate complications reported. If applicable, point-of-care testing?was approved following departmental protocol.   COMPARISON: 1/28/2019.   FINDINGS:   VASCULAR: 3 leaflet aortic valve however, there is leaflet thickening and calcification and appearances are compatible with at least moderate aortic stenosis. Normal caliber aortic root. No coronary artery anomaly. Moderately severe coronary artery calcification. Normal caliber thoracic aorta with no significant atherosclerotic calcification or atheromatous plaque. No significant tortuosity. Coronary artery bypass grafting with left internal mammary and at least one saphenous vein bypass grafts. 3 vessel aortic arch. The branch vessels are normal caliber patent. Normal caliber abdominal aorta with mild gross atherosclerotic calcification. No significant plaque. No significant tortuosity. Patent celiac axis, SMA, bilateral renal arteries and accessory right renal artery and GERALDO. Normal caliber iliac and common femoral arteries with moderate atherosclerotic calcification. Focal calcified plaque with in the mid right external iliac artery with a luminal diameter minimally measuring 7 mm. Calcified plaque within the left common iliac artery. The luminal diameter minimally measures 6 mm.   CHEST: The lower neck and thoracic inlet are unremarkable.   Heart size is within normal limits. Normal caliber central pulmonary arteries. Small patent foramen ovale. Minor mitral valve annular calcification. No pericardial effusion or thickening.   There are a few subcentimeter lymph nodes. There are borderline enlarged axillary lymph nodes with the largest axillary lymph node on the right measuring 18 mm (in maximum short axis diameter) and on the left 12 mm. Lymph node enlargement is new compared to the prior study. Appearances may represent reactive lymph nodes. Is there a history of lymphoma proliferative disorder such as CLL/SLL?   The central airways are patent. No new or growing nodules calcified old granulomatous disease. No pleural effusion or thickening.   ABDOMEN/PELVIS: No focal abnormality within the liver. No biliary duct dilatation. Tiny calcified calculus within the gallbladder. No cholecystitis. Splenomegaly. The spleen measures 15 cm, 12 cm on the previous study.   The adrenal glands are unremarkable. Normal size kidneys with good cortical thickness and symmetric enhancement.   The pancreas is unremarkable. Minor diverticulosis without diverticulitis. The bowel and mesentery are otherwise on remarkable.   Unremarkable urinary bladder. Normal size prostate.   No free fluid. No lymph node enlargement.   BONES/SOFT TISSUES: Sternotomy wires. Degenerative change. Osteophytes. Screws within the lumbar spine. No aggressive osseous lesion.     IMPRESSION: 3 leaflet aortic valve however, there is leaflet thickening and calcification and appearances are compatible with at least moderate aortic stenosis.    Moderately severe coronary artery calcification. Coronary artery bypass grafting with left internal mammary and at least one saphenous vein bypass grafts.   Normal caliber thoracic aorta with no significant atherosclerotic calcification or atheromatous plaque. No significant tortuosity.   Normal caliber abdominal aorta with mild gross atherosclerotic calcification. No significant plaque. No significant tortuosity.   Normal caliber iliac and common femoral arteries with moderate atherosclerotic calcification. Focal calcified plaque within the mid right external iliac artery with a luminal diameter minimally measuring 7 mm. Calcified plaque within the left common iliac artery. The luminal diameter minimally measures 6 mm.   New bilateral axillary lymph node enlargement is an interval enlargement of the spleen. Appearances are compatible with a lymphoproliferative disorder, specifically CLL/SLL.    Jan 2023 Dr Sesay visit: Hi risk for SAVR due to age, comorbidity, redo Agree with evaluation for Progress moderate AS trial  Lio Sesay MD  He says since then June 13 and maybe doing valve surgery then.  March 20 egd                                                                              Findings:      Grade II varices were found in the middle third of the esophagus and in       the lower third of the esophagus. They were medium in size. Six bands       were successfully placed with complete eradication, resulting in       deflation of varices. There was no bleeding during and at the end of the       procedure. Estimated blood loss was minimal.      The stomach was normal.      The examined duodenum was normal.                                                                                 Impression:            - Grade II esophageal varices. Completely eradicated.                         Banded.                        - Normal stomach.                        - Normal examined duodenum.                        - No specimens collected. Recommendation:        - Perform a colonoscopy today.                        - Repeat upper endoscopy in 3 months for retreatment.   He does the scopes with Dr Tomlin to for the follow up.  March 20 colon: Findings:      The perianal and digital rectal examinations were normal.      The terminal ileum appeared normal.      The colon (entire examined portion) appeared normal.      Non-bleeding internal hemorrhoids were found during retroflexion. The       hemorrhoids were mild.                                                                                 Impression:            - The examined portion of the ileum was normal.                        - The entire examined colon is normal.                        - Non-bleeding internal hemorrhoids.                        - No specimens collected. Recommendation:        - Discharge patient to home (via wheelchair).                        - Repeat colonoscopy in 5 years for surveillance.                        - Return to GI office PRN.  He was last seen on February 15, 2023 by Jyoti Diehl PA-C to talk about EGD surveillance.  Last EGD had been in 2021 and it showed grade 1 varices in patchy mildly erythematous mucosa without bleeding in the gastric antrum.  EGD and colonoscopy were top be set up but apparently they were set up for March 27 the patient called on February 17 to do it sooner elsewhere.  October 2022: [place] labs sent to me. Sodium was 138, potassium 3.9 chloride 105 CO2 23 BUN of 14 creatinine 0.66 calcium 9.2 glucose elevated at 112.  Albumin 4.3 alkaline phosphatase 84 AST 37 ALT 31 with ideal ALT less than 35.  Bilirubin elevated 1.4 but not fractionated.  INR normal at 1.19.  Neutrophils 2.39 lymphocytes 3.13. White blood cell count 6.5 hemoglobin elevated 16.2 hematocrit elevated 50.4 platelet count 60,000.  MCV 85.3 please share with primary provider. February labs showed hemoglobin elevated at 16.4 so very similar to this and now slightly lower.  AST was 38 and ALT 37 last time.  Please share labs with primary provider. Meld 10 and meld na 10 so remaining low.  Oct 25: u.s: pancreas normal in size. Liver is heterogeneous in echogenicity. No definite focal hepatic abnormality. Normal doppler flow. Vessels patent. Gallbladder showed a 6mm stone in the neck of the gallbladder. Common bile duct 3-4 mm and right kidney 12cm. Spleen 14.2cm enlarged. Vessels patent. Overall, heterogeneous liver. 6mm stone seen in the neck of gallbladder. Splenomegaly. Patent vessels. On the prior u.s 4mm stone size was mentioned.  Mri: april 11 2022: liver with chronic liver disease and lobar distribution and nodular contour. No hcc. Vessel patent. varices near esoph and stomach and spleen. spleen 14.5cm. Pancreas normal. Enalrged ln seen and mildly inc. Comaptible with CLL.   April 22 ultrasound shows the liver to have a cirrhotic morphology.  It appears to be heterogeneous in echotexture with mild lobulation of the contour. Common bile duct measured 4 mm. There was an echogenic focus in the dependent portion of the gallbladder without shadowing that they felt could be a small stone. No ascites was seen. Pancreas were visualized appeared unremarkable. Right kidney measured 11.5 cm and had normal echogenicity and no hydronephrosis. Liver vessels were patent. Overall they felt that you had a cirrhotic appearing liver with patent vessels and mild splenomegaly.  Gallstones seen but without mention of inflammation. We plan to redo this in 6 months  Hematology is watching him for now with Dr Hobbs and he is to see her next month.  feb 11 2022 na 138 and k 4 and cl 104 and co2 27 and rosa 16 and cr 0.82 and alb 4.4 and tb 1.1 and alk 77 and ast 38 and alt 37 and ldh 228. wbc 7.4 hg 16.4 plat 59. No inr.  Reports continued better control with DM since starting Farxiga 10mg    A1c running at approximately still at 6-7% per pt and this is unchanged in 2023. Does not require regular insulin just prn  and off the metformin.    Weight stable and he is weighing 147 at home and 152 here and prior 155 and at 148.  HLD controlled with lipitor 20 mg.     He does consume alcohol 1-2  drink per year and was encouraged to stop alcohol entirely if possible.  Received COVID Vaccine x 4 (moderna).    Did egd in jan 2021 and grade 1 varices noted.  Per dr. tomlin note recommend repeat in july 2022 and he has not done this. Dr Tomlin and he has to make it.      4/27/21- needs repeat in 6 months (Oct 2021).  Denies abdominal distention, weight gain, confusion, GI bleeding, fevers/chills, abdominal pain.    9/22/21- glucose 118 elevated, creatinine 0.79, tbili 0.7, alk phos 116, ast 47 (prev 53), alt 39 (prev 50)- ideal is less than 35, wbc 8.7, rbc 5.95 (please show primary MD), platelets 63 (prev 86), inr 1.1, sodium 144, MELD 7, MELD-Na 7  4/23/21 tb 1.1 alt 49 alp 84 ast 45  April 27 ultrasound shows the liver to be coarsened with mild nodularity but no discrete liver lesion.  Gallstones are seen but without any gallbladder wall thickening or pericholecystic fluid.  Common bile duct is 2.4 mm.  Pancreas appeared normal to them.  Right kidney appeared to be normal in echogenicity with no renal mass.  Spleen was 12.8 cm.  Liver vessels were patent.  Overall the liver appeared to be coarsened with no focal lesion.  Spleen was upper normal.  I wanted to clarify the size of your right kidney and will await that correction.   They corrected the right kidney size to 11.5cm.  RECAP: he did a recent u/s-cirrhosis noted on imaging. no lesions.  past CT * Final Report *  Reason For Exam Splenomegaly; h/o CLL;Splenomegaly;Cirrhosis of liver  REPORT EXAM: CT Abdomen w/ IV Contrast  CLINICAL INDICATION: Splenomegaly; h/o CLL;Splenomegaly;Cirrhosis of liver.  TECHNIQUE: Following administration of non-ionic IV contrast, postcontrast images through the abdomen were obtained. If applicable, point-of-care testing was approved following departmental protocol. [facility].[id number]  COMPARISON: 1/28/2019  FINDINGS:  Lower Thorax: The visualized lung bases are clear. Punctate pleural calcification on the left posteriorly. Coronary artery calcification.  Liver: Hepatic steatosis with a nodular surface contour and fissural widening, unchanged.  Gallbladder/Biliary Tree: Cholelithiasis. No biliary ductal dilatation or gallbladder wall thickening.  Spleen: The spleen measures 13.1 cm in length, previously 11.6 cm at the same level.  Pancreas: Unremarkable.  Adrenal Glands: Unremarkable.   Kidneys/Ureters: Symmetric renal enhancement without obstruction.  Gastrointestinal: No bowel obstruction. Mild, nonspecific wall thickening in the gastric antral pyloric region.   Lymph Nodes: Multiple retroperitoneal lymph nodes are slightly larger. One left para-aortic node (5:33) measures 13 mm short axis, previously 9 mm. One aortocaval node (5:36) measures 9 mm short axis, previously 5 mm. Additional nodes are slightly larger.  Vessels: Moderate atherosclerosis.  Peritoneum/Retroperitoneum: No free fluid.  Bones/Soft Tissues: Osteopenia with degenerative and postsurgical changes in the spine.  IMPRESSION:   1. Slight increase in the size of the spleen. 2. Slight increased size of multiple retroperitoneal lymph nodes. 3. Nonspecific thickening of the gastric antral pylorus wall. 4. Hepatic steatosis with a nodular surface contour and fissural widening consistent with chronic liver disease.    9/15/20 na 141 k 4.6 gluc 109 cr 0.85 tb 1.2 alt 36 ast 39 alp 84  A: 1. Reminded pt to see Dr Tomlin for the 3m egd that he is overdue as they banded.   2. Pt will see new heme re mri and the labs and next steps  3. If starts CLL tx with Dr Hobbs's replacement that they need to be aware hep b core positive and needs hep b tx asap before he starts that.  4. Monitoring nonspecific gb thickening.  5. Rtc in 3 m. 6. Asked him to let us know what happens.   Duration of the visit was 35  minutes with 10 minutes of chart prep and 25 minutes for the face to face today  with time spent reviewing their prior and recent records and labs and discussing their current status and future plans for care for the patient.

## 2024-10-08 NOTE — EXAM-PHYSICAL EXAM
Gen: awake and responsive. Eyes: anicteric, normal lids. Mouth: mask on Nose: mask on Hearing: intact grossly. Neck: trachea midline and no jvd. CV: RRR no s3. Noted systolic murmur Lungs: clear. No wheezes, Abd: Soft, nabs, NR, NT.Spleen tip trace palpable. No clear fluid wave. Ext: no sig edema, some palm erythema. Neuro: moves all 4 ext grossly. No asterixis. Skin: no pruritis and some palm erythema.

## 2025-01-01 ENCOUNTER — LAB OUTSIDE AN ENCOUNTER (OUTPATIENT)
Dept: URBAN - METROPOLITAN AREA CLINIC 86 | Facility: CLINIC | Age: 75
End: 2025-01-01

## 2025-01-07 ENCOUNTER — OFFICE VISIT (OUTPATIENT)
Dept: URBAN - METROPOLITAN AREA CLINIC 97 | Facility: CLINIC | Age: 75
End: 2025-01-07
Payer: COMMERCIAL

## 2025-01-07 DIAGNOSIS — R18.8 ASCITES: ICD-10-CM

## 2025-01-07 DIAGNOSIS — K74.60 CIRRHOSIS: ICD-10-CM

## 2025-01-07 DIAGNOSIS — R16.1 SPLENOMEGALY: ICD-10-CM

## 2025-01-07 DIAGNOSIS — K76.0 FATTY (CHANGE OF) LIVER: ICD-10-CM

## 2025-01-07 DIAGNOSIS — K80.20 GALLSTONE: ICD-10-CM

## 2025-01-07 PROCEDURE — 76705 ECHO EXAM OF ABDOMEN: CPT

## 2025-01-08 ENCOUNTER — TELEPHONE ENCOUNTER (OUTPATIENT)
Dept: URBAN - METROPOLITAN AREA CLINIC 86 | Facility: CLINIC | Age: 75
End: 2025-01-08

## 2025-01-08 NOTE — HPI-TODAY'S VISIT:
Dear Shelia Lima,   January 7 ultrasound shows liver echogenicity appears coarsened and increased with no lesions. Portal vein was patent/open as expected. Gallstone seen in gallbladder but no size given this time. Gallbladder wall thickened 6 mm.  Common bile duct was 4 mm. Small amount of ascites was seen so important that you stay on a low-salt diet. Pancreas were visualized appeared normal.  Head and tail not well-seen. Right kidney measured 11.8 cm with no hydronephrosis. Spleen enlarged at 16.2 cm. In summary, coarsened echotexture seen consistent with your history of cirrhosis and no focal lesions.  Small gallstone seen with thickened gallbladder wall at 6 mm.  Small ascites.  If clinical symptoms of concern consider HIDA was recommended. Splenomegaly was seen.   As you recall you had am mri scan done in October 2 2024 at [facility] for your oncologist/hematologist.  The liver was cirrhotic and nodular then with some signs of fibrosis seen as well.  They did mention you had some perfusional changes which typically they did recommend to be assessed intermittently. No suspicious lesions seen.  The gallbladder had a single gallstone with diffuse submucosal edema and wall thickening which they thought could be third spacing of volume overload and no bile duct dilation.  Spleen was 16.5 cm and which was very similar and previously had been 14.5 cm.   Back in October of last year your ultrasound showed gallbladder wall thickening at 6 mm and a 6 mm gallstone as well.  Small volume ascites has been seen prior also. Dr. Brady

## 2025-01-10 ENCOUNTER — OFFICE VISIT (OUTPATIENT)
Dept: URBAN - METROPOLITAN AREA TELEHEALTH 2 | Facility: TELEHEALTH | Age: 75
End: 2025-01-10
Payer: COMMERCIAL

## 2025-01-10 ENCOUNTER — LAB OUTSIDE AN ENCOUNTER (OUTPATIENT)
Dept: URBAN - METROPOLITAN AREA TELEHEALTH 2 | Facility: TELEHEALTH | Age: 75
End: 2025-01-10

## 2025-01-10 VITALS — HEIGHT: 65 IN | WEIGHT: 144 LBS | BODY MASS INDEX: 23.99 KG/M2

## 2025-01-10 DIAGNOSIS — I85.00 ESOPHAGEAL VARICES WITHOUT BLEEDING, UNSPECIFIED ESOPHAGEAL VARICES TYPE: ICD-10-CM

## 2025-01-10 DIAGNOSIS — R76.8 HEPATITIS B CORE ANTIBODY POSITIVE: ICD-10-CM

## 2025-01-10 DIAGNOSIS — E66.89 OTHER OBESITY NOT ELSEWHERE CLASSIFIED: ICD-10-CM

## 2025-01-10 DIAGNOSIS — M67.40 GANGLION CYST: ICD-10-CM

## 2025-01-10 DIAGNOSIS — I85.10 SECONDARY ESOPHAGEAL VARICES WITHOUT BLEEDING: ICD-10-CM

## 2025-01-10 DIAGNOSIS — K74.60 CIRRHOSIS: ICD-10-CM

## 2025-01-10 DIAGNOSIS — R74.8 ABNORMAL LIVER ENZYMES: ICD-10-CM

## 2025-01-10 DIAGNOSIS — Z79.899 HIGH RISK MEDICATION USE: ICD-10-CM

## 2025-01-10 DIAGNOSIS — K31.89 INTESTINAL METAPLASIA OF GASTRIC CARDIA: ICD-10-CM

## 2025-01-10 DIAGNOSIS — Z12.11 SCREEN FOR COLON CANCER: ICD-10-CM

## 2025-01-10 DIAGNOSIS — I10 HYPERTENSION: ICD-10-CM

## 2025-01-10 DIAGNOSIS — D69.6 THROMBOCYTOPENIA: ICD-10-CM

## 2025-01-10 DIAGNOSIS — K76.0 FATTY LIVER: ICD-10-CM

## 2025-01-10 DIAGNOSIS — Z71.89 VACCINE COUNSELING: ICD-10-CM

## 2025-01-10 DIAGNOSIS — Z87.09 HX OF INTRINSIC ASTHMA: ICD-10-CM

## 2025-01-10 DIAGNOSIS — E11.9 DIABETES: ICD-10-CM

## 2025-01-10 DIAGNOSIS — E78.5 HYPERLIPIDEMIA: ICD-10-CM

## 2025-01-10 DIAGNOSIS — C91.10 CLL (CHRONIC LYMPHOCYTIC LEUKEMIA): ICD-10-CM

## 2025-01-10 DIAGNOSIS — E55.9 VITAMIN D DEFICIENCY: ICD-10-CM

## 2025-01-10 DIAGNOSIS — K74.69 OTHER CIRRHOSIS OF LIVER: ICD-10-CM

## 2025-01-10 DIAGNOSIS — K57.90 DIVERTICULOSIS: ICD-10-CM

## 2025-01-10 DIAGNOSIS — R93.2 ABNORMAL GALLBLADDER ULTRASOUND: ICD-10-CM

## 2025-01-10 DIAGNOSIS — R93.3 ABNORMAL CT SCAN, STOMACH: ICD-10-CM

## 2025-01-10 DIAGNOSIS — R18.8 ASCITES: ICD-10-CM

## 2025-01-10 PROBLEM — 19943007: Status: ACTIVE | Noted: 2025-01-10

## 2025-01-10 PROCEDURE — 98007 SYNCH AUDIO-VIDEO EST HI 40: CPT

## 2025-01-10 PROCEDURE — 99215 OFFICE O/P EST HI 40 MIN: CPT

## 2025-01-10 RX ORDER — SYRINGE-NEEDLE,INSULIN,0.5 ML 30 G X1/2"
USE 3 SYRINGES A DAY TO INJECT INSULIN SYRINGE, EMPTY DISPOSABLE MISCELLANEOUS
Qty: 100 | Refills: 0 | Status: ACTIVE | COMMUNITY

## 2025-01-10 RX ORDER — DAPAGLIFLOZIN 10 MG/1
1 TABLET TABLET, FILM COATED ORAL ONCE A DAY
Status: ACTIVE | COMMUNITY

## 2025-01-10 RX ORDER — METOPROLOL TARTRATE 25 MG/1
TAKE 0.5 TABLET BY ORAL ROUTE ONCE DAILY TABLET, FILM COATED ORAL 1
Refills: 0 | Status: ACTIVE | COMMUNITY
Start: 1900-01-01

## 2025-01-10 RX ORDER — ATORVASTATIN CALCIUM 20 MG/1
1 TABLET TABLET, FILM COATED ORAL ONCE A DAY
Status: ACTIVE | COMMUNITY

## 2025-01-10 RX ORDER — CALCIUM CARBONATE 500(1250)
2 TABLETS TABLET ORAL ONCE A DAY
Status: ACTIVE | COMMUNITY

## 2025-01-10 RX ORDER — ASPIRIN 81 MG/1
1 TABLET TABLET, COATED ORAL ONCE A DAY
Status: ACTIVE | COMMUNITY

## 2025-01-10 RX ORDER — INSULIN LISPRO 100 [IU]/ML
(BG-100)/40 = #UNITS FOR BLOOG GLUCOSE GREATER THAN 180 SUBCUTANEOUSLY BEFORE BREAKFAST, LUNCH, AND DINNER INJECTION, SOLUTION INTRAVENOUS; SUBCUTANEOUS
Qty: 10 | Refills: 0 | Status: ACTIVE | COMMUNITY

## 2025-01-10 NOTE — HPI-TODAY'S VISIT:
Pt is a 74 year male, after a previous visit on Oct 2024 for an evaluation for elevated liver enzymes and and fatty liver and cirrhosis and CLL and hep b core pos.  A copy of this document will be sent to the referring provider.     January 7 ultrasound shows liver echogenicity appears coarsened and increased with no lesions. Portal vein was patent/open as expected. Gallstone seen in gallbladder but no size given this time. Gallbladder wall thickened 6 mm. Common bile duct was 4 mm. Small amount of ascites was seen so important that you stay on a low-salt diet. Pancreas were visualized appeared normal. Head and tail not well-seen. Right kidney measured 11.8 cm with no hydronephrosis. Spleen enlarged at 16.2 cm. In summary, coarsened echotexture seen consistent with your history of cirrhosis and no focal lesions. Small gallstone seen with thickened gallbladder wall at 6 mm. Small ascites. If clinical symptoms of concern consider HIDA was recommended. Splenomegaly was seen.  As you recall you had am mri scan done in October 2 2024 at [facility] for your oncologist/hematologist. The liver was cirrhotic and nodular then with some signs of fibrosis seen as well. They did mention you had some perfusional changes which typically they did recommend to be assessed intermittently. No suspicious lesions seen. The gallbladder had a single gallstone with diffuse submucosal edema and wall thickening which they thought could be third spacing of volume overload and no bile duct dilation. Spleen was 16.5 cm and which was very similar and previously had been 14.5 cm. Back in October of last year your ultrasound showed gallbladder wall thickening at 6 mm and a 6 mm gallstone as well.Small volume ascites has been seen prior also.  Patient saw oncology ANAYELI SPO on December 23, 2024: Chronic Lymphocytic Leukemia (CLL) Recent MRI showed significant increase in the size of numerous retroperitoneal lymph nodes above and below the diaphragm with increase in the degree of splenomegaly concerning for worsening lymphoproliferative process. No current CLL therapy. Patient reports feeling of fullness in abdomen and difficulty breathing when sitting. -Expedite appointment with hematologist (Dr. Haywood) to discuss potential need for CLL therapy.  Labs December 20 show ferritin level low at 12 sodium 143 potassium 4.0 chloride 108 CO2 23 calcium 8.9 BUN of 13 creat 0.93 glucose 88 total protein 6.7 albumin 3.7 alk phos elevated at 108 AST 55 ALT 24 bilirubin elevated at 1.3 iron sat high at 64% B12 310 WBC 4.6 hemoglobin 13.3 MCV 88.5 platelet count low at 49 neutrophils low at 0.54 lymphocytes 3.11 monocytes 0.48 eosinophils up at 0.41 basophil 0.05 immature granulocytes 0.02.  Patient has appointment January 23, 2025 with Dr. Calderón.  Oct 2 2024 FINDINGS: Lower Thorax: Normal. Liver: Iron deposition. Liver has a cirrhotic morphology, with a nodular contour, relative hypertrophy of the left lobe, and T2 hyperintense fine reticulations compatible with fibrosis. More conspicuous ill-defined arterial enhancing foci for example  (9:24, 34, 35) without correlate on additional sequences, likely perfusional. No suspicious liver lesions. Conventional hepatic arterial anatomy. Gallbladder/Biliary Tree: Single gallstone with diffuse submucosal edema and wall thickening could be secondary to third spacing/volume overload. No biliary ductal dilatation. Spleen: Splenomegaly measuring 16.5 cm in the oblique craniocaudal dimension, previously 14.5 cm, when measured similarly. Similar nonenhancing punctate foci. Pancreas: Intrinsic T1 hyperintensity is slightly reduced. No main ductal dilatation.  Adrenal Glands: Normal Kidneys/Ureters: Small renal cysts including renal sinus cysts. No hydronephrosis. Symmetrical enhancement. Gastrointestinal: No bowel obstruction. Stool throughout the visualized colon. Lymph Nodes: Interval increased peripancreatic, periportal, and retroperitoneal lymph nodes, for example aortocaval lymph node conglomerate measures approximately 5.1 x 3.2 cm (4:27), previously 2.9 x 1.3 cm. Single aortocaval lymph node measures 1.6 x 3.3 cm (Series 9, Image 71), previously 1.4 x 2.9 cm Peripancreatic lymph node adjacent to the tail measures 2.8 x 1.7 cm (20:20), previously 2 x 1.1 cm. Partially visualized enlarged cardiophrenic (measuring 9 mm) and axillary lymph nodes measuring up to 2.3 cm in the right axilla, for example. Vessels: Normal caliber abdominal aorta. Encased IVC is compressed by the surrounding daniel enlargement in the retroperitoneum without hemodynamically significant stenosis. Similar encasement of the left renal vein without stenosis. Patent portal vein.  Perigastric and perisplenic varices. Peritoneum/Retroperitoneum: Mild ascites. Mesenteric edema.  Bones/Soft Tissues: Susceptibility artifact from L4-L5 spinal fixation hardware and mild degenerative changes. No suspicious enhancing osseous lesions.   IMPRESSION: 1. No suspicious hepatic lesions. Punctate arterially enhancing observations without correlate on additional sequences, favored to represent perfusion anomalies (LR-2).  2. Morphologic features of cirrhosis with stigmata of portal hypertension including mild ascites, upper abdominal varices and splenomegaly. 3. Significant increase in the size of numerous retroperitoneal lymph nodes above and below the diaphragm with increase in the degree of splenomegaly concerning for worsening lymphoproliferative process.  Asked what her plan is left the practice and he is to see a new doctor there for his this week to go over scan.  October 1: Ultrasound showed visualized pancreas portions appeared unremarkable. Liver coarsening and its echotexture and with no suspicious liver lesion. Common bile duct was 4 mm. Gallbladder wall thickening measuring 6 mm was seen which may be due to its contracted state and underlying chronic liver disease. There was a 6 mm gallstone that was seen. No gallbladder distention or pericholecystic fluid seen however. You did have small volume ascites that was seen and important that you go or stay on a low-salt diet to keep this under control. Right kidney 13.1 cm with no hydronephrosis. Liver vessels patent and splenic vessels patent as well. Spleen was enlarged at 16 cm. Overall, they felt that you had a cirrhotic liver with patent vessels and some nonspecific gallbladder wall thickening that could be related due to cirrhosis or portal hypertension. Gallstone was seen. Splenomegaly was seen. Some small volume ascites was seen.  It does appear that the ascites has gone from trace ascites in April now to small volume. We may need to talk about starting you on a diuretic (water pill) after we assess your abdomen to try to help with his fluid retention issue. Please try to be stricter on your salt content of your diet to help with these issues and hopefully try to avoid this.  Pt says he is on low salt diet.   Has not done the follow up the egd aug 2024 and has not done yet. So reminded pt to do this.  May for 2024 EGD showed grade 2 esophageal varices that were medium size in the lower third of the esophagus and 5 bands were placed.  He had complete eradication resulting in deflation of varices.  No bleeding seen.  Mild diffuse portal hypertensive gastropathy seen in the gastric body and duodenum normal.  He recommended a 3-month repeat which would have been for August but I do not see that that was done.  Last visit not on any tx yet for the CLL. Discussed if has any tx to start may need hep b suppressive tx as b core positive  Sept 20 2024 he did for Dr Anjel: folate 13.2 and na 139 and k 4.4 and cl 107 and co2 25 and ca 8.7 and bun 17 and cr 0.89 amd glu 109 and tp 6.8 and alb 3.6 and alk 115 and ast 57 and alt 28 and tb 1.1 and iron sat 9% and low and needs iron (says he was given iron iv for this x 2).  b12 265 and  wbc 8.5 and hg 11.5 little low and hct 39.4 and platelets 45.  neutrophils 2.05 and lymphs 5.22 elevated.   April 12 labs showed glucose elevated at 137 and previously was elevated at 126. Please share with primary provider. He was fasting and he will look at with primary. Bun of 18 creatinine 0.84 sodium 140 potassium 4.9 calcium 9.0 albumin 4.3 bilirubin 0.8 alk phos 120 with AST 62 up from 52 last time and ALT 29. Unfortunately since June of last year your AST went from 33-52-62. Are you on a new medicine? Have you been ill recently? No new reason. WBC 7.3 which is stable for you hemoglobin a little lower at 11.6 from 12.7 in January. MCV normal at 79 but still just at the lower limit of normal. Platelets 56,000 and previously 58,000. Neutrophils normal at 1.9 the lymphocytes a little up at 4.1. INR normal at 1.1.  April 9 ultrasound shows pancreas not well-seen due to gas and body habitus and very limited assessment unremarkable. The liver continues to be coarsened echotexture with no lesions. Liver vessels were patent as expected. Mild gallbladder wall thickening seen at 6 mm but no pericholecystic fluid and an echogenic gallstone was seen.  They suspect that this could be related due to chronic liver disease and less likely due to gallbladder inflammation.  They recommended for us to correlate with clinical history such as if you have symptoms or not of any pain. Common bile duct was normal at 4 mm. Right kidney 12.4 cm with no hydronephrosis. Spleen is mildly enlarged at 14.7 cm and you have trace ascites in the perisplenic space.  Very important that you maintain a low-salt diet with this history. Splenic vessels were patent as expected. In summary, the liver is coarsened echotexture but with no lesions and consistent with chronic liver disease/cirrhosis etiology.  Nonspecific gallbladder wall thickening that could be related due to chronic liver disease or less likely due to gallbladder issues.  Please let us know if you having any symptoms.   Doug 3 labs show sugar up at 126 and possibly not fasting this day? BUN of 13 and creatinine 0.78 both normal. Sodium 141 potassium 4.1 albumin 4.5 bilirubin 0.9 alk phos 96 with AST up a little at 52 and ALT of 34 and previously AST 33 and ALT 22. Sometimes patients with fatty liver near the holidays go up a little underweight or become less active and that can usually cause this to worsen in the winter months. WBC 7.3 hemoglobin a little low but higher at 12.7 from 11.5 previously. MCV was low normal at 79. The trend is getting a little lower which makes me worry if you are getting close to being iron deficient? Platelet count was low at 58 from previous 66. Neutrophils normal at 1.9 but lymphocytes up at 4.4. Previously they have been 2.9 and prior to that 3.1. INR 1.2 which is normal range still. Meld low at 8 and meld na 8.   Dec 28 2023 : partially visualized pancreas unremarkable. Coarsened echotexture of the liver parenchyma with surface nodularity and compatible with cirrhosis. No focal liver mass. Mild thickening of the gallbladder wall without pericholecystic fluid and similar to prior study. Some gallstones seen up to 6mm. Common bile duct 4mm and normal. Right kidney 12.4cm and no hydronephrosis. Spleen 14.8cm. Trace perisplenic ascites seen. Need you to stay on low salt diet with this issue. Doppler vessels patent. In summary, cirrhosis and portal hypertension changes seen in the form of splenomegaly and trace ascites. No focal mass.  Gallstones seen without acute cholecystitits and apparent mild gallbaldder wall thickening and no pericholecystic fluid favoring due to chronic liver disease.  Doppler vessels patent but hepatic aretery slightly decreased in resistive index and nonspecific.  Need liver labs to correlate with this.  Pt says he is on low salt diet. [place] near [place] so we can do the u.s in 3m and do visit to review that.  June 9 labs show glucose of 84 which is normal this time. Previously had been elevated at 118 on a comparison labs that they gave us from 2 years ago. Please share with primary provider to be aware of. BUN of 14 creatinine slightly low at 0.71 sodium 140 potassium 4.1. Chloride slightly up at 107 and may reflect hydrational state that day. Calcium 8.7 albumin 4.0 bilirubin 0.7 alk phos 87 with an AST of 33 and ALT of 22 which appeared to be lower than the comparison labs that they gave on the current sample but again from 2 years ago. Ideal ALT is less than 35. WBC 5.7 hemoglobin was low though at 11.5 hematocrit low at 35.5 with a previous comparison values from 2 years ago being normal. Platelet count was slightly higher now at 66 from previous comparison level of 63. MCV normal at 83 and neutrophils 2.1 and lymphocytes 2.9. INR normal at 1.2. From the note, we saw some more recent labs that you did with Dr. Hobbs that it showed that your hemoglobin likewise was higher at 14.7 and it is clearly lower now platelets were 57,000 then. Liver labs were AST of 36 and ALT of 25 so those are very close to these.  The main difference appears to be that your hemoglobin is a little lower and I would asked that you follow-up with Dr. Jessica Hobbs your hematologist regarding this issue.  Dr Hobbs to see May and told all is ok.  May 30 2023 ultrasound shows liver is mildly increased/coarsened in echotexture but with no suspicious lesions. Common bile duct was normal at 3.1 mm. A likely small 6 mm calculus/stone was seen in the gallbladder.  They felt that the gallbladder wall measurement of 4.2 mm was a technical error estimation.  No pericholecystic fluid was seen. Pancreatic tail was not seen due to gas and that the image pancreas was otherwise grossly unremarkable. Right kidney was 12.3 cm with no stones or hydronephrosis.  Spleen was enlarged at 14 cm with no splenic lesions. No ascites was seen. Liver vessels were patent as expected. Splenic vessels were patent. In summary, they felt that you had an increase/coarsened hepatic echotexture in keeping with your history of fatty liver and early chronic parenchymal liver disease.  No suspicious lesions were seen.  Probable 6 mm gallstone seen with no evidence of acute cholecystitis that they could clearly see.  Splenomegaly was seen as well. Of note, on your previous ultrasound last year you had a 6 mm stone also seen in the gallbladder and your spleen measured 14.2 cm then.  Last labs he did from Dr Hobbs were 2m ago.  2023 labs done by Dr. Jessica Hobbs show AFP normal at 2.6.   sodium 137 potassium 3.7 chloride 105 CO2 25 BUN 17 creatinine 0.73 glucose 230 albumin 4.2 alk phos 84 AST 36 ALT 25 bilirubin 1.2 elevated folate 16.9 and ferritin 37 B12 447 iron sat 19% WBC 5.2 hemoglobin 14.7 plate count 57 MCV 83.6  He sees her in 3m and so she will see soon and do the full labs then,  Jan 2023 ct  Narrative & Impression EXAM: CT ANGIO CHEST W IV CONTRAST, CT ANGIO ABDOMEN PELVIS W IV CONTRAST   CLINICAL INDICATION: TAVR/progress trial.   TECHNIQUE: Non-ECG-gated CT images of the chest, abdomen and pelvis were obtained with nonionic intravenous contrast according to CT angiogram protocol.  Advanced offline 3D post-processing was performed utilizing a dedicated 3D workstation.  There were no immediate complications reported. If applicable, point-of-care testing?was approved following departmental protocol.   COMPARISON: 1/28/2019.   FINDINGS:   VASCULAR: 3 leaflet aortic valve however, there is leaflet thickening and calcification and appearances are compatible with at least moderate aortic stenosis. Normal caliber aortic root. No coronary artery anomaly. Moderately severe coronary artery calcification. Normal caliber thoracic aorta with no significant atherosclerotic calcification or atheromatous plaque. No significant tortuosity. Coronary artery bypass grafting with left internal mammary and at least one saphenous vein bypass grafts. 3 vessel aortic arch. The branch vessels are normal caliber patent. Normal caliber abdominal aorta with mild gross atherosclerotic calcification. No significant plaque. No significant tortuosity. Patent celiac axis, SMA, bilateral renal arteries and accessory right renal artery and GERALDO. Normal caliber iliac and common femoral arteries with moderate atherosclerotic calcification. Focal calcified plaque with in the mid right external iliac artery with a luminal diameter minimally measuring 7 mm. Calcified plaque within the left common iliac artery. The luminal diameter minimally measures 6 mm.   CHEST: The lower neck and thoracic inlet are unremarkable.   Heart size is within normal limits. Normal caliber central pulmonary arteries. Small patent foramen ovale. Minor mitral valve annular calcification. No pericardial effusion or thickening.   There are a few subcentimeter lymph nodes. There are borderline enlarged axillary lymph nodes with the largest axillary lymph node on the right measuring 18 mm (in maximum short axis diameter) and on the left 12 mm. Lymph node enlargement is new compared to the prior study. Appearances may represent reactive lymph nodes. Is there a history of lymphoma proliferative disorder such as CLL/SLL?   The central airways are patent. No new or growing nodules calcified old granulomatous disease. No pleural effusion or thickening.   ABDOMEN/PELVIS: No focal abnormality within the liver. No biliary duct dilatation. Tiny calcified calculus within the gallbladder. No cholecystitis. Splenomegaly. The spleen measures 15 cm, 12 cm on the previous study.   The adrenal glands are unremarkable. Normal size kidneys with good cortical thickness and symmetric enhancement.   The pancreas is unremarkable. Minor diverticulosis without diverticulitis. The bowel and mesentery are otherwise on remarkable.   Unremarkable urinary bladder. Normal size prostate.   No free fluid. No lymph node enlargement.   BONES/SOFT TISSUES: Sternotomy wires. Degenerative change. Osteophytes. Screws within the lumbar spine. No aggressive osseous lesion.     IMPRESSION: 3 leaflet aortic valve however, there is leaflet thickening and calcification and appearances are compatible with at least moderate aortic stenosis.    Moderately severe coronary artery calcification. Coronary artery bypass grafting with left internal mammary and at least one saphenous vein bypass grafts.   Normal caliber thoracic aorta with no significant atherosclerotic calcification or atheromatous plaque. No significant tortuosity.   Normal caliber abdominal aorta with mild gross atherosclerotic calcification. No significant plaque. No significant tortuosity.   Normal caliber iliac and common femoral arteries with moderate atherosclerotic calcification. Focal calcified plaque within the mid right external iliac artery with a luminal diameter minimally measuring 7 mm. Calcified plaque within the left common iliac artery. The luminal diameter minimally measures 6 mm.   New bilateral axillary lymph node enlargement is an interval enlargement of the spleen. Appearances are compatible with a lymphoproliferative disorder, specifically CLL/SLL.    Jan 2023 Dr Sesay visit: Hi risk for SAVR due to age, comorbidity, redo Agree with evaluation for Progress moderate AS trial  Lio Sesay MD  He says since then June 13 and maybe doing valve surgery then.  March 20 egd                                                                              Findings:      Grade II varices were found in the middle third of the esophagus and in       the lower third of the esophagus. They were medium in size. Six bands       were successfully placed with complete eradication, resulting in       deflation of varices. There was no bleeding during and at the end of the       procedure. Estimated blood loss was minimal.      The stomach was normal.      The examined duodenum was normal.                                                                                 Impression:            - Grade II esophageal varices. Completely eradicated.                         Banded.                        - Normal stomach.                        - Normal examined duodenum.                        - No specimens collected. Recommendation:        - Perform a colonoscopy today.                        - Repeat upper endoscopy in 3 months for retreatment.   He does the scopes with Dr Nabor radford for the follow up.  March 20 colon: Findings:      The perianal and digital rectal examinations were normal.      The terminal ileum appeared normal.      The colon (entire examined portion) appeared normal.      Non-bleeding internal hemorrhoids were found during retroflexion. The       hemorrhoids were mild.                                                                                 Impression:            - The examined portion of the ileum was normal.                        - The entire examined colon is normal.                        - Non-bleeding internal hemorrhoids.                        - No specimens collected. Recommendation:        - Discharge patient to home (via wheelchair).                        - Repeat colonoscopy in 5 years for surveillance.                        - Return to GI office PRN.  He was last seen on February 15, 2023 by Jyoti Diehl PA-C to talk about EGD surveillance.  Last EGD had been in 2021 and it showed grade 1 varices in patchy mildly erythematous mucosa without bleeding in the gastric antrum.  EGD and colonoscopy were top be set up but apparently they were set up for March 27 the patient called on February 17 to do it sooner elsewhere.  October 2022: [facility] labs sent to me. Sodium was 138, potassium 3.9 chloride 105 CO2 23 BUN of 14 creatinine 0.66 calcium 9.2 glucose elevated at 112.  Albumin 4.3 alkaline phosphatase 84 AST 37 ALT 31 with ideal ALT less than 35.  Bilirubin elevated 1.4 but not fractionated.  INR normal at 1.19.  Neutrophils 2.39 lymphocytes 3.13. White blood cell count 6.5 hemoglobin elevated 16.2 hematocrit elevated 50.4 platelet count 60,000.  MCV 85.3 please share with primary provider. February labs showed hemoglobin elevated at 16.4 so very similar to this and now slightly lower.  AST was 38 and ALT 37 last time.  Please share labs with primary provider. Meld 10 and meld na 10 so remaining low.  Oct 25: u.s: pancreas normal in size. Liver is heterogeneous in echogenicity. No definite focal hepatic abnormality. Normal doppler flow. Vessels patent. Gallbladder showed a 6mm stone in the neck of the gallbladder. Common bile duct 3-4 mm and right kidney 12cm. Spleen 14.2cm enlarged. Vessels patent. Overall, heterogeneous liver. 6mm stone seen in the neck of gallbladder. Splenomegaly. Patent vessels. On the prior u.s 4mm stone size was mentioned.  Mri: april 11 2022: liver with chronic liver disease and lobar distribution and nodular contour. No hcc. Vessel patent. varices near esoph and stomach and spleen. spleen 14.5cm. Pancreas normal. Enalrged ln seen and mildly inc. Comaptible with CLL.   April 22 ultrasound shows the liver to have a cirrhotic morphology.  It appears to be heterogeneous in echotexture with mild lobulation of the contour. Common bile duct measured 4 mm. There was an echogenic focus in the dependent portion of the gallbladder without shadowing that they felt could be a small stone. No ascites was seen. Pancreas were visualized appeared unremarkable. Right kidney measured 11.5 cm and had normal echogenicity and no hydronephrosis. Liver vessels were patent. Overall they felt that you had a cirrhotic appearing liver with patent vessels and mild splenomegaly.  Gallstones seen but without mention of inflammation. We plan to redo this in 6 months  Hematology is watching him for now with Dr Hobbs and he is to see her next month.  feb 11 2022 na 138 and k 4 and cl 104 and co2 27 and rosa 16 and cr 0.82 and alb 4.4 and tb 1.1 and alk 77 and ast 38 and alt 37 and ldh 228. wbc 7.4 hg 16.4 plat 59. No inr.  Reports continued better control with DM since starting Farxiga 10mg    A1c running at approximately still at 6-7% per pt and this is unchanged in 2023. Does not require regular insulin just prn  and off the metformin.    Weight stable and he is weighing 147 at home and 152 here and prior 155 and at 148.  HLD controlled with lipitor 20 mg.     He does consume alcohol 1-2  drink per year and was encouraged to stop alcohol entirely if possible.  Received COVID Vaccine x 4 (moderna).    Did egd in jan 2021 and grade 1 varices noted.  Per dr. tomlin note recommend repeat in july 2022 and he has not done this. Dr Tomlin and he has to make it.     US 4/27/21- needs repeat in 6 months (Oct 2021).  Denies abdominal distention, weight gain, confusion, GI bleeding, fevers/chills, abdominal pain.    9/22/21- glucose 118 elevated, creatinine 0.79, tbili 0.7, alk phos 116, ast 47 (prev 53), alt 39 (prev 50)- ideal is less than 35, wbc 8.7, rbc 5.95 (please show primary MD), platelets 63 (prev 86), inr 1.1, sodium 144, MELD 7, MELD-Na 7  4/23/21 tb 1.1 alt 49 alp 84 ast 45  April 27 ultrasound shows the liver to be coarsened with mild nodularity but no discrete liver lesion.  Gallstones are seen but without any gallbladder wall thickening or pericholecystic fluid.  Common bile duct is 2.4 mm.  Pancreas appeared normal to them.  Right kidney appeared to be normal in echogenicity with no renal mass.  Spleen was 12.8 cm.  Liver vessels were patent.  Overall the liver appeared to be coarsened with no focal lesion.  Spleen was upper normal.  I wanted to clarify the size of your right kidney and will await that correction.   They corrected the right kidney size to 11.5cm.  RECAP: he did a recent u/s-cirrhosis noted on imaging. no lesions.  past CT * Final Report *  Reason For Exam Splenomegaly; h/o CLL;Splenomegaly;Cirrhosis of liver  REPORT EXAM: CT Abdomen w/ IV Contrast  CLINICAL INDICATION: Splenomegaly; h/o CLL;Splenomegaly;Cirrhosis of liver.  TECHNIQUE: Following administration of non-ionic IV contrast, postcontrast images through the abdomen were obtained. If applicable, point-of-care testing was approved following departmental protocol. [facility].[email or web address]  COMPARISON: 1/28/2019  FINDINGS:  Lower Thorax: The visualized lung bases are clear. Punctate pleural calcification on the left posteriorly. Coronary artery calcification.  Liver: Hepatic steatosis with a nodular surface contour and fissural widening, unchanged.  Gallbladder/Biliary Tree: Cholelithiasis. No biliary ductal dilatation or gallbladder wall thickening.  Spleen: The spleen measures 13.1 cm in length, previously 11.6 cm at the same level.  Pancreas: Unremarkable.  Adrenal Glands: Unremarkable.   Kidneys/Ureters: Symmetric renal enhancement without obstruction.  Gastrointestinal: No bowel obstruction. Mild, nonspecific wall thickening in the gastric antral pyloric region.   Lymph Nodes: Multiple retroperitoneal lymph nodes are slightly larger. One left para-aortic node (5:33) measures 13 mm short axis, previously 9 mm. One aortocaval node (5:36) measures 9 mm short axis, previously 5 mm. Additional nodes are slightly larger.  Vessels: Moderate atherosclerosis.  Peritoneum/Retroperitoneum: No free fluid.  Bones/Soft Tissues: Osteopenia with degenerative and postsurgical changes in the spine.  IMPRESSION:   1. Slight increase in the size of the spleen. 2. Slight increased size of multiple retroperitoneal lymph nodes. 3. Nonspecific thickening of the gastric antral pylorus wall. 4. Hepatic steatosis with a nodular surface contour and fissural widening consistent with chronic liver disease.    9/15/20 na 141 k 4.6 gluc 109 cr 0.85 tb 1.2 alt 36 ast 39 alp 84  Plan: 1. Reminded pt to see Dr Tomlin for the 3m egd that he is way overdue as they banded earluy last year and he wanted it in aug. 2. Pt will see new heme attending Jan 23  re mri and the CLL concerns and he will let us know.  3.They need to be aware hep b core positive and pending what medicine they use he needs hep b tx asap before he starts that.  4. Monitoring nonspecific gb thickening and the ascites. 5. Rtc in 3 m.  Duration of the visit was 43 minutes with 20 minutes of chart prep and looking up [facility] info and loading and outpt labs and scans and 23 minutes for the face to face today  with time spent reviewing their prior and recent records and labs and discussing their current status and future plans for care for the patient.

## 2025-01-10 NOTE — EXAM-PHYSICAL EXAM
Gen: Feels well.  Head: no headaches.  Eyes: no visual changes.  Mouth: no lesions.  Cv: no chest pain.  Lungs: no wheezing or coughing.  Abd: no pain related.  Ext: no reported edema.  Neuro: awakle and responsive and conversive.

## 2025-01-14 ENCOUNTER — TELEPHONE ENCOUNTER (OUTPATIENT)
Dept: URBAN - METROPOLITAN AREA CLINIC 86 | Facility: CLINIC | Age: 75
End: 2025-01-14

## 2025-01-15 ENCOUNTER — TELEPHONE ENCOUNTER (OUTPATIENT)
Dept: URBAN - METROPOLITAN AREA CLINIC 91 | Facility: CLINIC | Age: 75
End: 2025-01-15

## 2025-01-15 ENCOUNTER — TELEPHONE ENCOUNTER (OUTPATIENT)
Dept: URBAN - METROPOLITAN AREA CLINIC 98 | Facility: CLINIC | Age: 75
End: 2025-01-15

## 2025-01-22 ENCOUNTER — LAB OUTSIDE AN ENCOUNTER (OUTPATIENT)
Dept: URBAN - METROPOLITAN AREA TELEHEALTH 2 | Facility: TELEHEALTH | Age: 75
End: 2025-01-22

## 2025-01-22 ENCOUNTER — OFFICE VISIT (OUTPATIENT)
Dept: URBAN - METROPOLITAN AREA TELEHEALTH 2 | Facility: TELEHEALTH | Age: 75
End: 2025-01-22
Payer: COMMERCIAL

## 2025-01-22 VITALS — WEIGHT: 144 LBS | BODY MASS INDEX: 23.99 KG/M2 | HEIGHT: 65 IN

## 2025-01-22 DIAGNOSIS — R18.8 ASCITES: ICD-10-CM

## 2025-01-22 DIAGNOSIS — Z87.09 HX OF INTRINSIC ASTHMA: ICD-10-CM

## 2025-01-22 DIAGNOSIS — E11.9 DIABETES: ICD-10-CM

## 2025-01-22 DIAGNOSIS — R76.8 HEPATITIS B CORE ANTIBODY POSITIVE: ICD-10-CM

## 2025-01-22 DIAGNOSIS — M67.40 GANGLION CYST: ICD-10-CM

## 2025-01-22 DIAGNOSIS — Z79.899 HIGH RISK MEDICATION USE: ICD-10-CM

## 2025-01-22 DIAGNOSIS — C91.10 CLL (CHRONIC LYMPHOCYTIC LEUKEMIA): ICD-10-CM

## 2025-01-22 DIAGNOSIS — I85.00 ESOPHAGEAL VARICES WITHOUT BLEEDING, UNSPECIFIED ESOPHAGEAL VARICES TYPE: ICD-10-CM

## 2025-01-22 DIAGNOSIS — R93.2 ABNORMAL GALLBLADDER ULTRASOUND: ICD-10-CM

## 2025-01-22 DIAGNOSIS — D69.6 THROMBOCYTOPENIA: ICD-10-CM

## 2025-01-22 DIAGNOSIS — K31.89 INTESTINAL METAPLASIA OF GASTRIC CARDIA: ICD-10-CM

## 2025-01-22 DIAGNOSIS — K76.0 FATTY LIVER: ICD-10-CM

## 2025-01-22 DIAGNOSIS — E55.9 VITAMIN D DEFICIENCY: ICD-10-CM

## 2025-01-22 DIAGNOSIS — Z71.89 VACCINE COUNSELING: ICD-10-CM

## 2025-01-22 DIAGNOSIS — K57.90 DIVERTICULOSIS: ICD-10-CM

## 2025-01-22 DIAGNOSIS — I10 HYPERTENSION: ICD-10-CM

## 2025-01-22 DIAGNOSIS — E78.5 HYPERLIPIDEMIA: ICD-10-CM

## 2025-01-22 DIAGNOSIS — R93.3 ABNORMAL CT SCAN, STOMACH: ICD-10-CM

## 2025-01-22 DIAGNOSIS — R74.8 ABNORMAL LIVER ENZYMES: ICD-10-CM

## 2025-01-22 DIAGNOSIS — K74.69 OTHER CIRRHOSIS OF LIVER: ICD-10-CM

## 2025-01-22 DIAGNOSIS — I85.10 SECONDARY ESOPHAGEAL VARICES WITHOUT BLEEDING: ICD-10-CM

## 2025-01-22 DIAGNOSIS — Z12.11 SCREEN FOR COLON CANCER: ICD-10-CM

## 2025-01-22 PROCEDURE — 99215 OFFICE O/P EST HI 40 MIN: CPT

## 2025-01-22 RX ORDER — SYRINGE-NEEDLE,INSULIN,0.5 ML 30 G X1/2"
USE 3 SYRINGES A DAY TO INJECT INSULIN SYRINGE, EMPTY DISPOSABLE MISCELLANEOUS
Qty: 100 | Refills: 0 | Status: ACTIVE | COMMUNITY

## 2025-01-22 RX ORDER — SPIRONOLACTONE 50 MG/1
1 TABLET TABLET, FILM COATED ORAL ONCE A DAY
Qty: 30 | Refills: 0 | OUTPATIENT
Start: 2025-01-22 | End: 2025-02-21

## 2025-01-22 RX ORDER — ATORVASTATIN CALCIUM 20 MG/1
1 TABLET TABLET, FILM COATED ORAL ONCE A DAY
Status: ACTIVE | COMMUNITY

## 2025-01-22 RX ORDER — METOPROLOL TARTRATE 25 MG/1
TAKE 0.5 TABLET BY ORAL ROUTE ONCE DAILY TABLET, FILM COATED ORAL 1
Refills: 0 | Status: ACTIVE | COMMUNITY
Start: 1900-01-01

## 2025-01-22 RX ORDER — FUROSEMIDE 20 MG/1
1 TABLET TABLET ORAL ONCE A DAY
Qty: 30 | Refills: 0 | OUTPATIENT
Start: 2025-01-22 | End: 2025-02-21

## 2025-01-22 RX ORDER — CALCIUM CARBONATE 500(1250)
2 TABLETS TABLET ORAL ONCE A DAY
Status: ACTIVE | COMMUNITY

## 2025-01-22 RX ORDER — ASPIRIN 81 MG/1
1 TABLET TABLET, COATED ORAL ONCE A DAY
Status: ACTIVE | COMMUNITY

## 2025-01-22 RX ORDER — INSULIN LISPRO 100 [IU]/ML
(BG-100)/40 = #UNITS FOR BLOOG GLUCOSE GREATER THAN 180 SUBCUTANEOUSLY BEFORE BREAKFAST, LUNCH, AND DINNER INJECTION, SOLUTION INTRAVENOUS; SUBCUTANEOUS
Qty: 10 | Refills: 0 | Status: ACTIVE | COMMUNITY

## 2025-01-22 RX ORDER — DAPAGLIFLOZIN 10 MG/1
1 TABLET TABLET, FILM COATED ORAL ONCE A DAY
Status: ACTIVE | COMMUNITY

## 2025-01-22 NOTE — HPI-TODAY'S VISIT:
Pt is a 74 year male, after a previous visit on Jan 2025 for an evaluation for elevated liver enzymes and and fatty liver and cirrhosis and CLL and hep b core pos and recent worsening ascites.  A copy of this document will be sent to the referring provider.     He says that he is feeling like more fluid issues since the last visit. Asked if on a low salt diet and he says he is on that.  Asked re weight and he is weighing and 142 and that is 10 pounds more than usual.   Need to start on water pills and we can order.  We will need to follow and follow weights. May yet not need a tap.  Very look to look at salt of food and uses Mrs Duckworth.  January 7 ultrasound shows liver echogenicity appears coarsened and increased with no lesions. Portal vein was patent/open as expected. Gallstone seen in gallbladder but no size given this time. Gallbladder wall thickened 6 mm. Common bile duct was 4 mm. Small amount of ascites was seen so important that you stay on a low-salt diet. Pancreas were visualized appeared normal. Head and tail not well-seen. Right kidney measured 11.8 cm with no hydronephrosis. Spleen enlarged at 16.2 cm. In summary, coarsened echotexture seen consistent with your history of cirrhosis and no focal lesions. Small gallstone seen with thickened gallbladder wall at 6 mm. Small ascites. If clinical symptoms of concern consider HIDA was recommended. Splenomegaly was seen.  Last labs in dec and we would want to do labs when weather allows.  As you recall you had am mri scan done in October 2 2024 at [facility] for your oncologist/hematologist. The liver was cirrhotic and nodular then with some signs of fibrosis seen as well. They did mention you had some perfusional changes which typically they did recommend to be assessed intermittently. No suspicious lesions seen. The gallbladder had a single gallstone with diffuse submucosal edema and wall thickening which they thought could be third spacing of volume overload and no bile duct dilation. Spleen was 16.5 cm and which was very similar and previously had been 14.5 cm. Back in October of last year your ultrasound showed gallbladder wall thickening at 6 mm and a 6 mm gallstone as well.Small volume ascites has been seen prior also.  Patient saw oncology ANAYELI Veeqo on December 23, 2024: Chronic Lymphocytic Leukemia (CLL) Recent MRI showed significant increase in the size of numerous retroperitoneal lymph nodes above and below the diaphragm with increase in the degree of splenomegaly concerning for worsening lymphoproliferative process. No current CLL therapy. Patient reports feeling of fullness in abdomen and difficulty breathing when sitting. -Expedite appointment with hematologist (Dr. Haywood) to discuss potential need for CLL therapy.  Labs December 20 show ferritin level low at 12 sodium 143 potassium 4.0 chloride 108 CO2 23 calcium 8.9 BUN of 13 creat 0.93 glucose 88 total protein 6.7 albumin 3.7 alk phos elevated at 108 AST 55 ALT 24 bilirubin elevated at 1.3 iron sat high at 64% B12 310 WBC 4.6 hemoglobin 13.3 MCV 88.5 platelet count low at 49 neutrophils low at 0.54 lymphocytes 3.11 monocytes 0.48 eosinophils up at 0.41 basophil 0.05 immature granulocytes 0.02.  Patient has appointment January 23, 2025 with Dr. Calderón. To see them tomorrow. He is to do labs in [place].  Oct 2 2024 FINDINGS: Lower Thorax: Normal. Liver: Iron deposition. Liver has a cirrhotic morphology, with a nodular contour, relative hypertrophy of the left lobe, and T2 hyperintense fine reticulations compatible with fibrosis. More conspicuous ill-defined arterial enhancing foci for example  (9:24, 34, 35) without correlate on additional sequences, likely perfusional. No suspicious liver lesions. Conventional hepatic arterial anatomy. Gallbladder/Biliary Tree: Single gallstone with diffuse submucosal edema and wall thickening could be secondary to third spacing/volume overload. No biliary ductal dilatation. Spleen: Splenomegaly measuring 16.5 cm in the oblique craniocaudal dimension, previously 14.5 cm, when measured similarly. Similar nonenhancing punctate foci. Pancreas: Intrinsic T1 hyperintensity is slightly reduced. No main ductal dilatation.  Adrenal Glands: Normal Kidneys/Ureters: Small renal cysts including renal sinus cysts. No hydronephrosis. Symmetrical enhancement. Gastrointestinal: No bowel obstruction. Stool throughout the visualized colon. Lymph Nodes: Interval increased peripancreatic, periportal, and retroperitoneal lymph nodes, for example aortocaval lymph node conglomerate measures approximately 5.1 x 3.2 cm (4:27), previously 2.9 x 1.3 cm. Single aortocaval lymph node measures 1.6 x 3.3 cm (Series 9, Image 71), previously 1.4 x 2.9 cm Peripancreatic lymph node adjacent to the tail measures 2.8 x 1.7 cm (20:20), previously 2 x 1.1 cm. Partially visualized enlarged cardiophrenic (measuring 9 mm) and axillary lymph nodes measuring up to 2.3 cm in the right axilla, for example. Vessels: Normal caliber abdominal aorta. Encased IVC is compressed by the surrounding daniel enlargement in the retroperitoneum without hemodynamically significant stenosis. Similar encasement of the left renal vein without stenosis. Patent portal vein.  Perigastric and perisplenic varices. Peritoneum/Retroperitoneum: Mild ascites. Mesenteric edema.  Bones/Soft Tissues: Susceptibility artifact from L4-L5 spinal fixation hardware and mild degenerative changes. No suspicious enhancing osseous lesions.   IMPRESSION: 1. No suspicious hepatic lesions. Punctate arterially enhancing observations without correlate on additional sequences, favored to represent perfusion anomalies (LR-2).  2. Morphologic features of cirrhosis with stigmata of portal hypertension including mild ascites, upper abdominal varices and splenomegaly. 3. Significant increase in the size of numerous retroperitoneal lymph nodes above and below the diaphragm with increase in the degree of splenomegaly concerning for worsening lymphoproliferative process.  Asked what her plan is left the practice and he is to see a new doctor there for his this week to go over scan.  October 1: Ultrasound showed visualized pancreas portions appeared unremarkable. Liver coarsening and its echotexture and with no suspicious liver lesion. Common bile duct was 4 mm. Gallbladder wall thickening measuring 6 mm was seen which may be due to its contracted state and underlying chronic liver disease. There was a 6 mm gallstone that was seen. No gallbladder distention or pericholecystic fluid seen however. You did have small volume ascites that was seen and important that you go or stay on a low-salt diet to keep this under control. Right kidney 13.1 cm with no hydronephrosis. Liver vessels patent and splenic vessels patent as well. Spleen was enlarged at 16 cm. Overall, they felt that you had a cirrhotic liver with patent vessels and some nonspecific gallbladder wall thickening that could be related due to cirrhosis or portal hypertension. Gallstone was seen. Splenomegaly was seen. Some small volume ascites was seen.  It does appear that the ascites has gone from trace ascites in April now to small volume. We may need to talk about starting you on a diuretic (water pill) after we assess your abdomen to try to help with his fluid retention issue. Please try to be stricter on your salt content of your diet to help with these issues and hopefully try to avoid this.  Pt says he is on low salt diet.   Has not done the follow up the egd aug 2024 and has not done yet. So reminded pt to do this.  May for 2024 EGD showed grade 2 esophageal varices that were medium size in the lower third of the esophagus and 5 bands were placed.  He had complete eradication resulting in deflation of varices.  No bleeding seen.  Mild diffuse portal hypertensive gastropathy seen in the gastric body and duodenum normal.  He recommended a 3-month repeat which would have been for August but I do not see that that was done.  Last visit not on any tx yet for the CLL. Discussed if has any tx to start may need hep b suppressive tx as b core positive  Sept 20 2024 he did for Dr Anjel: folate 13.2 and na 139 and k 4.4 and cl 107 and co2 25 and ca 8.7 and bun 17 and cr 0.89 amd glu 109 and tp 6.8 and alb 3.6 and alk 115 and ast 57 and alt 28 and tb 1.1 and iron sat 9% and low and needs iron (says he was given iron iv for this x 2).  b12 265 and  wbc 8.5 and hg 11.5 little low and hct 39.4 and platelets 45.  neutrophils 2.05 and lymphs 5.22 elevated.   April 12 labs showed glucose elevated at 137 and previously was elevated at 126. Please share with primary provider. He was fasting and he will look at with primary. Bun of 18 creatinine 0.84 sodium 140 potassium 4.9 calcium 9.0 albumin 4.3 bilirubin 0.8 alk phos 120 with AST 62 up from 52 last time and ALT 29. Unfortunately since June of last year your AST went from 33-52-62. Are you on a new medicine? Have you been ill recently? No new reason. WBC 7.3 which is stable for you hemoglobin a little lower at 11.6 from 12.7 in January. MCV normal at 79 but still just at the lower limit of normal. Platelets 56,000 and previously 58,000. Neutrophils normal at 1.9 the lymphocytes a little up at 4.1. INR normal at 1.1.  April 9 ultrasound shows pancreas not well-seen due to gas and body habitus and very limited assessment unremarkable. The liver continues to be coarsened echotexture with no lesions. Liver vessels were patent as expected. Mild gallbladder wall thickening seen at 6 mm but no pericholecystic fluid and an echogenic gallstone was seen.  They suspect that this could be related due to chronic liver disease and less likely due to gallbladder inflammation.  They recommended for us to correlate with clinical history such as if you have symptoms or not of any pain. Common bile duct was normal at 4 mm. Right kidney 12.4 cm with no hydronephrosis. Spleen is mildly enlarged at 14.7 cm and you have trace ascites in the perisplenic space.  Very important that you maintain a low-salt diet with this history. Splenic vessels were patent as expected. In summary, the liver is coarsened echotexture but with no lesions and consistent with chronic liver disease/cirrhosis etiology.  Nonspecific gallbladder wall thickening that could be related due to chronic liver disease or less likely due to gallbladder issues.  Please let us know if you having any symptoms.   Doug 3 labs show sugar up at 126 and possibly not fasting this day? BUN of 13 and creatinine 0.78 both normal. Sodium 141 potassium 4.1 albumin 4.5 bilirubin 0.9 alk phos 96 with AST up a little at 52 and ALT of 34 and previously AST 33 and ALT 22. Sometimes patients with fatty liver near the holidays go up a little underweight or become less active and that can usually cause this to worsen in the winter months. WBC 7.3 hemoglobin a little low but higher at 12.7 from 11.5 previously. MCV was low normal at 79. The trend is getting a little lower which makes me worry if you are getting close to being iron deficient? Platelet count was low at 58 from previous 66. Neutrophils normal at 1.9 but lymphocytes up at 4.4. Previously they have been 2.9 and prior to that 3.1. INR 1.2 which is normal range still. Meld low at 8 and meld na 8.   Dec 28 2023 : partially visualized pancreas unremarkable. Coarsened echotexture of the liver parenchyma with surface nodularity and compatible with cirrhosis. No focal liver mass. Mild thickening of the gallbladder wall without pericholecystic fluid and similar to prior study. Some gallstones seen up to 6mm. Common bile duct 4mm and normal. Right kidney 12.4cm and no hydronephrosis. Spleen 14.8cm. Trace perisplenic ascites seen. Need you to stay on low salt diet with this issue. Doppler vessels patent. In summary, cirrhosis and portal hypertension changes seen in the form of splenomegaly and trace ascites. No focal mass.  Gallstones seen without acute cholecystitits and apparent mild gallbaldder wall thickening and no pericholecystic fluid favoring due to chronic liver disease.  Doppler vessels patent but hepatic aretery slightly decreased in resistive index and nonspecific.  Need liver labs to correlate with this.  Pt says he is on low salt diet. He [place] near [place] so we can do the u.s in [place] and do visit to review that.  June 9 labs show glucose of 84 which is normal this time. Previously had been elevated at 118 on a comparison labs that they gave us from 2 years ago. Please share with primary provider to be aware of. BUN of 14 creatinine slightly low at 0.71 sodium 140 potassium 4.1. Chloride slightly up at 107 and may reflect hydrational state that day. Calcium 8.7 albumin 4.0 bilirubin 0.7 alk phos 87 with an AST of 33 and ALT of 22 which appeared to be lower than the comparison labs that they gave on the current sample but again from 2 years ago. Ideal ALT is less than 35. WBC 5.7 hemoglobin was low though at 11.5 hematocrit low at 35.5 with a previous comparison values from 2 years ago being normal. Platelet count was slightly higher now at 66 from previous comparison level of 63. MCV normal at 83 and neutrophils 2.1 and lymphocytes 2.9. INR normal at 1.2. From the note, we saw some more recent labs that you did with Dr. Hobbs that it showed that your hemoglobin likewise was higher at 14.7 and it is clearly lower now platelets were 57,000 then. Liver labs were AST of 36 and ALT of 25 so those are very close to these.  The main difference appears to be that your hemoglobin is a little lower and I would asked that you follow-up with Dr. Jessica Hobbs your hematologist regarding this issue.  Dr Hobbs to see May and told all is ok.  May 30 2023 ultrasound shows liver is mildly increased/coarsened in echotexture but with no suspicious lesions. Common bile duct was normal at 3.1 mm. A likely small 6 mm calculus/stone was seen in the gallbladder.  They felt that the gallbladder wall measurement of 4.2 mm was a technical error estimation.  No pericholecystic fluid was seen. Pancreatic tail was not seen due to gas and that the image pancreas was otherwise grossly unremarkable. Right kidney was 12.3 cm with no stones or hydronephrosis.  Spleen was enlarged at 14 cm with no splenic lesions. No ascites was seen. Liver vessels were patent as expected. Splenic vessels were patent. In summary, they felt that you had an increase/coarsened hepatic echotexture in keeping with your history of fatty liver and early chronic parenchymal liver disease.  No suspicious lesions were seen.  Probable 6 mm gallstone seen with no evidence of acute cholecystitis that they could clearly see.  Splenomegaly was seen as well. Of note, on your previous ultrasound last year you had a 6 mm stone also seen in the gallbladder and your spleen measured 14.2 cm then.  Last labs he did from Dr Hobbs were 2m ago.  2023 labs done by Dr. Jessica Hobbs show AFP normal at 2.6.   sodium 137 potassium 3.7 chloride 105 CO2 25 BUN 17 creatinine 0.73 glucose 230 albumin 4.2 alk phos 84 AST 36 ALT 25 bilirubin 1.2 elevated folate 16.9 and ferritin 37 B12 447 iron sat 19% WBC 5.2 hemoglobin 14.7 plate count 57 MCV 83.6  He sees her in 3m and so she will see soon and do the full labs then,  Jan 2023 ct  Narrative & Impression EXAM: CT ANGIO CHEST W IV CONTRAST, CT ANGIO ABDOMEN PELVIS W IV CONTRAST   CLINICAL INDICATION: TAVR/progress trial.   TECHNIQUE: Non-ECG-gated CT images of the chest, abdomen and pelvis were obtained with nonionic intravenous contrast according to CT angiogram protocol.  Advanced offline 3D post-processing was performed utilizing a dedicated 3D workstation.  There were no immediate complications reported. If applicable, point-of-care testing?was approved following departmental protocol.   COMPARISON: 1/28/2019.   FINDINGS:   VASCULAR: 3 leaflet aortic valve however, there is leaflet thickening and calcification and appearances are compatible with at least moderate aortic stenosis. Normal caliber aortic root. No coronary artery anomaly. Moderately severe coronary artery calcification. Normal caliber thoracic aorta with no significant atherosclerotic calcification or atheromatous plaque. No significant tortuosity. Coronary artery bypass grafting with left internal mammary and at least one saphenous vein bypass grafts. 3 vessel aortic arch. The branch vessels are normal caliber patent. Normal caliber abdominal aorta with mild gross atherosclerotic calcification. No significant plaque. No significant tortuosity. Patent celiac axis, SMA, bilateral renal arteries and accessory right renal artery and GERALDO. Normal caliber iliac and common femoral arteries with moderate atherosclerotic calcification. Focal calcified plaque with in the mid right external iliac artery with a luminal diameter minimally measuring 7 mm. Calcified plaque within the left common iliac artery. The luminal diameter minimally measures 6 mm.   CHEST: The lower neck and thoracic inlet are unremarkable.   Heart size is within normal limits. Normal caliber central pulmonary arteries. Small patent foramen ovale. Minor mitral valve annular calcification. No pericardial effusion or thickening.   There are a few subcentimeter lymph nodes. There are borderline enlarged axillary lymph nodes with the largest axillary lymph node on the right measuring 18 mm (in maximum short axis diameter) and on the left 12 mm. Lymph node enlargement is new compared to the prior study. Appearances may represent reactive lymph nodes. Is there a history of lymphoma proliferative disorder such as CLL/SLL?   The central airways are patent. No new or growing nodules calcified old granulomatous disease. No pleural effusion or thickening.   ABDOMEN/PELVIS: No focal abnormality within the liver. No biliary duct dilatation. Tiny calcified calculus within the gallbladder. No cholecystitis. Splenomegaly. The spleen measures 15 cm, 12 cm on the previous study.   The adrenal glands are unremarkable. Normal size kidneys with good cortical thickness and symmetric enhancement.   The pancreas is unremarkable. Minor diverticulosis without diverticulitis. The bowel and mesentery are otherwise on remarkable.   Unremarkable urinary bladder. Normal size prostate.   No free fluid. No lymph node enlargement.   BONES/SOFT TISSUES: Sternotomy wires. Degenerative change. Osteophytes. Screws within the lumbar spine. No aggressive osseous lesion.     IMPRESSION: 3 leaflet aortic valve however, there is leaflet thickening and calcification and appearances are compatible with at least moderate aortic stenosis.    Moderately severe coronary artery calcification. Coronary artery bypass grafting with left internal mammary and at least one saphenous vein bypass grafts.   Normal caliber thoracic aorta with no significant atherosclerotic calcification or atheromatous plaque. No significant tortuosity.   Normal caliber abdominal aorta with mild gross atherosclerotic calcification. No significant plaque. No significant tortuosity.   Normal caliber iliac and common femoral arteries with moderate atherosclerotic calcification. Focal calcified plaque within the mid right external iliac artery with a luminal diameter minimally measuring 7 mm. Calcified plaque within the left common iliac artery. The luminal diameter minimally measures 6 mm.   New bilateral axillary lymph node enlargement is an interval enlargement of the spleen. Appearances are compatible with a lymphoproliferative disorder, specifically CLL/SLL.    Jan 2023 Dr Sesay visit: Hi risk for SAVR due to age, comorbidity, redo Agree with evaluation for Progress moderate AS trial  Lio Sesay MD  He says since then June 13 and maybe doing valve surgery then.  March 20 egd                                                                              Findings:      Grade II varices were found in the middle third of the esophagus and in       the lower third of the esophagus. They were medium in size. Six bands       were successfully placed with complete eradication, resulting in       deflation of varices. There was no bleeding during and at the end of the       procedure. Estimated blood loss was minimal.      The stomach was normal.      The examined duodenum was normal.                                                                                 Impression:            - Grade II esophageal varices. Completely eradicated.                         Banded.                        - Normal stomach.                        - Normal examined duodenum.                        - No specimens collected. Recommendation:        - Perform a colonoscopy today.                        - Repeat upper endoscopy in 3 months for retreatment.   He does the scopes with Dr Nabor radford for the follow up.  March 20 colon: Findings:      The perianal and digital rectal examinations were normal.      The terminal ileum appeared normal.      The colon (entire examined portion) appeared normal.      Non-bleeding internal hemorrhoids were found during retroflexion. The       hemorrhoids were mild.                                                                                 Impression:            - The examined portion of the ileum was normal.                        - The entire examined colon is normal.                        - Non-bleeding internal hemorrhoids.                        - No specimens collected. Recommendation:        - Discharge patient to home (via wheelchair).                        - Repeat colonoscopy in 5 years for surveillance.                        - Return to GI office PRN.  He was last seen on February 15, 2023 by Jyoti Diehl PA-C to talk about EGD surveillance.  Last EGD had been in 2021 and it showed grade 1 varices in patchy mildly erythematous mucosa without bleeding in the gastric antrum.  EGD and colonoscopy were top be set up but apparently they were set up for March 27 the patient called on February 17 to do it sooner elsewhere.  October 2022: [place] labs sent to me. Sodium was 138, potassium 3.9 chloride 105 CO2 23 BUN of 14 creatinine 0.66 calcium 9.2 glucose elevated at 112.  Albumin 4.3 alkaline phosphatase 84 AST 37 ALT 31 with ideal ALT less than 35.  Bilirubin elevated 1.4 but not fractionated.  INR normal at 1.19.  Neutrophils 2.39 lymphocytes 3.13. White blood cell count 6.5 hemoglobin elevated 16.2 hematocrit elevated 50.4 platelet count 60,000.  MCV 85.3 please share with primary provider. February labs showed hemoglobin elevated at 16.4 so very similar to this and now slightly lower.  AST was 38 and ALT 37 last time.  Please share labs with primary provider. Meld 10 and meld na 10 so remaining low.  Oct 25: u.s: pancreas normal in size. Liver is heterogeneous in echogenicity. No definite focal hepatic abnormality. Normal doppler flow. Vessels patent. Gallbladder showed a 6mm stone in the neck of the gallbladder. Common bile duct 3-4 mm and right kidney 12cm. Spleen 14.2cm enlarged. Vessels patent. Overall, heterogeneous liver. 6mm stone seen in the neck of gallbladder. Splenomegaly. Patent vessels. On the prior u.s 4mm stone size was mentioned.  Mri: april 11 2022: liver with chronic liver disease and lobar distribution and nodular contour. No hcc. Vessel patent. varices near esoph and stomach and spleen. spleen 14.5cm. Pancreas normal. Enalrged ln seen and mildly inc. Comaptible with CLL.   April 22 ultrasound shows the liver to have a cirrhotic morphology.  It appears to be heterogeneous in echotexture with mild lobulation of the contour. Common bile duct measured 4 mm. There was an echogenic focus in the dependent portion of the gallbladder without shadowing that they felt could be a small stone. No ascites was seen. Pancreas were visualized appeared unremarkable. Right kidney measured 11.5 cm and had normal echogenicity and no hydronephrosis. Liver vessels were patent. Overall they felt that you had a cirrhotic appearing liver with patent vessels and mild splenomegaly.  Gallstones seen but without mention of inflammation. We plan to redo this in 6 months  Hematology is watching him for now with Dr Hobbs and he is to see her next month.  feb 11 2022 na 138 and k 4 and cl 104 and co2 27 and rosa 16 and cr 0.82 and alb 4.4 and tb 1.1 and alk 77 and ast 38 and alt 37 and ldh 228. wbc 7.4 hg 16.4 plat 59. No inr.  Reports continued better control with DM since starting Farxiga 10mg    A1c running at approximately still at 6-7% per pt and this is unchanged in 2023. Does not require regular insulin just prn  and off the metformin.    Weight stable and he is weighing 147 at home and 152 here and prior 155 and at 148.  HLD controlled with lipitor 20 mg.     He does consume alcohol 1-2  drink per year and was encouraged to stop alcohol entirely if possible.  Received COVID Vaccine x 4 (moderna).    Did egd in jan 2021 and grade 1 varices noted.  Per dr. tomlni note recommend repeat in july 2022 and he has not done this. Dr Tomlin and he has to make it.     US 4/27/21- needs repeat in 6 months (Oct 2021).  Denies abdominal distention, weight gain, confusion, GI bleeding, fevers/chills, abdominal pain.    9/22/21- glucose 118 elevated, creatinine 0.79, tbili 0.7, alk phos 116, ast 47 (prev 53), alt 39 (prev 50)- ideal is less than 35, wbc 8.7, rbc 5.95 (please show primary MD), platelets 63 (prev 86), inr 1.1, sodium 144, MELD 7, MELD-Na 7  4/23/21 tb 1.1 alt 49 alp 84 ast 45  April 27 ultrasound shows the liver to be coarsened with mild nodularity but no discrete liver lesion.  Gallstones are seen but without any gallbladder wall thickening or pericholecystic fluid.  Common bile duct is 2.4 mm.  Pancreas appeared normal to them.  Right kidney appeared to be normal in echogenicity with no renal mass.  Spleen was 12.8 cm.  Liver vessels were patent.  Overall the liver appeared to be coarsened with no focal lesion.  Spleen was upper normal.  I wanted to clarify the size of your right kidney and will await that correction.   They corrected the right kidney size to 11.5cm.  RECAP: he did a recent u/s-cirrhosis noted on imaging. no lesions.  past CT * Final Report *  Reason For Exam Splenomegaly; h/o CLL;Splenomegaly;Cirrhosis of liver  REPORT EXAM: CT Abdomen w/ IV Contrast  CLINICAL INDICATION: Splenomegaly; h/o CLL;Splenomegaly;Cirrhosis of liver.  TECHNIQUE: Following administration of non-ionic IV contrast, postcontrast images through the abdomen were obtained. If applicable, point-of-care testing was approved following departmental protocol. [facility].[id number]  COMPARISON: 1/28/2019  FINDINGS:  Lower Thorax: The visualized lung bases are clear. Punctate pleural calcification on the left posteriorly. Coronary artery calcification.  Liver: Hepatic steatosis with a nodular surface contour and fissural widening, unchanged.  Gallbladder/Biliary Tree: Cholelithiasis. No biliary ductal dilatation or gallbladder wall thickening.  Spleen: The spleen measures 13.1 cm in length, previously 11.6 cm at the same level.  Pancreas: Unremarkable.  Adrenal Glands: Unremarkable.   Kidneys/Ureters: Symmetric renal enhancement without obstruction.  Gastrointestinal: No bowel obstruction. Mild, nonspecific wall thickening in the gastric antral pyloric region.   Lymph Nodes: Multiple retroperitoneal lymph nodes are slightly larger. One left para-aortic node (5:33) measures 13 mm short axis, previously 9 mm. One aortocaval node (5:36) measures 9 mm short axis, previously 5 mm. Additional nodes are slightly larger.  Vessels: Moderate atherosclerosis.  Peritoneum/Retroperitoneum: No free fluid.  Bones/Soft Tissues: Osteopenia with degenerative and postsurgical changes in the spine.  IMPRESSION:   1. Slight increase in the size of the spleen. 2. Slight increased size of multiple retroperitoneal lymph nodes. 3. Nonspecific thickening of the gastric antral pylorus wall. 4. Hepatic steatosis with a nodular surface contour and fissural widening consistent with chronic liver disease.    9/15/20 na 141 k 4.6 gluc 109 cr 0.85 tb 1.2 alt 36 ast 39 alp 84  Plan: 1. He will do the updated labs tomorrow and he will start low dose lasix and aldactone and he will follow weight and labs and pending how he does he may need a tap to be done. We will order a paracentesis for the pt to do likely next week due to delayed procedures. 2. Reminded pt to see Dr Tomlin for the  egd that he is way overdue as they banded early last year and he wanted it in aug. He has not done this yet. 3. Pt sees new heme attending Jan 23  re mri and the CLL concerns and see what they say. 4.They need to be aware hep b core positive and pending what medicine they use he needs hep b tx asap before he starts that.  5. Monitoring nonspecific gb thickening and the ascites. 6. Rtc in 4 weeks to see and assess fluid.   Duration of the visit was 41 minutes with 10 minutes of chart prep and looking up sosa info and loading and outpt labs and scans and 31 minutes for the dox video today  with time spent reviewing their prior and recent records and labs and discussing their current status and future plans for care for the patient.

## 2025-01-29 ENCOUNTER — TELEPHONE ENCOUNTER (OUTPATIENT)
Dept: URBAN - METROPOLITAN AREA CLINIC 86 | Facility: CLINIC | Age: 75
End: 2025-01-29

## 2025-02-04 ENCOUNTER — TELEPHONE ENCOUNTER (OUTPATIENT)
Dept: URBAN - METROPOLITAN AREA CLINIC 86 | Facility: CLINIC | Age: 75
End: 2025-02-04

## 2025-02-04 NOTE — HPI-TODAY'S VISIT:
Dear Shelia Lima, Feb 3 labs show an INR normal at 1.2 but prothrombin time slightly up at 13.5 seconds. Sugar was slightly elevated to 100 and unclear if you were fasting?  BUN was normal at 22 creatinine 0.91 sodium 140 potassium 4.2 calcium 9.2 albumin 4.2. Bilirubin was 1.3 alk phos 159 AST of 56 and ALT of 26 with ideal ALT less than 35. WBC was 5.3 hemoglobin 14.2 plate count was low at 61 MCV 87 with neutrophils low at 0.9 lymphocytes 3.2. The prior labs that we had on you and were actually before your diuretics when you on December 20 had labs and noted to have a BUN of 13 and a creatinine 0.93 so these are fairly close to what those labs were and still within the normal range so we can follow you on that.  Your sodium had been previously 143 and potassium 4.0 so those remained stable as well. The albumin was 3.7 bilirubin 1.3 AST 55 ALT of 24 so these again very similar.   Please let us know how you are doing as I did see that you require that one paracentesis so far and need toknow how your weight checks are running?  We need you to track your weight so we can see if the diuretics are slowing the ascites down or if they are not we need to try to make an increase in the doses for the diuretics.   We had given you labs for January which were just done as well as labs for February.  I would recommend you try to redo the labs for us again in about 2 weeks in 4 weeks so we can see how you are doing and please let us know how your weights are doing   In addition we did receive a note from your [facility] paracentesis cytology from January 29 where they said that the fluid was sent to look for any malignant cells and no malignant cells were seen.   They felt that despite full stains that they did to try to look for this lymphoma they saw no evidence of any lymphoma in the fluid detected cells.   We will forward a copy of this so you can share with your doctors. Dr. Brady

## 2025-02-05 ENCOUNTER — LAB OUTSIDE AN ENCOUNTER (OUTPATIENT)
Dept: URBAN - METROPOLITAN AREA TELEHEALTH 2 | Facility: TELEHEALTH | Age: 75
End: 2025-02-05

## 2025-02-06 ENCOUNTER — TELEPHONE ENCOUNTER (OUTPATIENT)
Dept: URBAN - METROPOLITAN AREA CLINIC 86 | Facility: CLINIC | Age: 75
End: 2025-02-06

## 2025-02-06 LAB
ALBUMIN: 4.2
ALKALINE PHOSPHATASE: 161
ALT (SGPT): 25
AST (SGOT): 60
BILIRUBIN, TOTAL: 1.1
BUN/CREATININE RATIO: 20
BUN: 18
CALCIUM: 8.8
CARBON DIOXIDE, TOTAL: 23
CHLORIDE: 103
CREATININE: 0.88
EGFR: 91
GLOBULIN, TOTAL: 2.8
GLUCOSE: 105
POTASSIUM: 4.7
PROTEIN, TOTAL: 7
SODIUM: 139

## 2025-02-06 NOTE — HPI-TODAY'S VISIT:
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 Per livertox: In open label clinical trials of acalabrutinib in patients with CLL and mantle cell lymphoma, serum aminotransferase elevations occurred in 19% to 23% of patients during therapy and stella to above 5 times ULN in 2% to 3%. These elevations were transient and resolved spontaneously but occasionally led to early drug discontinuation. Among the 610 patients treated with acalabrutinib in pre-registration trials, there were no instances of clinically apparent liver injury attributed to its use, but there was a single instance of acute liver failure and death due to reactivation of hepatitis B. Similar cases of reactivation have been reported with ibrutinib, another small molecule inhibitor of Bruton's tyrosine kinase. Experience with acalabrutinib has been limited and the frequency of clinically apparent liver injury and reactivation of hepatitis B are not known. The majority of cases have occurred in patients taking multiple immunosuppressive agents and not just acalabrutinib alone. Likelihood score: D (possible rare cause of reactivation of hepatitis B). He would likely agree to preemptive hep b tx to prevent reactivation if you felt it was needed and also recommended it for him. Please advise as he was supposed to let me know when starting med and has not mentioned anything so far. I will bring him in if he agrees to get him started on samples of tenofovir alefenomide. Thanks Herson Brady MD ===View-only below this line=== - - - Message - - - From: Yaa Calderón MD Sent: 2/5/2025   1:16 PM EST To: Herson Brady MD Thanks I am starting him on Acalabrutinib for CLL with the progressive lymphoadenopathy.

## 2025-02-06 NOTE — HPI-TODAY'S VISIT:
Dear Shelia Lima, February 5 labs show us that your glucose was 105 slightly up and unclear if you were fasting?  BUN of 18 creatinine 0.88 sodium 139 potassium 4.7 calcium 8.8 albumin 4.2 bilirubin 1.1 which was a little lower.  Alk phos slightly higher at 161 from 159.  AST slightly higher at 60 from 56 and ALT slightly lower at 25 from 26.  These labs are only done 2 days after the other set of labs. Please redo labs in 2 weeks from now so we can see how your trend is and let us know how your weight is doing to know if the diuretics need to be adjusted higher or not.  It does appear that you are tolerating the current dose of water pills well so there is some room for us to work with if needed on the dosing. Dr. Brady

## 2025-02-12 ENCOUNTER — OFFICE VISIT (OUTPATIENT)
Dept: URBAN - METROPOLITAN AREA CLINIC 86 | Facility: CLINIC | Age: 75
End: 2025-02-12
Payer: COMMERCIAL

## 2025-02-12 VITALS
TEMPERATURE: 96.4 F | WEIGHT: 132 LBS | HEART RATE: 62 BPM | SYSTOLIC BLOOD PRESSURE: 101 MMHG | BODY MASS INDEX: 21.99 KG/M2 | HEIGHT: 65 IN | DIASTOLIC BLOOD PRESSURE: 59 MMHG

## 2025-02-12 DIAGNOSIS — R74.8 ABNORMAL LIVER ENZYMES: ICD-10-CM

## 2025-02-12 DIAGNOSIS — D69.6 THROMBOCYTOPENIA: ICD-10-CM

## 2025-02-12 DIAGNOSIS — K57.90 DIVERTICULOSIS: ICD-10-CM

## 2025-02-12 DIAGNOSIS — Z12.11 SCREEN FOR COLON CANCER: ICD-10-CM

## 2025-02-12 DIAGNOSIS — I85.10 SECONDARY ESOPHAGEAL VARICES WITHOUT BLEEDING: ICD-10-CM

## 2025-02-12 DIAGNOSIS — R93.3 ABNORMAL CT SCAN, STOMACH: ICD-10-CM

## 2025-02-12 DIAGNOSIS — E55.9 VITAMIN D DEFICIENCY: ICD-10-CM

## 2025-02-12 DIAGNOSIS — Z71.89 VACCINE COUNSELING: ICD-10-CM

## 2025-02-12 DIAGNOSIS — C91.10 CLL (CHRONIC LYMPHOCYTIC LEUKEMIA): ICD-10-CM

## 2025-02-12 DIAGNOSIS — E11.9 DIABETES: ICD-10-CM

## 2025-02-12 DIAGNOSIS — E78.5 HYPERLIPIDEMIA: ICD-10-CM

## 2025-02-12 DIAGNOSIS — Z87.09 HX OF INTRINSIC ASTHMA: ICD-10-CM

## 2025-02-12 DIAGNOSIS — R76.8 HEPATITIS B CORE ANTIBODY POSITIVE: ICD-10-CM

## 2025-02-12 DIAGNOSIS — K74.69 OTHER CIRRHOSIS OF LIVER: ICD-10-CM

## 2025-02-12 DIAGNOSIS — Z79.899 HIGH RISK MEDICATION USE: ICD-10-CM

## 2025-02-12 DIAGNOSIS — I10 HYPERTENSION: ICD-10-CM

## 2025-02-12 DIAGNOSIS — R93.2 ABNORMAL GALLBLADDER ULTRASOUND: ICD-10-CM

## 2025-02-12 DIAGNOSIS — I85.00 ESOPHAGEAL VARICES WITHOUT BLEEDING, UNSPECIFIED ESOPHAGEAL VARICES TYPE: ICD-10-CM

## 2025-02-12 DIAGNOSIS — K31.89 INTESTINAL METAPLASIA OF GASTRIC CARDIA: ICD-10-CM

## 2025-02-12 DIAGNOSIS — R18.8 ASCITES: ICD-10-CM

## 2025-02-12 DIAGNOSIS — M67.40 GANGLION CYST: ICD-10-CM

## 2025-02-12 DIAGNOSIS — K76.0 FATTY LIVER: ICD-10-CM

## 2025-02-12 PROCEDURE — 99215 OFFICE O/P EST HI 40 MIN: CPT

## 2025-02-12 RX ORDER — INSULIN LISPRO 100 [IU]/ML
(BG-100)/40 = #UNITS FOR BLOOG GLUCOSE GREATER THAN 180 SUBCUTANEOUSLY BEFORE BREAKFAST, LUNCH, AND DINNER INJECTION, SOLUTION INTRAVENOUS; SUBCUTANEOUS
Qty: 10 | Refills: 0 | Status: ACTIVE | COMMUNITY

## 2025-02-12 RX ORDER — FUROSEMIDE 20 MG/1
1 TABLET TABLET ORAL ONCE A DAY
Qty: 30 | Refills: 0 | OUTPATIENT

## 2025-02-12 RX ORDER — SPIRONOLACTONE 50 MG/1
1 TABLET TABLET, FILM COATED ORAL ONCE A DAY
Qty: 30 | Refills: 0 | OUTPATIENT

## 2025-02-12 RX ORDER — DAPAGLIFLOZIN 10 MG/1
1 TABLET TABLET, FILM COATED ORAL ONCE A DAY
Status: ACTIVE | COMMUNITY

## 2025-02-12 RX ORDER — FUROSEMIDE 20 MG/1
1 TABLET TABLET ORAL ONCE A DAY
Qty: 30 | Refills: 0 | Status: ACTIVE | COMMUNITY
Start: 2025-01-22 | End: 2025-02-21

## 2025-02-12 RX ORDER — ATORVASTATIN CALCIUM 20 MG/1
1 TABLET TABLET, FILM COATED ORAL ONCE A DAY
Status: ACTIVE | COMMUNITY

## 2025-02-12 RX ORDER — METOPROLOL TARTRATE 25 MG/1
TAKE 0.5 TABLET BY ORAL ROUTE ONCE DAILY TABLET, FILM COATED ORAL 1
Refills: 0 | Status: ACTIVE | COMMUNITY
Start: 1900-01-01

## 2025-02-12 RX ORDER — SPIRONOLACTONE 50 MG/1
1 TABLET TABLET, FILM COATED ORAL ONCE A DAY
Qty: 30 | Refills: 0 | Status: ACTIVE | COMMUNITY
Start: 2025-01-22 | End: 2025-02-21

## 2025-02-12 RX ORDER — CALCIUM CARBONATE 500(1250)
2 TABLETS TABLET ORAL ONCE A DAY
Status: ACTIVE | COMMUNITY

## 2025-02-12 RX ORDER — TENOFOVIR ALAFENAMIDE 25 MG/1
1 TABLET WITH FOOD TABLET ORAL ONCE A DAY
Qty: 30 TABLET | Refills: 3 | OUTPATIENT
Start: 2025-02-12 | End: 2025-06-12

## 2025-02-12 RX ORDER — SYRINGE-NEEDLE,INSULIN,0.5 ML 30 G X1/2"
USE 3 SYRINGES A DAY TO INJECT INSULIN SYRINGE, EMPTY DISPOSABLE MISCELLANEOUS
Qty: 100 | Refills: 0 | Status: ACTIVE | COMMUNITY

## 2025-02-12 RX ORDER — ASPIRIN 81 MG/1
1 TABLET TABLET, COATED ORAL ONCE A DAY
Status: ACTIVE | COMMUNITY

## 2025-02-12 NOTE — HPI-TODAY'S VISIT:
Pt is a 74 year male, after a previous visit on Jan 2025 for an evaluation for elevated liver enzymes and and fatty liver and cirrhosis and CLL and hep b core pos and recent worsening ascites.  A copy of this document will be sent to the referring provider.     Pt is coming in for the acalabrutinib induced potential risk to reactivate hep b and so he is to start the vemilidy and start the appeal for this and start on samples.   Dr Calderón and started it yesterday and so we need to get started on this today.  That medicine has a warning about hep b prior pts.   In open label clinical trials of acalabrutinib in patients with CLL and mantle cell lymphoma, serum aminotransferase elevations occurred in 19% to 23% of patients during therapy and stella to above 5 times ULN in 2% to 3%. These elevations were transient and resolved spontaneously but occasionally led to early drug discontinuation. Among the 610 patients treated with acalabrutinib in pre-registration trials, there were no instances of clinically apparent liver injury attributed to its use, but there was a single instance of acute liver failure and death due to reactivation of hepatitis B. Similar cases of reactivation have been reported with ibrutinib, another small molecule inhibitor of Bruton's tyrosine kinase. Experience with acalabrutinib has been limited and the frequency of clinically apparent liver injury and reactivation of hepatitis B are not known. The2 LiverTox majority of cases have occurred in patients taking multiple immunosuppressive agents and not just acalabrutinib alone. Likelihood score: D (possible rare cause of reactivation of hepatitis B).  Epic message: Dr Calderón, she was considering this. Since he is hep b core positive and she and I are  aware of the risk that raises with this med he needed to be started. Per livertox: In open label clinical trials of acalabrutinib in patients with CLL and mantle cell lymphoma, serum aminotransferase elevations occurred in 19% to 23% of patients during therapy and stella to above 5 times ULN in 2% to 3%. These elevations were transient and resolved spontaneously but occasionally led to early drug discontinuation. Among the 610 patients treated with acalabrutinib in pre-registration trials, there were no instances of clinically apparent liver injury attributed to its use, but there was a single instance of acute liver failure and death due to reactivation of hepatitis B. Similar cases of reactivation have been reported with ibrutinib, another small molecule inhibitor of Bruton's tyrosine kinase. Experience with acalabrutinib has been limited and the frequency of clinically apparent liver injury and reactivation of hepatitis B are not known. The majority of cases have occurred in patients taking multiple immunosuppressive agents and not just acalabrutinib alone. Likelihood score: D (possible rare cause of reactivation of hepatitis B).   Entecavir is allowwed for hep b tx but it requires fasting 2 pre and 2 hours and that is hard to do.  Tenofovir is also possible TDF 300mg and TAF 25mg.  Taf is smaller and easier to swallow than tdf and less bone and renal risk and he is on a tx cll and older age and that i s a risk so Taf is safer for him and appeal for that.  February 5 labs show us that your glucose was 105 slightly up and unclear if you were fasting? BUN of 18 creatinine 0.88 sodium 139 potassium 4.7 calcium 8.8 albumin 4.2 bilirubin 1.1 which was a little lower. Alk phos slightly higher at 161 from 159. AST slightly higher at 60 from 56 and ALT slightly lower at 25 from 26. These labs are only done 2 days after the other set of labs.  Re ascites taking water pills. Not needing more drainage.   Feb 3 labs show an INR normal at 1.2 but prothrombin time slightly up at 13.5 seconds. Sugar was slightly elevated to 100 and unclear if you were fasting? BUN was normal at 22 creatinine 0.91 sodium 140 potassium 4.2 calcium 9.2 albumin 4.2. Bilirubin was 1.3 alk phos 159 AST of 56 and ALT of 26 with ideal ALT less than 35. WBC was 5.3 hemoglobin 14.2 platelet count was low at 61 MCV 87 with neutrophils low at 0.9 lymphocytes 3.2.  The prior labs that we had on you and were actually before your diuretics when you on December 20 had labs and noted to have a BUN of 13 and a creatinine 0.93 so these are fairly close to what those labs were and still within the normal range so we can follow you on that. Your sodium had been previously 143 and potassium 4.0 so those remained stable as well. The albumin was 3.7 bilirubin 1.3 AST 55 ALT of 24 so these again very similar. Please let us know how you are doing as I did see that you require that one paracentesis so far and need toknow how your weight checks are running? We need you to track your weight so we can see if the diuretics are slowing the ascites down or if they are not we need to try to make an increase in the doses for the diuretics.  In addition we did receive a note from your [facility] paracentesis cytology from January 29 where they said that the fluid was sent to look for any malignant cells and no malignant cells were seen.  Wt 149 to 127 so lost 22 pounds.  watching the labs.  In dec, heme  felt that despite full stains that they did to try to look for this lymphoma they saw no evidence of any lymphoma in the fluid detected cells.  January 29 2025 tab led to a 3.3 L paracentesis on you. Nucleated cell total count not given but neutrophils 5% and lymphocytes 59% and monocytes 34% and mesothelial cells 2% so probably not infected but pending that to be sure. There are appear fluid also sent off for cytology on the ascites.  Prior visit He says that he is feeling like more fluid issues since the last visit. Asked if on a low salt diet and he says he is on that. Asked re weight and he is weighing and 142 and that is 10 pounds more than usual.  Need to start on water pills and we can order.  Very impo look to look at salt of food and uses Mrs Duckworth.  January 7 ultrasound shows liver echogenicity appears coarsened and increased with no lesions. Portal vein was patent/open as expected. Gallstone seen in gallbladder but no size given this time. Gallbladder wall thickened 6 mm. Common bile duct was 4 mm. Small amount of ascites was seen so important that you stay on a low-salt diet. Pancreas were visualized appeared normal. Head and tail not well-seen. Right kidney measured 11.8 cm with no hydronephrosis. Spleen enlarged at 16.2 cm. In summary, coarsened echotexture seen consistent with your history of cirrhosis and no focal lesions. Small gallstone seen with thickened gallbladder wall at 6 mm. Small ascites. If clinical symptoms of concern consider HIDA was recommended. Splenomegaly was seen.  Last labs in dec and we would want to do labs when weather allows.  As you recall you had am mri scan done in October 2 2024 at [facility] for your oncologist/hematologist. The liver was cirrhotic and nodular then with some signs of fibrosis seen as well. They did mention you had some perfusional changes which typically they did recommend to be assessed intermittently. No suspicious lesions seen. The gallbladder had a single gallstone with diffuse submucosal edema and wall thickening which they thought could be third spacing of volume overload and no bile duct dilation. Spleen was 16.5 cm and which was very similar and previously had been 14.5 cm. Back in October of last year your ultrasound showed gallbladder wall thickening at 6 mm and a 6 mm gallstone as well.Small volume ascites has been seen prior also.  Patient saw oncology [facility] on December 23, 2024: Chronic Lymphocytic Leukemia (CLL) Recent MRI showed significant increase in the size of numerous retroperitoneal lymph nodes above and below the diaphragm with increase in the degree of splenomegaly concerning for worsening lymphoproliferative process. No current CLL therapy. Patient reports feeling of fullness in abdomen and difficulty breathing when sitting. -Expedite appointment with hematologist (Dr. Haywood) to discuss potential need for CLL therapy.  Labs December 20 show ferritin level low at 12 sodium 143 potassium 4.0 chloride 108 CO2 23 calcium 8.9 BUN of 13 creat 0.93 glucose 88 total protein 6.7 albumin 3.7 alk phos elevated at 108 AST 55 ALT 24 bilirubin elevated at 1.3 iron sat high at 64% B12 310 WBC 4.6 hemoglobin 13.3 MCV 88.5 platelet count low at 49 neutrophils low at 0.54 lymphocytes 3.11 monocytes 0.48 eosinophils up at 0.41 basophil 0.05 immature granulocytes 0.02.  Patient has appointment January 23, 2025 with Dr. Calderón. To see them tomorrow. He is to do labs in [place].  Oct 2 2024 FINDINGS: Lower Thorax: Normal. Liver: Iron deposition. Liver has a cirrhotic morphology, with a nodular contour, relative hypertrophy of the left lobe, and T2 hyperintense fine reticulations compatible with fibrosis. More conspicuous ill-defined arterial enhancing foci for example  (9:24, 34, 35) without correlate on additional sequences, likely perfusional. No suspicious liver lesions. Conventional hepatic arterial anatomy. Gallbladder/Biliary Tree: Single gallstone with diffuse submucosal edema and wall thickening could be secondary to third spacing/volume overload. No biliary ductal dilatation. Spleen: Splenomegaly measuring 16.5 cm in the oblique craniocaudal dimension, previously 14.5 cm, when measured similarly. Similar nonenhancing punctate foci. Pancreas: Intrinsic T1 hyperintensity is slightly reduced. No main ductal dilatation.  Adrenal Glands: Normal Kidneys/Ureters: Small renal cysts including renal sinus cysts. No hydronephrosis. Symmetrical enhancement. Gastrointestinal: No bowel obstruction. Stool throughout the visualized colon. Lymph Nodes: Interval increased peripancreatic, periportal, and retroperitoneal lymph nodes, for example aortocaval lymph node conglomerate measures approximately 5.1 x 3.2 cm (4:27), previously 2.9 x 1.3 cm. Single aortocaval lymph node measures 1.6 x 3.3 cm (Series 9, Image 71), previously 1.4 x 2.9 cm Peripancreatic lymph node adjacent to the tail measures 2.8 x 1.7 cm (20:20), previously 2 x 1.1 cm. Partially visualized enlarged cardiophrenic (measuring 9 mm) and axillary lymph nodes measuring up to 2.3 cm in the right axilla, for example. Vessels: Normal caliber abdominal aorta. Encased IVC is compressed by the surrounding daniel enlargement in the retroperitoneum without hemodynamically significant stenosis. Similar encasement of the left renal vein without stenosis. Patent portal vein.  Perigastric and perisplenic varices. Peritoneum/Retroperitoneum: Mild ascites. Mesenteric edema.  Bones/Soft Tissues: Susceptibility artifact from L4-L5 spinal fixation hardware and mild degenerative changes. No suspicious enhancing osseous lesions.   IMPRESSION: 1. No suspicious hepatic lesions. Punctate arterially enhancing observations without correlate on additional sequences, favored to represent perfusion anomalies (LR-2).  2. Morphologic features of cirrhosis with stigmata of portal hypertension including mild ascites, upper abdominal varices and splenomegaly. 3. Significant increase in the size of numerous retroperitoneal lymph nodes above and below the diaphragm with increase in the degree of splenomegaly concerning for worsening lymphoproliferative process.  Asked what her plan is left the practice and he is to see a new doctor there for his this week to go over scan.  October 1: Ultrasound showed visualized pancreas portions appeared unremarkable. Liver coarsening and its echotexture and with no suspicious liver lesion. Common bile duct was 4 mm. Gallbladder wall thickening measuring 6 mm was seen which may be due to its contracted state and underlying chronic liver disease. There was a 6 mm gallstone that was seen. No gallbladder distention or pericholecystic fluid seen however. You did have small volume ascites that was seen and important that you go or stay on a low-salt diet to keep this under control. Right kidney 13.1 cm with no hydronephrosis. Liver vessels patent and splenic vessels patent as well. Spleen was enlarged at 16 cm. Overall, they felt that you had a cirrhotic liver with patent vessels and some nonspecific gallbladder wall thickening that could be related due to cirrhosis or portal hypertension. Gallstone was seen. Splenomegaly was seen. Some small volume ascites was seen.  It does appear that the ascites has gone from trace ascites in April now to small volume. We may need to talk about starting you on a diuretic (water pill) after we assess your abdomen to try to help with his fluid retention issue. Please try to be stricter on your salt content of your diet to help with these issues and hopefully try to avoid this.  Pt says he is on low salt diet.   Has not done the follow up the egd aug 2024 and has not done yet. So reminded pt to do this.  May for 2024 EGD showed grade 2 esophageal varices that were medium size in the lower third of the esophagus and 5 bands were placed.  He had complete eradication resulting in deflation of varices.  No bleeding seen.  Mild diffuse portal hypertensive gastropathy seen in the gastric body and duodenum normal.  He recommended a 3-month repeat which would have been for August but I do not see that that was done.  Last visit not on any tx yet for the CLL. Discussed if has any tx to start may need hep b suppressive tx as b core positive  Sept 20 2024 he did for Dr Hobbs: folate 13.2 and na 139 and k 4.4 and cl 107 and co2 25 and ca 8.7 and bun 17 and cr 0.89 amd glu 109 and tp 6.8 and alb 3.6 and alk 115 and ast 57 and alt 28 and tb 1.1 and iron sat 9% and low and needs iron (says he was given iron iv for this x 2).  b12 265 and  wbc 8.5 and hg 11.5 little low and hct 39.4 and platelets 45.  neutrophils 2.05 and lymphs 5.22 elevated.   April 12 labs showed glucose elevated at 137 and previously was elevated at 126. Please share with primary provider. He was fasting and he will look at with primary. Bun of 18 creatinine 0.84 sodium 140 potassium 4.9 calcium 9.0 albumin 4.3 bilirubin 0.8 alk phos 120 with AST 62 up from 52 last time and ALT 29. Unfortunately since June of last year your AST went from 33-52-62. Are you on a new medicine? Have you been ill recently? No new reason. WBC 7.3 which is stable for you hemoglobin a little lower at 11.6 from 12.7 in January. MCV normal at 79 but still just at the lower limit of normal. Platelets 56,000 and previously 58,000. Neutrophils normal at 1.9 the lymphocytes a little up at 4.1. INR normal at 1.1.  April 9 ultrasound shows pancreas not well-seen due to gas and body habitus and very limited assessment unremarkable. The liver continues to be coarsened echotexture with no lesions. Liver vessels were patent as expected. Mild gallbladder wall thickening seen at 6 mm but no pericholecystic fluid and an echogenic gallstone was seen.  They suspect that this could be related due to chronic liver disease and less likely due to gallbladder inflammation.  They recommended for us to correlate with clinical history such as if you have symptoms or not of any pain. Common bile duct was normal at 4 mm. Right kidney 12.4 cm with no hydronephrosis. Spleen is mildly enlarged at 14.7 cm and you have trace ascites in the perisplenic space.  Very important that you maintain a low-salt diet with this history. Splenic vessels were patent as expected. In summary, the liver is coarsened echotexture but with no lesions and consistent with chronic liver disease/cirrhosis etiology.  Nonspecific gallbladder wall thickening that could be related due to chronic liver disease or less likely due to gallbladder issues.  Please let us know if you having any symptoms.   Doug 3 labs show sugar up at 126 and possibly not fasting this day? BUN of 13 and creatinine 0.78 both normal. Sodium 141 potassium 4.1 albumin 4.5 bilirubin 0.9 alk phos 96 with AST up a little at 52 and ALT of 34 and previously AST 33 and ALT 22. Sometimes patients with fatty liver near the holidays go up a little underweight or become less active and that can usually cause this to worsen in the winter months. WBC 7.3 hemoglobin a little low but higher at 12.7 from 11.5 previously. MCV was low normal at 79. The trend is getting a little lower which makes me worry if you are getting close to being iron deficient? Platelet count was low at 58 from previous 66. Neutrophils normal at 1.9 but lymphocytes up at 4.4. Previously they have been 2.9 and prior to that 3.1. INR 1.2 which is normal range still. Meld low at 8 and meld na 8.   Dec 28 2023 : partially visualized pancreas unremarkable. Coarsened echotexture of the liver parenchyma with surface nodularity and compatible with cirrhosis. No focal liver mass. Mild thickening of the gallbladder wall without pericholecystic fluid and similar to prior study. Some gallstones seen up to 6mm. Common bile duct 4mm and normal. Right kidney 12.4cm and no hydronephrosis. Spleen 14.8cm. Trace perisplenic ascites seen. Need you to stay on low salt diet with this issue. Doppler vessels patent. In summary, cirrhosis and portal hypertension changes seen in the form of splenomegaly and trace ascites. No focal mass.  Gallstones seen without acute cholecystitits and apparent mild gallbaldder wall thickening and no pericholecystic fluid favoring due to chronic liver disease.  Doppler vessels patent but hepatic aretery slightly decreased in resistive index and nonspecific.  Need liver labs to correlate with this.  Pt says he is on low salt diet. He lives near [place] so we can do the u.s in [place] and do visit to review that.  June 9 labs show glucose of 84 which is normal this time. Previously had been elevated at 118 on a comparison labs that they gave us from 2 years ago. Please share with primary provider to be aware of. BUN of 14 creatinine slightly low at 0.71 sodium 140 potassium 4.1. Chloride slightly up at 107 and may reflect hydrational state that day. Calcium 8.7 albumin 4.0 bilirubin 0.7 alk phos 87 with an AST of 33 and ALT of 22 which appeared to be lower than the comparison labs that they gave on the current sample but again from 2 years ago. Ideal ALT is less than 35. WBC 5.7 hemoglobin was low though at 11.5 hematocrit low at 35.5 with a previous comparison values from 2 years ago being normal. Platelet count was slightly higher now at 66 from previous comparison level of 63. MCV normal at 83 and neutrophils 2.1 and lymphocytes 2.9. INR normal at 1.2. From the note, we saw some more recent labs that you did with Dr. Hobbs that it showed that your hemoglobin likewise was higher at 14.7 and it is clearly lower now platelets were 57,000 then. Liver labs were AST of 36 and ALT of 25 so those are very close to these.  The main difference appears to be that your hemoglobin is a little lower and I would asked that you follow-up with Dr. Jessica Hobbs your hematologist regarding this issue.  Dr Hobbs to see May and told all is ok.  May 30 2023 ultrasound shows liver is mildly increased/coarsened in echotexture but with no suspicious lesions. Common bile duct was normal at 3.1 mm. A likely small 6 mm calculus/stone was seen in the gallbladder.  They felt that the gallbladder wall measurement of 4.2 mm was a technical error estimation.  No pericholecystic fluid was seen. Pancreatic tail was not seen due to gas and that the image pancreas was otherwise grossly unremarkable. Right kidney was 12.3 cm with no stones or hydronephrosis.  Spleen was enlarged at 14 cm with no splenic lesions. No ascites was seen. Liver vessels were patent as expected. Splenic vessels were patent. In summary, they felt that you had an increase/coarsened hepatic echotexture in keeping with your history of fatty liver and early chronic parenchymal liver disease.  No suspicious lesions were seen.  Probable 6 mm gallstone seen with no evidence of acute cholecystitis that they could clearly see.  Splenomegaly was seen as well. Of note, on your previous ultrasound last year you had a 6 mm stone also seen in the gallbladder and your spleen measured 14.2 cm then.  Last labs he did from Dr Hobbs were 2m ago.  2023 labs done by Dr. Jessica Hobbs show AFP normal at 2.6.   sodium 137 potassium 3.7 chloride 105 CO2 25 BUN 17 creatinine 0.73 glucose 230 albumin 4.2 alk phos 84 AST 36 ALT 25 bilirubin 1.2 elevated folate 16.9 and ferritin 37 B12 447 iron sat 19% WBC 5.2 hemoglobin 14.7 plate count 57 MCV 83.6  He sees her in 3m and so she will see soon and do the full labs then,  Jan 2023 ct  Narrative & Impression EXAM: CT ANGIO CHEST W IV CONTRAST, CT ANGIO ABDOMEN PELVIS W IV CONTRAST   CLINICAL INDICATION: TAVR/progress trial.   TECHNIQUE: Non-ECG-gated CT images of the chest, abdomen and pelvis were obtained with nonionic intravenous contrast according to CT angiogram protocol.  Advanced offline 3D post-processing was performed utilizing a dedicated 3D workstation.  There were no immediate complications reported. If applicable, point-of-care testing?was approved following departmental protocol.   COMPARISON: 1/28/2019.   FINDINGS:   VASCULAR: 3 leaflet aortic valve however, there is leaflet thickening and calcification and appearances are compatible with at least moderate aortic stenosis. Normal caliber aortic root. No coronary artery anomaly. Moderately severe coronary artery calcification. Normal caliber thoracic aorta with no significant atherosclerotic calcification or atheromatous plaque. No significant tortuosity. Coronary artery bypass grafting with left internal mammary and at least one saphenous vein bypass grafts. 3 vessel aortic arch. The branch vessels are normal caliber patent. Normal caliber abdominal aorta with mild gross atherosclerotic calcification. No significant plaque. No significant tortuosity. Patent celiac axis, SMA, bilateral renal arteries and accessory right renal artery and GERALDO. Normal caliber iliac and common femoral arteries with moderate atherosclerotic calcification. Focal calcified plaque with in the mid right external iliac artery with a luminal diameter minimally measuring 7 mm. Calcified plaque within the left common iliac artery. The luminal diameter minimally measures 6 mm.   CHEST: The lower neck and thoracic inlet are unremarkable.   Heart size is within normal limits. Normal caliber central pulmonary arteries. Small patent foramen ovale. Minor mitral valve annular calcification. No pericardial effusion or thickening.   There are a few subcentimeter lymph nodes. There are borderline enlarged axillary lymph nodes with the largest axillary lymph node on the right measuring 18 mm (in maximum short axis diameter) and on the left 12 mm. Lymph node enlargement is new compared to the prior study. Appearances may represent reactive lymph nodes. Is there a history of lymphoma proliferative disorder such as CLL/SLL?   The central airways are patent. No new or growing nodules calcified old granulomatous disease. No pleural effusion or thickening.   ABDOMEN/PELVIS: No focal abnormality within the liver. No biliary duct dilatation. Tiny calcified calculus within the gallbladder. No cholecystitis. Splenomegaly. The spleen measures 15 cm, 12 cm on the previous study.   The adrenal glands are unremarkable. Normal size kidneys with good cortical thickness and symmetric enhancement.   The pancreas is unremarkable. Minor diverticulosis without diverticulitis. The bowel and mesentery are otherwise on remarkable.   Unremarkable urinary bladder. Normal size prostate.   No free fluid. No lymph node enlargement.   BONES/SOFT TISSUES: Sternotomy wires. Degenerative change. Osteophytes. Screws within the lumbar spine. No aggressive osseous lesion.     IMPRESSION: 3 leaflet aortic valve however, there is leaflet thickening and calcification and appearances are compatible with at least moderate aortic stenosis.    Moderately severe coronary artery calcification. Coronary artery bypass grafting with left internal mammary and at least one saphenous vein bypass grafts.   Normal caliber thoracic aorta with no significant atherosclerotic calcification or atheromatous plaque. No significant tortuosity.   Normal caliber abdominal aorta with mild gross atherosclerotic calcification. No significant plaque. No significant tortuosity.   Normal caliber iliac and common femoral arteries with moderate atherosclerotic calcification. Focal calcified plaque within the mid right external iliac artery with a luminal diameter minimally measuring 7 mm. Calcified plaque within the left common iliac artery. The luminal diameter minimally measures 6 mm.   New bilateral axillary lymph node enlargement is an interval enlargement of the spleen. Appearances are compatible with a lymphoproliferative disorder, specifically CLL/SLL.    Jan 2023 Dr Sesay visit: Hi risk for SAVR due to age, comorbidity, redo Agree with evaluation for Progress moderate AS trial  Lio Sesay MD  He says since then June 13 and maybe doing valve surgery then.  March 20 egd                                                                              Findings:      Grade II varices were found in the middle third of the esophagus and in       the lower third of the esophagus. They were medium in size. Six bands       were successfully placed with complete eradication, resulting in       deflation of varices. There was no bleeding during and at the end of the       procedure. Estimated blood loss was minimal.      The stomach was normal.      The examined duodenum was normal.                                                                                 Impression:            - Grade II esophageal varices. Completely eradicated.                         Banded.                        - Normal stomach.                        - Normal examined duodenum.                        - No specimens collected. Recommendation:        - Perform a colonoscopy today.                        - Repeat upper endoscopy in 3 months for retreatment.   He does the scopes with Dr Nabor radford for the follow up.  March 20 colon: Findings:      The perianal and digital rectal examinations were normal.      The terminal ileum appeared normal.      The colon (entire examined portion) appeared normal.      Non-bleeding internal hemorrhoids were found during retroflexion. The       hemorrhoids were mild.                                                                                 Impression:            - The examined portion of the ileum was normal.                        - The entire examined colon is normal.                        - Non-bleeding internal hemorrhoids.                        - No specimens collected. Recommendation:        - Discharge patient to home (via wheelchair).                        - Repeat colonoscopy in 5 years for surveillance.                        - Return to GI office PRN.  He was last seen on February 15, 2023 by Jyoti Diehl PA-C to talk about EGD surveillance.  Last EGD had been in 2021 and it showed grade 1 varices in patchy mildly erythematous mucosa without bleeding in the gastric antrum.  EGD and colonoscopy were top be set up but apparently they were set up for March 27 the patient called on February 17 to do it sooner elsewhere.  October 2022: [place] labs sent to me. Sodium was 138, potassium 3.9 chloride 105 CO2 23 BUN of 14 creatinine 0.66 calcium 9.2 glucose elevated at 112.  Albumin 4.3 alkaline phosphatase 84 AST 37 ALT 31 with ideal ALT less than 35.  Bilirubin elevated 1.4 but not fractionated.  INR normal at 1.19.  Neutrophils 2.39 lymphocytes 3.13. White blood cell count 6.5 hemoglobin elevated 16.2 hematocrit elevated 50.4 platelet count 60,000.  MCV 85.3 please share with primary provider. February labs showed hemoglobin elevated at 16.4 so very similar to this and now slightly lower.  AST was 38 and ALT 37 last time.  Please share labs with primary provider. Meld 10 and meld na 10 so remaining low.  Oct 25: u.s: pancreas normal in size. Liver is heterogeneous in echogenicity. No definite focal hepatic abnormality. Normal doppler flow. Vessels patent. Gallbladder showed a 6mm stone in the neck of the gallbladder. Common bile duct 3-4 mm and right kidney 12cm. Spleen 14.2cm enlarged. Vessels patent. Overall, heterogeneous liver. 6mm stone seen in the neck of gallbladder. Splenomegaly. Patent vessels. On the prior u.s 4mm stone size was mentioned.  Mri: april 11 2022: liver with chronic liver disease and lobar distribution and nodular contour. No hcc. Vessel patent. varices near esoph and stomach and spleen. spleen 14.5cm. Pancreas normal. Enalrged ln seen and mildly inc. Comaptible with CLL.   April 22 ultrasound shows the liver to have a cirrhotic morphology.  It appears to be heterogeneous in echotexture with mild lobulation of the contour. Common bile duct measured 4 mm. There was an echogenic focus in the dependent portion of the gallbladder without shadowing that they felt could be a small stone. No ascites was seen. Pancreas were visualized appeared unremarkable. Right kidney measured 11.5 cm and had normal echogenicity and no hydronephrosis. Liver vessels were patent. Overall they felt that you had a cirrhotic appearing liver with patent vessels and mild splenomegaly.  Gallstones seen but without mention of inflammation. We plan to redo this in 6 months  Hematology is watching him for now with Dr Anjel and he is to see her next month.  feb 11 2022 na 138 and k 4 and cl 104 and co2 27 and rosa 16 and cr 0.82 and alb 4.4 and tb 1.1 and alk 77 and ast 38 and alt 37 and ldh 228. wbc 7.4 hg 16.4 plat 59. No inr.  Reports continued better control with DM since starting Farxiga 10mg    A1c running at approximately still at 6-7% per pt and this is unchanged in 2023. Does not require regular insulin just prn  and off the metformin.    Weight stable and he is weighing 147 at home and 152 here and prior 155 and at 148.  HLD controlled with lipitor 20 mg.     He does consume alcohol 1-2  drink per year and was encouraged to stop alcohol entirely if possible.  Received COVID Vaccine x 4 (moderna).    Did egd in jan 2021 and grade 1 varices noted.  Per dr. tomlin note recommend repeat in july 2022 and he has not done this. Dr Tomlin and he has to make it.     US 4/27/21- needs repeat in 6 months (Oct 2021).  Denies abdominal distention, weight gain, confusion, GI bleeding, fevers/chills, abdominal pain.    9/22/21- glucose 118 elevated, creatinine 0.79, tbili 0.7, alk phos 116, ast 47 (prev 53), alt 39 (prev 50)- ideal is less than 35, wbc 8.7, rbc 5.95 (please show primary MD), platelets 63 (prev 86), inr 1.1, sodium 144, MELD 7, MELD-Na 7  4/23/21 tb 1.1 alt 49 alp 84 ast 45  April 27 ultrasound shows the liver to be coarsened with mild nodularity but no discrete liver lesion.  Gallstones are seen but without any gallbladder wall thickening or pericholecystic fluid.  Common bile duct is 2.4 mm.  Pancreas appeared normal to them.  Right kidney appeared to be normal in echogenicity with no renal mass.  Spleen was 12.8 cm.  Liver vessels were patent.  Overall the liver appeared to be coarsened with no focal lesion.  Spleen was upper normal.  I wanted to clarify the size of your right kidney and will await that correction.   They corrected the right kidney size to 11.5cm.  RECAP: he did a recent u/s-cirrhosis noted on imaging. no lesions.  past CT * Final Report *  Reason For Exam Splenomegaly; h/o CLL;Splenomegaly;Cirrhosis of liver  REPORT EXAM: CT Abdomen w/ IV Contrast  CLINICAL INDICATION: Splenomegaly; h/o CLL;Splenomegaly;Cirrhosis of liver.  TECHNIQUE: Following administration of non-ionic IV contrast, postcontrast images through the abdomen were obtained. If applicable, point-of-care testing was approved following departmental protocol. [facility].[id number]  COMPARISON: 1/28/2019  FINDINGS:  Lower Thorax: The visualized lung bases are clear. Punctate pleural calcification on the left posteriorly. Coronary artery calcification.  Liver: Hepatic steatosis with a nodular surface contour and fissural widening, unchanged.  Gallbladder/Biliary Tree: Cholelithiasis. No biliary ductal dilatation or gallbladder wall thickening.  Spleen: The spleen measures 13.1 cm in length, previously 11.6 cm at the same level.  Pancreas: Unremarkable.  Adrenal Glands: Unremarkable.   Kidneys/Ureters: Symmetric renal enhancement without obstruction.  Gastrointestinal: No bowel obstruction. Mild, nonspecific wall thickening in the gastric antral pyloric region.   Lymph Nodes: Multiple retroperitoneal lymph nodes are slightly larger. One left para-aortic node (5:33) measures 13 mm short axis, previously 9 mm. One aortocaval node (5:36) measures 9 mm short axis, previously 5 mm. Additional nodes are slightly larger.  Vessels: Moderate atherosclerosis.  Peritoneum/Retroperitoneum: No free fluid.  Bones/Soft Tissues: Osteopenia with degenerative and postsurgical changes in the spine.  IMPRESSION:   1. Slight increase in the size of the spleen. 2. Slight increased size of multiple retroperitoneal lymph nodes. 3. Nonspecific thickening of the gastric antral pylorus wall. 4. Hepatic steatosis with a nodular surface contour and fissural widening consistent with chronic liver disease.    9/15/20 na 141 k 4.6 gluc 109 cr 0.85 tb 1.2 alt 36 ast 39 alp 84  Plan: 1. He will start the venlidy/TAF 25mg and is smaller and easier to swallow than the other and less bone and renal risk and important as on diuretics for the ascites and taking CLL tx. 2. Pt is to see Dr Tomlin for the egd. Her wanted 3m relook for banding.  3. Pt ascites is much better and down 22 and may get better now with cll tx. 4. Pt will do labs in 2 and 4 and 6 weeks.  5. Discussed some occ issues with hep b prevention medicine. 6. Will send info to [employer] to get it approved and gave samples.   Duration of the visit was 65  minutes with 20 minutes of chart prep and looking up sosa info and livertox info and loading and outpt labs and scans and 45 minutes for the face to face today  with time spent reviewing their prior and recent records and labs and discussing their current status and future plans for care for the patient and his wife.

## 2025-02-13 ENCOUNTER — TELEPHONE ENCOUNTER (OUTPATIENT)
Dept: URBAN - METROPOLITAN AREA CLINIC 86 | Facility: CLINIC | Age: 75
End: 2025-02-13

## 2025-02-18 ENCOUNTER — TELEPHONE ENCOUNTER (OUTPATIENT)
Dept: URBAN - METROPOLITAN AREA CLINIC 86 | Facility: CLINIC | Age: 75
End: 2025-02-18

## 2025-02-19 ENCOUNTER — LAB OUTSIDE AN ENCOUNTER (OUTPATIENT)
Dept: URBAN - METROPOLITAN AREA TELEHEALTH 2 | Facility: TELEHEALTH | Age: 75
End: 2025-02-19

## 2025-02-24 ENCOUNTER — LAB OUTSIDE AN ENCOUNTER (OUTPATIENT)
Dept: URBAN - METROPOLITAN AREA CLINIC 86 | Facility: CLINIC | Age: 75
End: 2025-02-24

## 2025-03-04 ENCOUNTER — OFFICE VISIT (OUTPATIENT)
Dept: URBAN - METROPOLITAN AREA CLINIC 86 | Facility: CLINIC | Age: 75
End: 2025-03-04
Payer: COMMERCIAL

## 2025-03-04 VITALS
HEART RATE: 68 BPM | TEMPERATURE: 96.6 F | SYSTOLIC BLOOD PRESSURE: 98 MMHG | BODY MASS INDEX: 21.99 KG/M2 | HEIGHT: 65 IN | RESPIRATION RATE: 98 BRPM | WEIGHT: 132 LBS | DIASTOLIC BLOOD PRESSURE: 55 MMHG

## 2025-03-04 DIAGNOSIS — I10 HYPERTENSION: ICD-10-CM

## 2025-03-04 DIAGNOSIS — Z71.89 VACCINE COUNSELING: ICD-10-CM

## 2025-03-04 DIAGNOSIS — I85.10 SECONDARY ESOPHAGEAL VARICES WITHOUT BLEEDING: ICD-10-CM

## 2025-03-04 DIAGNOSIS — K57.90 DIVERTICULOSIS: ICD-10-CM

## 2025-03-04 DIAGNOSIS — K31.89 INTESTINAL METAPLASIA OF GASTRIC CARDIA: ICD-10-CM

## 2025-03-04 DIAGNOSIS — M67.40 GANGLION CYST: ICD-10-CM

## 2025-03-04 DIAGNOSIS — E78.5 HYPERLIPIDEMIA: ICD-10-CM

## 2025-03-04 DIAGNOSIS — R74.8 ABNORMAL LIVER ENZYMES: ICD-10-CM

## 2025-03-04 DIAGNOSIS — R93.3 ABNORMAL CT SCAN, STOMACH: ICD-10-CM

## 2025-03-04 DIAGNOSIS — R76.8 HEPATITIS B CORE ANTIBODY POSITIVE: ICD-10-CM

## 2025-03-04 DIAGNOSIS — I85.00 ESOPHAGEAL VARICES WITHOUT BLEEDING, UNSPECIFIED ESOPHAGEAL VARICES TYPE: ICD-10-CM

## 2025-03-04 DIAGNOSIS — R18.8 ASCITES: ICD-10-CM

## 2025-03-04 DIAGNOSIS — K74.69 OTHER CIRRHOSIS OF LIVER: ICD-10-CM

## 2025-03-04 DIAGNOSIS — Z79.899 HIGH RISK MEDICATION USE: ICD-10-CM

## 2025-03-04 DIAGNOSIS — Z12.11 SCREEN FOR COLON CANCER: ICD-10-CM

## 2025-03-04 DIAGNOSIS — K76.0 FATTY LIVER: ICD-10-CM

## 2025-03-04 DIAGNOSIS — E55.9 VITAMIN D DEFICIENCY: ICD-10-CM

## 2025-03-04 DIAGNOSIS — E11.9 DIABETES: ICD-10-CM

## 2025-03-04 DIAGNOSIS — Z87.09 HX OF INTRINSIC ASTHMA: ICD-10-CM

## 2025-03-04 DIAGNOSIS — R93.2 ABNORMAL GALLBLADDER ULTRASOUND: ICD-10-CM

## 2025-03-04 DIAGNOSIS — D69.6 THROMBOCYTOPENIA: ICD-10-CM

## 2025-03-04 DIAGNOSIS — C91.10 CLL (CHRONIC LYMPHOCYTIC LEUKEMIA): ICD-10-CM

## 2025-03-04 PROCEDURE — 99215 OFFICE O/P EST HI 40 MIN: CPT

## 2025-03-04 RX ORDER — INSULIN LISPRO 100 [IU]/ML
(BG-100)/40 = #UNITS FOR BLOOG GLUCOSE GREATER THAN 180 SUBCUTANEOUSLY BEFORE BREAKFAST, LUNCH, AND DINNER INJECTION, SOLUTION INTRAVENOUS; SUBCUTANEOUS
Qty: 10 | Refills: 0 | Status: ACTIVE | COMMUNITY

## 2025-03-04 RX ORDER — SPIRONOLACTONE 50 MG/1
1 TABLET TABLET, FILM COATED ORAL ONCE A DAY
Qty: 30 | Refills: 0 | OUTPATIENT

## 2025-03-04 RX ORDER — ASPIRIN 81 MG/1
1 TABLET TABLET, COATED ORAL ONCE A DAY
Status: ACTIVE | COMMUNITY

## 2025-03-04 RX ORDER — CALCIUM CARBONATE 500(1250)
2 TABLETS TABLET ORAL ONCE A DAY
Status: ACTIVE | COMMUNITY

## 2025-03-04 RX ORDER — FUROSEMIDE 20 MG/1
1 TABLET TABLET ORAL ONCE A DAY
Qty: 30 | Refills: 0 | OUTPATIENT

## 2025-03-04 RX ORDER — SYRINGE-NEEDLE,INSULIN,0.5 ML 30 G X1/2"
USE 3 SYRINGES A DAY TO INJECT INSULIN SYRINGE, EMPTY DISPOSABLE MISCELLANEOUS
Qty: 100 | Refills: 0 | Status: ACTIVE | COMMUNITY

## 2025-03-04 RX ORDER — FUROSEMIDE 20 MG/1
1 TABLET TABLET ORAL ONCE A DAY
Qty: 30 | Refills: 0 | Status: ACTIVE | COMMUNITY

## 2025-03-04 RX ORDER — DAPAGLIFLOZIN 10 MG/1
1 TABLET TABLET, FILM COATED ORAL ONCE A DAY
Status: ACTIVE | COMMUNITY

## 2025-03-04 RX ORDER — METOPROLOL TARTRATE 25 MG/1
TAKE 0.5 TABLET BY ORAL ROUTE ONCE DAILY TABLET, FILM COATED ORAL 1
Refills: 0 | Status: ACTIVE | COMMUNITY
Start: 1900-01-01

## 2025-03-04 RX ORDER — SPIRONOLACTONE 50 MG/1
1 TABLET TABLET, FILM COATED ORAL ONCE A DAY
Qty: 30 | Refills: 0 | Status: ACTIVE | COMMUNITY

## 2025-03-04 RX ORDER — TENOFOVIR ALAFENAMIDE 25 MG/1
1 TABLET WITH FOOD TABLET ORAL ONCE A DAY
Qty: 30 TABLET | Refills: 3 | OUTPATIENT

## 2025-03-04 RX ORDER — TENOFOVIR ALAFENAMIDE 25 MG/1
1 TABLET WITH FOOD TABLET ORAL ONCE A DAY
Qty: 30 TABLET | Refills: 3 | Status: ACTIVE | COMMUNITY
Start: 2025-02-12 | End: 2025-06-12

## 2025-03-04 RX ORDER — ATORVASTATIN CALCIUM 20 MG/1
1 TABLET TABLET, FILM COATED ORAL ONCE A DAY
Status: ACTIVE | COMMUNITY

## 2025-03-04 NOTE — HPI-TODAY'S VISIT:
Pt is a 74 year male, after a previous visit on feb 2025 for an evaluation for elevated liver enzymes and and fatty liver and cirrhosis and CLL and hep b core pos and recent worsening ascites and started acalabrutinin for CLL tx and on vemlidy to prevent reaction.  A copy of this document will be sent to the referring provider.  He is on the vemlidy is not bad at all.  No side effects.  He is doing labs for heme and to see them soon march 14.  Feb 24 2025  na 138 and k 4.1 and cl 104 and co2 27 and ca 9.0 and bun 17 and cr 0.93 and glucose 111 and total protein 6.7 and alb 4.0 and alk 100 and ast 40 and alt 22 and tb 1.2  and wbc 8.6 and hg 12.6 and hct 28.5 and mcv 85.6 and platelets 36 neutrophisl 1.98 and lymphs 5.53  prior ast 45 and tb 1.5.   Pt is coming in for the acalabrutinib induced potential risk to reactivate hep b and so he is to start the vemilidy and start the appeal for this and start on samples.   Dr Calderón will look at med options if platelets stay low.  Acalabrutinib:  In open label clinical trials of acalabrutinib in patients with CLL and mantle cell lymphoma, serum aminotransferase elevations occurred in 19% to 23% of patients during therapy and stella to above 5 times ULN in 2% to 3%. These elevations were transient and resolved spontaneously but occasionally led to early drug discontinuation. Among the 610 patients treated with acalabrutinib in pre-registration trials, there were no instances of clinically apparent liver injury attributed to its use, but there was a single instance of acute liver failure and death due to reactivation of hepatitis B. Similar cases of reactivation have been reported with ibrutinib, another small molecule inhibitor of Bruton's tyrosine kinase. Experience with acalabrutinib has been limited and the frequency of clinically apparent liver injury and reactivation of hepatitis B are not known. The2 LiverTox majority of cases have occurred in patients taking multiple immunosuppressive agents and not just acalabrutinib alone. Likelihood score: D (possible rare cause of reactivation of hepatitis B).  Epic message: Dr Calderón, she was considering this. Since he is hep b core positive and she and I are  aware of the risk that raises with this med he needed to be started. Per livertox: In open label clinical trials of acalabrutinib in patients with CLL and mantle cell lymphoma, serum aminotransferase elevations occurred in 19% to 23% of patients during therapy and stella to above 5 times ULN in 2% to 3%. These elevations were transient and resolved spontaneously but occasionally led to early drug discontinuation. Among the 610 patients treated with acalabrutinib in pre-registration trials, there were no instances of clinically apparent liver injury attributed to its use, but there was a single instance of acute liver failure and death due to reactivation of hepatitis B. Similar cases of reactivation have been reported with ibrutinib, another small molecule inhibitor of Bruton's tyrosine kinase. Experience with acalabrutinib has been limited and the frequency of clinically apparent liver injury and reactivation of hepatitis B are not known. The majority of cases have occurred in patients taking multiple immunosuppressive agents and not just acalabrutinib alone. Likelihood score: D (possible rare cause of reactivation of hepatitis B).  Entecavir is allowwed for hep b tx but it requires fasting 2 pre and 2 hours and that is hard to do.  Tenofovir is also possible TDF 300mg and TAF 25mg.  Taf is smaller and easier to swallow than tdf and less bone and renal risk and he is on a tx cll and older age and that i s a risk so Taf is safer for him and appeal for that.  February 5 labs show us that your glucose was 105 slightly up and unclear if you were fasting? BUN of 18 creatinine 0.88 sodium 139 potassium 4.7 calcium 8.8 albumin 4.2 bilirubin 1.1 which was a little lower. Alk phos slightly higher at 161 from 159. AST slightly higher at 60 from 56 and ALT slightly lower at 25 from 26. These labs are only done 2 days after the other set of labs.  Re ascites taking water pills. Not needing more drainage.   Feb 3 labs show an INR normal at 1.2 but prothrombin time slightly up at 13.5 seconds. Sugar was slightly elevated to 100 and unclear if you were fasting? BUN was normal at 22 creatinine 0.91 sodium 140 potassium 4.2 calcium 9.2 albumin 4.2. Bilirubin was 1.3 alk phos 159 AST of 56 and ALT of 26 with ideal ALT less than 35. WBC was 5.3 hemoglobin 14.2 platelet count was low at 61 MCV 87 with neutrophils low at 0.9 lymphocytes 3.2.  The prior labs that we had on you and were actually before your diuretics when you on December 20 had labs and noted to have a BUN of 13 and a creatinine 0.93 so these are fairly close to what those labs were and still within the normal range so we can follow you on that. Your sodium had been previously 143 and potassium 4.0 so those remained stable as well. The albumin was 3.7 bilirubin 1.3 AST 55 ALT of 24 so these again very similar. Please let us know how you are doing as I did see that you require that one paracentesis so far and need toknow how your weight checks are running? We need you to track your weight so we can see if the diuretics are slowing the ascites down or if they are not we need to try to make an increase in the doses for the diuretics.  In addition we did receive a note from your [facility] paracentesis cytology from January 29 where they said that the fluid was sent to look for any malignant cells and no malignant cells were seen.  Wt 149 to 127 so lost 22 pounds.  watching the labs.  In dec, heme  felt that despite full stains that they did to try to look for this lymphoma they saw no evidence of any lymphoma in the fluid detected cells.  January 29 2025 tab led to a 3.3 L paracentesis on you. Nucleated cell total count not given but neutrophils 5% and lymphocytes 59% and monocytes 34% and mesothelial cells 2% so probably not infected but pending that to be sure. There are appear fluid also sent off for cytology on the ascites.  Prior visit He says that he is feeling like more fluid issues since the last visit. Asked if on a low salt diet and he says he is on that. Asked re weight and he is weighing and 142 and that is 10 pounds more than usual.  Need to start on water pills and we can order.  Very impo look to look at salt of food and uses Mrs Gucci.  January 7 ultrasound shows liver echogenicity appears coarsened and increased with no lesions. Portal vein was patent/open as expected. Gallstone seen in gallbladder but no size given this time. Gallbladder wall thickened 6 mm. Common bile duct was 4 mm. Small amount of ascites was seen so important that you stay on a low-salt diet. Pancreas were visualized appeared normal. Head and tail not well-seen. Right kidney measured 11.8 cm with no hydronephrosis. Spleen enlarged at 16.2 cm. In summary, coarsened echotexture seen consistent with your history of cirrhosis and no focal lesions. Small gallstone seen with thickened gallbladder wall at 6 mm. Small ascites. If clinical symptoms of concern consider HIDA was recommended. Splenomegaly was seen.  Last labs in dec and we would want to do labs when weather allows.  As you recall you had am mri scan done in October 2 2024 at [facility] for your oncologist/hematologist. The liver was cirrhotic and nodular then with some signs of fibrosis seen as well. They did mention you had some perfusional changes which typically they did recommend to be assessed intermittently. No suspicious lesions seen. The gallbladder had a single gallstone with diffuse submucosal edema and wall thickening which they thought could be third spacing of volume overload and no bile duct dilation. Spleen was 16.5 cm and which was very similar and previously had been 14.5 cm. Back in October of last year your ultrasound showed gallbladder wall thickening at 6 mm and a 6 mm gallstone as well.Small volume ascites has been seen prior also.  Patient saw oncology [facility] on December 23, 2024: Chronic Lymphocytic Leukemia (CLL) Recent MRI showed significant increase in the size of numerous retroperitoneal lymph nodes above and below the diaphragm with increase in the degree of splenomegaly concerning for worsening lymphoproliferative process. No current CLL therapy. Patient reports feeling of fullness in abdomen and difficulty breathing when sitting. -Expedite appointment with hematologist (Dr. Haywood) to discuss potential need for CLL therapy.  Labs December 20 show ferritin level low at 12 sodium 143 potassium 4.0 chloride 108 CO2 23 calcium 8.9 BUN of 13 creat 0.93 glucose 88 total protein 6.7 albumin 3.7 alk phos elevated at 108 AST 55 ALT 24 bilirubin elevated at 1.3 iron sat high at 64% B12 310 WBC 4.6 hemoglobin 13.3 MCV 88.5 platelet count low at 49 neutrophils low at 0.54 lymphocytes 3.11 monocytes 0.48 eosinophils up at 0.41 basophil 0.05 immature granulocytes 0.02.  Patient has appointment January 23, 2025 with Dr. Calderón. To see them tomorrow. He is to do labs in [place].  Oct 2 2024 FINDINGS: Lower Thorax: Normal. Liver: Iron deposition. Liver has a cirrhotic morphology, with a nodular contour, relative hypertrophy of the left lobe, and T2 hyperintense fine reticulations compatible with fibrosis. More conspicuous ill-defined arterial enhancing foci for example  (9:24, 34, 35) without correlate on additional sequences, likely perfusional. No suspicious liver lesions. Conventional hepatic arterial anatomy. Gallbladder/Biliary Tree: Single gallstone with diffuse submucosal edema and wall thickening could be secondary to third spacing/volume overload. No biliary ductal dilatation. Spleen: Splenomegaly measuring 16.5 cm in the oblique craniocaudal dimension, previously 14.5 cm, when measured similarly. Similar nonenhancing punctate foci. Pancreas: Intrinsic T1 hyperintensity is slightly reduced. No main ductal dilatation.  Adrenal Glands: Normal Kidneys/Ureters: Small renal cysts including renal sinus cysts. No hydronephrosis. Symmetrical enhancement. Gastrointestinal: No bowel obstruction. Stool throughout the visualized colon. Lymph Nodes: Interval increased peripancreatic, periportal, and retroperitoneal lymph nodes, for example aortocaval lymph node conglomerate measures approximately 5.1 x 3.2 cm (4:27), previously 2.9 x 1.3 cm. Single aortocaval lymph node measures 1.6 x 3.3 cm (Series 9, Image 71), previously 1.4 x 2.9 cm Peripancreatic lymph node adjacent to the tail measures 2.8 x 1.7 cm (20:20), previously 2 x 1.1 cm. Partially visualized enlarged cardiophrenic (measuring 9 mm) and axillary lymph nodes measuring up to 2.3 cm in the right axilla, for example. Vessels: Normal caliber abdominal aorta. Encased IVC is compressed by the surrounding daniel enlargement in the retroperitoneum without hemodynamically significant stenosis. Similar encasement of the left renal vein without stenosis. Patent portal vein.  Perigastric and perisplenic varices. Peritoneum/Retroperitoneum: Mild ascites. Mesenteric edema.  Bones/Soft Tissues: Susceptibility artifact from L4-L5 spinal fixation hardware and mild degenerative changes. No suspicious enhancing osseous lesions.   IMPRESSION: 1. No suspicious hepatic lesions. Punctate arterially enhancing observations without correlate on additional sequences, favored to represent perfusion anomalies (LR-2).  2. Morphologic features of cirrhosis with stigmata of portal hypertension including mild ascites, upper abdominal varices and splenomegaly. 3. Significant increase in the size of numerous retroperitoneal lymph nodes above and below the diaphragm with increase in the degree of splenomegaly concerning for worsening lymphoproliferative process.  Asked what her plan is left the practice and he is to see a new doctor there for his this week to go over scan.  October 1: Ultrasound showed visualized pancreas portions appeared unremarkable. Liver coarsening and its echotexture and with no suspicious liver lesion. Common bile duct was 4 mm. Gallbladder wall thickening measuring 6 mm was seen which may be due to its contracted state and underlying chronic liver disease. There was a 6 mm gallstone that was seen. No gallbladder distention or pericholecystic fluid seen however. You did have small volume ascites that was seen and important that you go or stay on a low-salt diet to keep this under control. Right kidney 13.1 cm with no hydronephrosis. Liver vessels patent and splenic vessels patent as well. Spleen was enlarged at 16 cm. Overall, they felt that you had a cirrhotic liver with patent vessels and some nonspecific gallbladder wall thickening that could be related due to cirrhosis or portal hypertension. Gallstone was seen. Splenomegaly was seen. Some small volume ascites was seen.  It does appear that the ascites has gone from trace ascites in April now to small volume. We may need to talk about starting you on a diuretic (water pill) after we assess your abdomen to try to help with his fluid retention issue. Please try to be stricter on your salt content of your diet to help with these issues and hopefully try to avoid this.  Pt says he is on low salt diet.   Has not done the follow up the egd aug 2024 and has not done yet. So reminded pt to do this.  May for 2024 EGD showed grade 2 esophageal varices that were medium size in the lower third of the esophagus and 5 bands were placed. He had complete eradication resulting in deflation of varices. No bleeding seen. Mild diffuse portal hypertensive gastropathy seen in the gastric body and duodenum normal. He recommended a 3-month repeat which would have been for August but I do not see that that was done.  Last visit not on any tx yet for the CLL. Discussed if has any tx to start may need hep b suppressive tx as b core positive  Sept 20 2024 he did for Dr Hobbs: folate 13.2 and na 139 and k 4.4 and cl 107 and co2 25 and ca 8.7 and bun 17 and cr 0.89 amd glu 109 and tp 6.8 and alb 3.6 and alk 115 and ast 57 and alt 28 and tb 1.1 and iron sat 9% and low and needs iron (says he was given iron iv for this x 2).  b12 265 and  wbc 8.5 and hg 11.5 little low and hct 39.4 and platelets 45.  neutrophils 2.05 and lymphs 5.22 elevated.   April 12 labs showed glucose elevated at 137 and previously was elevated at 126. Please share with primary provider. He was fasting and he will look at with primary. Bun of 18 creatinine 0.84 sodium 140 potassium 4.9 calcium 9.0 albumin 4.3 bilirubin 0.8 alk phos 120 with AST 62 up from 52 last time and ALT 29. Unfortunately since June of last year your AST went from 33-52-62. Are you on a new medicine? Have you been ill recently? No new reason. WBC 7.3 which is stable for you hemoglobin a little lower at 11.6 from 12.7 in January. MCV normal at 79 but still just at the lower limit of normal. Platelets 56,000 and previously 58,000. Neutrophils normal at 1.9 the lymphocytes a little up at 4.1. INR normal at 1.1.  April 9 ultrasound shows pancreas not well-seen due to gas and body habitus and very limited assessment unremarkable. The liver continues to be coarsened echotexture with no lesions. Liver vessels were patent as expected. Mild gallbladder wall thickening seen at 6 mm but no pericholecystic fluid and an echogenic gallstone was seen.  They suspect that this could be related due to chronic liver disease and less likely due to gallbladder inflammation.  They recommended for us to correlate with clinical history such as if you have symptoms or not of any pain. Common bile duct was normal at 4 mm. Right kidney 12.4 cm with no hydronephrosis. Spleen is mildly enlarged at 14.7 cm and you have trace ascites in the perisplenic space.  Very important that you maintain a low-salt diet with this history. Splenic vessels were patent as expected. In summary, the liver is coarsened echotexture but with no lesions and consistent with chronic liver disease/cirrhosis etiology.  Nonspecific gallbladder wall thickening that could be related due to chronic liver disease or less likely due to gallbladder issues.  Please let us know if you having any symptoms.   Doug 3 labs show sugar up at 126 and possibly not fasting this day? BUN of 13 and creatinine 0.78 both normal. Sodium 141 potassium 4.1 albumin 4.5 bilirubin 0.9 alk phos 96 with AST up a little at 52 and ALT of 34 and previously AST 33 and ALT 22. Sometimes patients with fatty liver near the holidays go up a little underweight or become less active and that can usually cause this to worsen in the winter months. WBC 7.3 hemoglobin a little low but higher at 12.7 from 11.5 previously. MCV was low normal at 79. The trend is getting a little lower which makes me worry if you are getting close to being iron deficient? Platelet count was low at 58 from previous 66. Neutrophils normal at 1.9 but lymphocytes up at 4.4. Previously they have been 2.9 and prior to that 3.1. INR 1.2 which is normal range still. Meld low at 8 and meld na 8.   Dec 28 2023 : partially visualized pancreas unremarkable. Coarsened echotexture of the liver parenchyma with surface nodularity and compatible with cirrhosis. No focal liver mass. Mild thickening of the gallbladder wall without pericholecystic fluid and similar to prior study. Some gallstones seen up to 6mm. Common bile duct 4mm and normal. Right kidney 12.4cm and no hydronephrosis. Spleen 14.8cm. Trace perisplenic ascites seen. Need you to stay on low salt diet with this issue. Doppler vessels patent. In summary, cirrhosis and portal hypertension changes seen in the form of splenomegaly and trace ascites. No focal mass.  Gallstones seen without acute cholecystitits and apparent mild gallbaldder wall thickening and no pericholecystic fluid favoring due to chronic liver disease.  Doppler vessels patent but hepatic aretery slightly decreased in resistive index and nonspecific.  Need liver labs to correlate with this.  Pt says he is on low salt diet. He lives near [place] so we can do the u.s in [place] and do visit to review that.  June 9 labs show glucose of 84 which is normal this time. Previously had been elevated at 118 on a comparison labs that they gave us from 2 years ago. Please share with primary provider to be aware of. BUN of 14 creatinine slightly low at 0.71 sodium 140 potassium 4.1. Chloride slightly up at 107 and may reflect hydrational state that day. Calcium 8.7 albumin 4.0 bilirubin 0.7 alk phos 87 with an AST of 33 and ALT of 22 which appeared to be lower than the comparison labs that they gave on the current sample but again from 2 years ago. Ideal ALT is less than 35. WBC 5.7 hemoglobin was low though at 11.5 hematocrit low at 35.5 with a previous comparison values from 2 years ago being normal. Platelet count was slightly higher now at 66 from previous comparison level of 63. MCV normal at 83 and neutrophils 2.1 and lymphocytes 2.9. INR normal at 1.2. From the note, we saw some more recent labs that you did with Dr. Hobbs that it showed that your hemoglobin likewise was higher at 14.7 and it is clearly lower now platelets were 57,000 then. Liver labs were AST of 36 and ALT of 25 so those are very close to these.  The main difference appears to be that your hemoglobin is a little lower and I would asked that you follow-up with Dr. Jessica Hobbs your hematologist regarding this issue.  Dr Hobbs to see May and told all is ok.  May 30 2023 ultrasound shows liver is mildly increased/coarsened in echotexture but with no suspicious lesions. Common bile duct was normal at 3.1 mm. A likely small 6 mm calculus/stone was seen in the gallbladder. They felt that the gallbladder wall measurement of 4.2 mm was a technical error estimation. No pericholecystic fluid was seen. Pancreatic tail was not seen due to gas and that the image pancreas was otherwise grossly unremarkable. Right kidney was 12.3 cm with no stones or hydronephrosis. Spleen was enlarged at 14 cm with no splenic lesions. No ascites was seen. Liver vessels were patent as expected. Splenic vessels were patent. In summary, they felt that you had an increase/coarsened hepatic echotexture in keeping with your history of fatty liver and early chronic parenchymal liver disease. No suspicious lesions were seen. Probable 6 mm gallstone seen with no evidence of acute cholecystitis that they could clearly see. Splenomegaly was seen as well. Of note, on your previous ultrasound last year you had a 6 mm stone also seen in the gallbladder and your spleen measured 14.2 cm then.  Last labs he did from Dr Hobbs were 2m ago.  2023 labs done by Dr. Jessica Hobbs show AFP normal at 2.6.  sodium 137 potassium 3.7 chloride 105 CO2 25 BUN 17 creatinine 0.73 glucose 230 albumin 4.2 alk phos 84 AST 36 ALT 25 bilirubin 1.2 elevated folate 16.9 and ferritin 37 B12 447 iron sat 19% WBC 5.2 hemoglobin 14.7 plate count 57 MCV 83.6  He sees her in 3m and so she will see soon and do the full labs then,  Jan 2023 ct Narrative & Impression EXAM: CT ANGIO CHEST W IV CONTRAST, CT ANGIO ABDOMEN PELVIS W IV CONTRAST  CLINICAL INDICATION: TAVR/progress trial.  TECHNIQUE: Non-ECG-gated CT images of the chest, abdomen and pelvis were obtained with nonionic intravenous contrast according to CT angiogram protocol. Advanced offline 3D post-processing was performed utilizing a dedicated 3D workstation. There were no immediate complications reported. If applicable, point-of-care testing?was approved following departmental protocol.  COMPARISON: 1/28/2019.  FINDINGS:  VASCULAR: 3 leaflet aortic valve however, there is leaflet thickening and calcification and appearances are compatible with at least moderate aortic stenosis. Normal caliber aortic root. No coronary artery anomaly. Moderately severe coronary artery calcification. Normal caliber thoracic aorta with no significant atherosclerotic calcification or atheromatous plaque. No significant tortuosity. Coronary artery bypass grafting with left internal mammary and at least one saphenous vein bypass grafts. 3 vessel aortic arch. The branch vessels are normal caliber patent. Normal caliber abdominal aorta with mild gross atherosclerotic calcification. No significant plaque. No significant tortuosity. Patent celiac axis, SMA, bilateral renal arteries and accessory right renal artery and GERALDO. Normal caliber iliac and common femoral arteries with moderate atherosclerotic calcification. Focal calcified plaque with in the mid right external iliac artery with a luminal diameter minimally measuring 7 mm. Calcified plaque within the left common iliac artery. The luminal diameter minimally measures 6 mm.  CHEST: The lower neck and thoracic inlet are unremarkable.  Heart size is within normal limits. Normal caliber central pulmonary arteries. Small patent foramen ovale. Minor mitral valve annular calcification. No pericardial effusion or thickening.  There are a few subcentimeter lymph nodes. There are borderline enlarged axillary lymph nodes with the largest axillary lymph node on the right measuring 18 mm (in maximum short axis diameter) and on the left 12 mm. Lymph node enlargement is new compared to the prior study. Appearances may represent reactive lymph nodes. Is there a history of lymphoma proliferative disorder such as CLL/SLL?  The central airways are patent. No new or growing nodules calcified old granulomatous disease. No pleural effusion or thickening.  ABDOMEN/PELVIS: No focal abnormality within the liver. No biliary duct dilatation. Tiny calcified calculus within the gallbladder. No cholecystitis. Splenomegaly. The spleen measures 15 cm, 12 cm on the previous study.  The adrenal glands are unremarkable. Normal size kidneys with good cortical thickness and symmetric enhancement.  The pancreas is unremarkable. Minor diverticulosis without diverticulitis. The bowel and mesentery are otherwise on remarkable.  Unremarkable urinary bladder. Normal size prostate.  No free fluid. No lymph node enlargement.  BONES/SOFT TISSUES: Sternotomy wires. Degenerative change. Osteophytes. Screws within the lumbar spine. No aggressive osseous lesion.   IMPRESSION: 3 leaflet aortic valve however, there is leaflet thickening and calcification and appearances are compatible with at least moderate aortic stenosis.  Moderately severe coronary artery calcification. Coronary artery bypass grafting with left internal mammary and at least one saphenous vein bypass grafts.  Normal caliber thoracic aorta with no significant atherosclerotic calcification or atheromatous plaque. No significant tortuosity.  Normal caliber abdominal aorta with mild gross atherosclerotic calcification. No significant plaque. No significant tortuosity.  Normal caliber iliac and common femoral arteries with moderate atherosclerotic calcification. Focal calcified plaque within the mid right external iliac artery with a luminal diameter minimally measuring 7 mm. Calcified plaque within the left common iliac artery. The luminal diameter minimally measures 6 mm.  New bilateral axillary lymph node enlargement is an interval enlargement of the spleen. Appearances are compatible with a lymphoproliferative disorder, specifically CLL/SLL.   Jan 2023 Dr Sesay visit: Hi risk for SAVR due to age, comorbidity, redo Agree with evaluation for Progress moderate AS trial Lio Sesay MD  He says since then June 13 and maybe doing valve surgery then.  March 20 egd Findings: Grade II varices were found in the middle third of the esophagus and in the lower third of the esophagus. They were medium in size. Six bands were successfully placed with complete eradication, resulting in deflation of varices. There was no bleeding during and at the end of the procedure. Estimated blood loss was minimal. The stomach was normal. The examined duodenum was normal.  Impression: - Grade II esophageal varices. Completely eradicated. Banded. - Normal stomach. - Normal examined duodenum. - No specimens collected. Recommendation: - Perform a colonoscopy today. - Repeat upper endoscopy in 3 months for retreatment. He does the scopes with Dr Tomlin to for the follow up.  March 20 colon: Findings: The perianal and digital rectal examinations were normal. The terminal ileum appeared normal. The colon (entire examined portion) appeared normal. Non-bleeding internal hemorrhoids were found during retroflexion. The hemorrhoids were mild.  Impression: - The examined portion of the ileum was normal. - The entire examined colon is normal. - Non-bleeding internal hemorrhoids. - No specimens collected. Recommendation: - Discharge patient to home (via wheelchair). - Repeat colonoscopy in 5 years for surveillance. - Return to GI office PRN.  He was last seen on February 15, 2023 by Jyoti Diehl PA-C to talk about EGD surveillance. Last EGD had been in 2021 and it showed grade 1 varices in patchy mildly erythematous mucosa without bleeding in the gastric antrum. EGD and colonoscopy were top be set up but apparently they were set up for March 27 the patient called on February 17 to do it sooner elsewhere.  October 2022: [place] labs sent to me. Sodium was 138, potassium 3.9 chloride 105 CO2 23 BUN of 14 creatinine 0.66 calcium 9.2 glucose elevated at 112. Albumin 4.3 alkaline phosphatase 84 AST 37 ALT 31 with ideal ALT less than 35. Bilirubin elevated 1.4 but not fractionated. INR normal at 1.19. Neutrophils 2.39 lymphocytes 3.13. White blood cell count 6.5 hemoglobin elevated 16.2 hematocrit elevated 50.4 platelet count 60,000. MCV 85.3 please share with primary provider. February labs showed hemoglobin elevated at 16.4 so very similar to this and now slightly lower. AST was 38 and ALT 37 last time. Please share labs with primary provider. Meld 10 and meld na 10 so remaining low.  Oct 25: u.s: pancreas normal in size. Liver is heterogeneous in echogenicity. No definite focal hepatic abnormality. Normal doppler flow. Vessels patent. Gallbladder showed a 6mm stone in the neck of the gallbladder. Common bile duct 3-4 mm and right kidney 12cm. Spleen 14.2cm enlarged. Vessels patent. Overall, heterogeneous liver. 6mm stone seen in the neck of gallbladder. Splenomegaly. Patent vessels. On the prior u.s 4mm stone size was mentioned.  Mri: april 11 2022: liver with chronic liver disease and lobar distribution and nodular contour. No hcc. Vessel patent. varices near esoph and stomach and spleen. spleen 14.5cm. Pancreas normal. Enalrged ln seen and mildly inc. Comaptible with CLL.  April 22 ultrasound shows the liver to have a cirrhotic morphology. It appears to be heterogeneous in echotexture with mild lobulation of the contour. Common bile duct measured 4 mm. There was an echogenic focus in the dependent portion of the gallbladder without shadowing that they felt could be a small stone. No ascites was seen. Pancreas were visualized appeared unremarkable. Right kidney measured 11.5 cm and had normal echogenicity and no hydronephrosis. Liver vessels were patent. Overall they felt that you had a cirrhotic appearing liver with patent vessels and mild splenomegaly. Gallstones seen but without mention of inflammation. We plan to redo this in 6 months  Hematology is watching him for now with Dr Hobbs and he is to see her next month.  feb 11 2022 na 138 and k 4 and cl 104 and co2 27 and rosa 16 and cr 0.82 and alb 4.4 and tb 1.1 and alk 77 and ast 38 and alt 37 and ldh 228. wbc 7.4 hg 16.4 plat 59. No inr.  Reports continued better control with DM since starting Farxiga 10mg A1c running at approximately still at 6-7% per pt and this is unchanged in 2023. Does not require regular insulin just prn and off the metformin.  Weight stable and he is weighing 147 at home and 152 here and prior 155 and at 148.  HLD controlled with lipitor 20 mg.  He does consume alcohol 1-2 drink per year and was encouraged to stop alcohol entirely if possible.  Received COVID Vaccine x 4 (moderna).  Did egd in jan 2021 and grade 1 varices noted. Per dr. tomlin note recommend repeat in july 2022 and he has not done this. Dr Tomlin and he has to make it.  US 4/27/21- needs repeat in 6 months (Oct 2021). Denies abdominal distention, weight gain, confusion, GI bleeding, fevers/chills, abdominal pain.  9/22/21- glucose 118 elevated, creatinine 0.79, tbili 0.7, alk phos 116, ast 47 (prev 53), alt 39 (prev 50)- ideal is less than 35, wbc 8.7, rbc 5.95 (please show primary MD), platelets 63 (prev 86), inr 1.1, sodium 144, MELD 7, MELD-Na 7  4/23/21 tb 1.1 alt 49 alp 84 ast 45  April 27 ultrasound shows the liver to be coarsened with mild nodularity but no discrete liver lesion. Gallstones are seen but without any gallbladder wall thickening or pericholecystic fluid. Common bile duct is 2.4 mm. Pancreas appeared normal to them. Right kidney appeared to be normal in echogenicity with no renal mass. Spleen was 12.8 cm. Liver vessels were patent. Overall the liver appeared to be coarsened with no focal lesion. Spleen was upper normal. I wanted to clarify the size of your right kidney and will await that correction. They corrected the right kidney size to 11.5cm.  RECAP: he did a recent u/s-cirrhosis noted on imaging. no lesions. past CT * Final Report *  Reason For Exam Splenomegaly; h/o CLL;Splenomegaly;Cirrhosis of liver  REPORT EXAM: CT Abdomen w/ IV Contrast  CLINICAL INDICATION: Splenomegaly; h/o CLL;Splenomegaly;Cirrhosis of liver.  TECHNIQUE: Following administration of non-ionic IV contrast, postcontrast images through the abdomen were obtained. If applicable, point-of-care testing was approved following departmental protocol. [id number]  COMPARISON: 1/28/2019  FINDINGS: Lower Thorax: The visualized lung bases are clear. Punctate pleural calcification on the left posteriorly. Coronary artery calcification.  Liver: Hepatic steatosis with a nodular surface contour and fissural widening, unchanged.  Gallbladder/Biliary Tree: Cholelithiasis. No biliary ductal dilatation or gallbladder wall thickening.  Spleen: The spleen measures 13.1 cm in length, previously 11.6 cm at the same level.  Pancreas: Unremarkable.  Adrenal Glands: Unremarkable.  Kidneys/Ureters: Symmetric renal enhancement without obstruction.  Gastrointestinal: No bowel obstruction. Mild, nonspecific wall thickening in the gastric antral pyloric region.  Lymph Nodes: Multiple retroperitoneal lymph nodes are slightly larger. One left para-aortic node (5:33) measures 13 mm short axis, previously 9 mm. One aortocaval node (5:36) measures 9 mm short axis, previously 5 mm. Additional nodes are slightly larger.  Vessels: Moderate atherosclerosis.  Peritoneum/Retroperitoneum: No free fluid.  Bones/Soft Tissues: Osteopenia with degenerative and postsurgical changes in the spine.  IMPRESSION:  1. Slight increase in the size of the spleen. 2. Slight increased size of multiple retroperitoneal lymph nodes. 3. Nonspecific thickening of the gastric antral pylorus wall. 4. Hepatic steatosis with a nodular surface contour and fissural widening consistent with chronic liver disease.    9/15/20 na 141 k 4.6 gluc 109 cr 0.85 tb 1.2 alt 36 ast 39 alp 84  Plan: 1. He was approved and able to get his ful script of the vemlidy/TAF 25mg and that is smaller and easier to swallow than the other and less bone and renal risk and important as on diuretics for the ascites and taking CLL tx. 2. Ptl liver labs stable to dropping and follow that.  3. Pt was to see Dr Tomlin for the egd for the 3m relook and March 28. For possible banding.  4. Pt ascites continues to be better and staying down. 5. Pt may need to do some hep b labs occ pending course. 6. Pt will call if issues.    Duration of the visit was 50  minutes with 20 minutes of chart prep and looking up [place] info and  loading to ecw of his outpt labs and scans and 30 minutes for the face to face today with time spent reviewing their prior and recent records and labs and discussing their current status and future plans for care for the patient and his family.

## 2025-03-10 ENCOUNTER — LAB OUTSIDE AN ENCOUNTER (OUTPATIENT)
Dept: URBAN - METROPOLITAN AREA CLINIC 86 | Facility: CLINIC | Age: 75
End: 2025-03-10

## 2025-03-14 ENCOUNTER — TELEPHONE ENCOUNTER (OUTPATIENT)
Dept: URBAN - METROPOLITAN AREA CLINIC 86 | Facility: CLINIC | Age: 75
End: 2025-03-14

## 2025-03-14 NOTE — HPI-TODAY'S VISIT:
Dear Shelia Lima, March 11 labs showed WBC normal at 5.5 but hemoglobin a little bit lower at 11.4 from 11.6 in comparison in April of last year.  RBC count a little lower also at 4.02 but MCV normal at 89 and platelet count lower at 32,000. Neutrophils were normal at 1.4 but lymphocytes a little low at 3.3. Glucose was 87 BUN of 20 current 0.79 sodium 139 potassium 4.3 calcium 8.7 albumin 4.0 bilirubin 1.2 and alk phos normal at 113.  AST a little up to 51 and ALT of 30. Hep B DNA remains not detected so the medicine is working to keep that under control. More recent comparison labs from February 24, 2025 had shown that your hemoglobin was 12.6 and platelet count was 36,000 so these are more similar to those and your previous AST had been 40 and ALT of 22 show that these labs are slightly higher.  Very important  to follow these trends overall over time. We will discuss this further with you at your upcoming visit on March 27. Dr. Brady

## 2025-03-18 ENCOUNTER — ERX REFILL RESPONSE (OUTPATIENT)
Dept: URBAN - METROPOLITAN AREA CLINIC 86 | Facility: CLINIC | Age: 75
End: 2025-03-18

## 2025-03-18 RX ORDER — FUROSEMIDE 20 MG/1
1 TABLET TABLET ORAL ONCE A DAY
Qty: 30 | Refills: 0 | OUTPATIENT

## 2025-03-18 RX ORDER — FUROSEMIDE 20 MG/1
TAKE 1 TABLET BY MOUTH DAILY TABLET ORAL
Qty: 30 TABLET | Refills: 0 | OUTPATIENT

## 2025-03-18 RX ORDER — SPIRONOLACTONE 50 MG/1
1 TABLET TABLET, FILM COATED ORAL ONCE A DAY
Qty: 30 | Refills: 0 | OUTPATIENT

## 2025-03-18 RX ORDER — SPIRONOLACTONE 50 MG/1
TAKE 1 TABLET BY MOUTH DAILY TABLET ORAL
Qty: 30 TABLET | Refills: 0 | OUTPATIENT

## 2025-03-24 ENCOUNTER — LAB OUTSIDE AN ENCOUNTER (OUTPATIENT)
Dept: URBAN - METROPOLITAN AREA CLINIC 86 | Facility: CLINIC | Age: 75
End: 2025-03-24

## 2025-03-27 ENCOUNTER — OFFICE VISIT (OUTPATIENT)
Dept: URBAN - METROPOLITAN AREA CLINIC 98 | Facility: CLINIC | Age: 75
End: 2025-03-27
Payer: COMMERCIAL

## 2025-03-27 ENCOUNTER — LAB OUTSIDE AN ENCOUNTER (OUTPATIENT)
Dept: URBAN - METROPOLITAN AREA CLINIC 98 | Facility: CLINIC | Age: 75
End: 2025-03-27

## 2025-03-27 ENCOUNTER — TELEPHONE ENCOUNTER (OUTPATIENT)
Dept: URBAN - METROPOLITAN AREA CLINIC 86 | Facility: CLINIC | Age: 75
End: 2025-03-27

## 2025-03-27 DIAGNOSIS — K76.0 FATTY LIVER: ICD-10-CM

## 2025-03-27 DIAGNOSIS — I85.00 ESOPHAGEAL VARICES WITHOUT BLEEDING, UNSPECIFIED ESOPHAGEAL VARICES TYPE: ICD-10-CM

## 2025-03-27 DIAGNOSIS — R74.8 ABNORMAL LIVER ENZYMES: ICD-10-CM

## 2025-03-27 DIAGNOSIS — C91.10 CLL (CHRONIC LYMPHOCYTIC LEUKEMIA): ICD-10-CM

## 2025-03-27 LAB
ALBUMIN: 4
ALKALINE PHOSPHATASE: 143
ALT (SGPT): 32
AST (SGOT): 59
BASO (ABSOLUTE): 0
BASOS: 1
BILIRUBIN, TOTAL: 1.4
BUN/CREATININE RATIO: 28
BUN: 23
CALCIUM: 8.7
CARBON DIOXIDE, TOTAL: 22
CHLORIDE: 102
CREATININE: 0.81
EGFR: 93
EOS (ABSOLUTE): 0.1
EOS: 2
GLOBULIN, TOTAL: 2.3
GLUCOSE: 116
HBV LOG10: (no result)
HEMATOCRIT: 36.8
HEMATOLOGY COMMENTS:: (no result)
HEMOGLOBIN: 11.7
HEPATITIS B QUANTITATION: (no result)
IMMATURE CELLS: (no result)
IMMATURE GRANS (ABS): 0
IMMATURE GRANULOCYTES: 0
LYMPHS (ABSOLUTE): 3.1
LYMPHS: 71
MCH: 28
MCHC: 31.8
MCV: 88
MONOCYTES(ABSOLUTE): 0.5
MONOCYTES: 13
NEUTROPHILS (ABSOLUTE): 0.6
NEUTROPHILS: 13
NRBC: 1
PLATELETS: 32
POTASSIUM: 4.4
PROTEIN, TOTAL: 6.3
RBC: 4.18
RDW: 16.6
SODIUM: 137
TEST INFORMATION:: (no result)
WBC: 4.3

## 2025-03-27 PROCEDURE — 99214 OFFICE O/P EST MOD 30 MIN: CPT | Performed by: INTERNAL MEDICINE

## 2025-03-27 RX ORDER — CALCIUM CARBONATE 500(1250)
2 TABLETS TABLET ORAL ONCE A DAY
Status: ACTIVE | COMMUNITY

## 2025-03-27 RX ORDER — TENOFOVIR ALAFENAMIDE 25 MG/1
1 TABLET WITH FOOD TABLET ORAL ONCE A DAY
Qty: 30 TABLET | Refills: 3 | Status: ACTIVE | COMMUNITY

## 2025-03-27 RX ORDER — INSULIN LISPRO 100 [IU]/ML
(BG-100)/40 = #UNITS FOR BLOOG GLUCOSE GREATER THAN 180 SUBCUTANEOUSLY BEFORE BREAKFAST, LUNCH, AND DINNER INJECTION, SOLUTION INTRAVENOUS; SUBCUTANEOUS
Qty: 10 | Refills: 0 | Status: ACTIVE | COMMUNITY

## 2025-03-27 RX ORDER — DAPAGLIFLOZIN 10 MG/1
1 TABLET TABLET, FILM COATED ORAL ONCE A DAY
Status: ACTIVE | COMMUNITY

## 2025-03-27 RX ORDER — SYRINGE-NEEDLE,INSULIN,0.5 ML 30 G X1/2"
USE 3 SYRINGES A DAY TO INJECT INSULIN SYRINGE, EMPTY DISPOSABLE MISCELLANEOUS
Qty: 100 | Refills: 0 | Status: ACTIVE | COMMUNITY

## 2025-03-27 RX ORDER — METOPROLOL TARTRATE 25 MG/1
TAKE 0.5 TABLET BY ORAL ROUTE ONCE DAILY TABLET, FILM COATED ORAL 1
Refills: 0 | Status: ACTIVE | COMMUNITY
Start: 1900-01-01

## 2025-03-27 RX ORDER — ATORVASTATIN CALCIUM 20 MG/1
1 TABLET TABLET, FILM COATED ORAL ONCE A DAY
Status: ACTIVE | COMMUNITY

## 2025-03-27 RX ORDER — ASPIRIN 81 MG/1
1 TABLET TABLET, COATED ORAL ONCE A DAY
Status: ACTIVE | COMMUNITY

## 2025-03-27 RX ORDER — SPIRONOLACTONE 50 MG/1
TAKE 1 TABLET BY MOUTH DAILY TABLET ORAL
Qty: 30 TABLET | Refills: 0 | Status: ACTIVE | COMMUNITY

## 2025-03-27 RX ORDER — FUROSEMIDE 20 MG/1
TAKE 1 TABLET BY MOUTH DAILY TABLET ORAL
Qty: 30 TABLET | Refills: 0 | Status: ACTIVE | COMMUNITY

## 2025-03-27 NOTE — HPI-TODAY'S VISIT:
Has CLD and varices Started on hep b treatment Last EGD in 5/24- banding done Colon in 2023- due in 2028 No bleeding.  On treatment for CLL as well.  No CIBH

## 2025-03-27 NOTE — HPI-TODAY'S VISIT:
Dear Shelia Lima,   March 24 labs show wbc 4.3  and hg 11.7 and hct 36.8 and mcv 88 platelets 32  and prior platelets 32.   Neutrophils 0.6 and low and prior 1.4.   Lymphs 3.1.   Glucose 116 elevated and bun 23 and cr 0.81 and na 137 and k 4.4 and cl 102 and co2 22 and ca 8.7 and alb 4.0 and tb 1.4 little up. Alk 143 elevated and prior 113.   Ast 59 and alt 32 and prior ast 51 and alt 30.   HBV dna not detected.   Not clear if liver labs little  up due to CLL medicine as less likely from the HBV medicine.   Dr Brady

## 2025-04-01 ENCOUNTER — LAB OUTSIDE AN ENCOUNTER (OUTPATIENT)
Dept: URBAN - METROPOLITAN AREA TELEHEALTH 2 | Facility: TELEHEALTH | Age: 75
End: 2025-04-01

## 2025-04-02 ENCOUNTER — LAB OUTSIDE AN ENCOUNTER (OUTPATIENT)
Dept: URBAN - METROPOLITAN AREA TELEHEALTH 2 | Facility: TELEHEALTH | Age: 75
End: 2025-04-02

## 2025-04-15 ENCOUNTER — OFFICE VISIT (OUTPATIENT)
Dept: URBAN - METROPOLITAN AREA TELEHEALTH 2 | Facility: TELEHEALTH | Age: 75
End: 2025-04-15

## 2025-04-15 ENCOUNTER — TELEPHONE ENCOUNTER (OUTPATIENT)
Dept: URBAN - METROPOLITAN AREA CLINIC 97 | Facility: CLINIC | Age: 75
End: 2025-04-15

## 2025-04-15 RX ORDER — ASPIRIN 81 MG/1
1 TABLET TABLET, COATED ORAL ONCE A DAY
Status: ACTIVE | COMMUNITY

## 2025-04-15 RX ORDER — FUROSEMIDE 20 MG/1
TAKE 1 TABLET BY MOUTH DAILY TABLET ORAL
Qty: 30 TABLET | Refills: 0 | Status: ACTIVE | COMMUNITY

## 2025-04-15 RX ORDER — ATORVASTATIN CALCIUM 20 MG/1
1 TABLET TABLET, FILM COATED ORAL ONCE A DAY
Status: ACTIVE | COMMUNITY

## 2025-04-15 RX ORDER — SPIRONOLACTONE 50 MG/1
1 TABLET TABLET, FILM COATED ORAL ONCE A DAY
Qty: 30 | Refills: 0 | OUTPATIENT
Start: 2025-04-13

## 2025-04-15 RX ORDER — TENOFOVIR ALAFENAMIDE 25 MG/1
1 TABLET WITH FOOD TABLET ORAL ONCE A DAY
Qty: 30 TABLET | Refills: 3 | OUTPATIENT
Start: 2025-04-13 | End: 2025-08-11

## 2025-04-15 RX ORDER — FUROSEMIDE 20 MG/1
1 TABLET TABLET ORAL ONCE A DAY
Qty: 30 | Refills: 0 | OUTPATIENT
Start: 2025-04-13 | End: 2025-05-13

## 2025-04-15 RX ORDER — SPIRONOLACTONE 50 MG/1
TAKE 1 TABLET BY MOUTH DAILY TABLET ORAL
Qty: 30 TABLET | Refills: 0 | Status: ACTIVE | COMMUNITY

## 2025-04-15 RX ORDER — INSULIN LISPRO 100 [IU]/ML
(BG-100)/40 = #UNITS FOR BLOOG GLUCOSE GREATER THAN 180 SUBCUTANEOUSLY BEFORE BREAKFAST, LUNCH, AND DINNER INJECTION, SOLUTION INTRAVENOUS; SUBCUTANEOUS
Qty: 10 | Refills: 0 | Status: ACTIVE | COMMUNITY

## 2025-04-15 RX ORDER — DAPAGLIFLOZIN 10 MG/1
1 TABLET TABLET, FILM COATED ORAL ONCE A DAY
Status: ACTIVE | COMMUNITY

## 2025-04-15 RX ORDER — METOPROLOL TARTRATE 25 MG/1
TAKE 0.5 TABLET BY ORAL ROUTE ONCE DAILY TABLET, FILM COATED ORAL 1
Refills: 0 | Status: ACTIVE | COMMUNITY
Start: 1900-01-01

## 2025-04-15 RX ORDER — CALCIUM CARBONATE 500(1250)
2 TABLETS TABLET ORAL ONCE A DAY
Status: ACTIVE | COMMUNITY

## 2025-04-15 RX ORDER — TENOFOVIR ALAFENAMIDE 25 MG/1
1 TABLET WITH FOOD TABLET ORAL ONCE A DAY
Qty: 30 TABLET | Refills: 3 | Status: ACTIVE | COMMUNITY

## 2025-04-15 RX ORDER — SYRINGE-NEEDLE,INSULIN,0.5 ML 30 G X1/2"
USE 3 SYRINGES A DAY TO INJECT INSULIN SYRINGE, EMPTY DISPOSABLE MISCELLANEOUS
Qty: 100 | Refills: 0 | Status: ACTIVE | COMMUNITY

## 2025-04-15 NOTE — HPI-TODAY'S VISIT:
Pt is a 74 year male, after a previous visit on March 2025 for an evaluation for elevated liver enzymes and and fatty liver and cirrhosis and CLL and hep b core pos and recent worsening ascites and started acalabrutinin for CLL tx and on vemlidy to prevent reaction.  A copy of this document will be sent to the referring provider.  He is on the vemlidy is not bad at all.  No side effects.  Dear Shelia Lima, March 24 labs show wbc 4.3  and hg 11.7 and hct 36.8 and mcv 88 platelets 32  and prior platelets 32. Neutrophils 0.6 and low and prior 1.4. Lymphs 3.1. Glucose 116 elevated and bun 23 and cr 0.81 and na 137 and k 4.4 and cl 102 and co2 22 and ca 8.7 and alb 4.0 and tb 1.4 little up. Alk 143 elevated and prior 113. Ast 59 and alt 32 and prior ast 51 and alt 30. HBV dna not detected. Not clear if liver labs little  up due to CLL medicine as less likely from the HBV medicine. Dr Brady Dear Shelia Lima,  March 11 labs showed WBC normal at 5.5 but hemoglobin a little bit lower at 11.4 from 11.6 in comparison in April of last year. RBC count a little lower also at 4.02 but MCV normal at 89 and platelet count lower at 32,000.  Neutrophils were normal at 1.4 but lymphocytes a little low at 3.3.  Glucose was 87 BUN of 20 current 0.79 sodium 139 potassium 4.3 calcium 8.7 albumin 4.0 bilirubin 1.2 and alk phos normal at 113. AST a little up to 51 and ALT of 30.  Hep B DNA remains not detected so the medicine is working to keep that under control.  More recent comparison labs from February 24, 2025 had shown that your hemoglobin was 12.6 and platelet count was 36,000 so these are more similar to those and your previous AST had been 40 and ALT of 22 show that these labs are slightly higher. Very important  to follow these trends overall over time.  We will discuss this further with you at your upcoming visit on March 27.  Dr. Brady    Feb 24 2025  na 138 and k 4.1 and cl 104 and co2 27 and ca 9.0 and bun 17 and cr 0.93 and glucose 111 and total protein 6.7 and alb 4.0 and alk 100 and ast 40 and alt 22 and tb 1.2  and wbc 8.6 and hg 12.6 and hct 28.5 and mcv 85.6 and platelets 36 neutrophisl 1.98 and lymphs 5.53  prior ast 45 and tb 1.5.   Pt is coming in for the acalabrutinib induced potential risk to reactivate hep b and so he is to start the vemilidy and start the appeal for this and start on samples.   Dr Calderón will look at med options if platelets stay low.  Acalabrutinib:  In open label clinical trials of acalabrutinib in patients with CLL and mantle cell lymphoma, serum aminotransferase elevations occurred in 19% to 23% of patients during therapy and stella to above 5 times ULN in 2% to 3%. These elevations were transient and resolved spontaneously but occasionally led to early drug discontinuation. Among the 610 patients treated with acalabrutinib in pre-registration trials, there were no instances of clinically apparent liver injury attributed to its use, but there was a single instance of acute liver failure and death due to reactivation of hepatitis B. Similar cases of reactivation have been reported with ibrutinib, another small molecule inhibitor of Bruton's tyrosine kinase. Experience with acalabrutinib has been limited and the frequency of clinically apparent liver injury and reactivation of hepatitis B are not known. The2 LiverTox majority of cases have occurred in patients taking multiple immunosuppressive agents and not just acalabrutinib alone. Likelihood score: D (possible rare cause of reactivation of hepatitis B).  Epic message: Dr Calderón, she was considering this. Since he is hep b core positive and she and I are  aware of the risk that raises with this med he needed to be started. Per livertox: In open label clinical trials of acalabrutinib in patients with CLL and mantle cell lymphoma, serum aminotransferase elevations occurred in 19% to 23% of patients during therapy and stella to above 5 times ULN in 2% to 3%. These elevations were transient and resolved spontaneously but occasionally led to early drug discontinuation. Among the 610 patients treated with acalabrutinib in pre-registration trials, there were no instances of clinically apparent liver injury attributed to its use, but there was a single instance of acute liver failure and death due to reactivation of hepatitis B. Similar cases of reactivation have been reported with ibrutinib, another small molecule inhibitor of Bruton's tyrosine kinase. Experience with acalabrutinib has been limited and the frequency of clinically apparent liver injury and reactivation of hepatitis B are not known. The majority of cases have occurred in patients taking multiple immunosuppressive agents and not just acalabrutinib alone. Likelihood score: D (possible rare cause of reactivation of hepatitis B).  Entecavir is allowwed for hep b tx but it requires fasting 2 pre and 2 hours and that is hard to do.  Tenofovir is also possible TDF 300mg and TAF 25mg.  Taf is smaller and easier to swallow than tdf and less bone and renal risk and he is on a tx cll and older age and that i s a risk so Taf is safer for him and appeal for that.  February 5 labs show us that your glucose was 105 slightly up and unclear if you were fasting? BUN of 18 creatinine 0.88 sodium 139 potassium 4.7 calcium 8.8 albumin 4.2 bilirubin 1.1 which was a little lower. Alk phos slightly higher at 161 from 159. AST slightly higher at 60 from 56 and ALT slightly lower at 25 from 26. These labs are only done 2 days after the other set of labs.  Re ascites taking water pills. Not needing more drainage.   Feb 3 labs show an INR normal at 1.2 but prothrombin time slightly up at 13.5 seconds. Sugar was slightly elevated to 100 and unclear if you were fasting? BUN was normal at 22 creatinine 0.91 sodium 140 potassium 4.2 calcium 9.2 albumin 4.2. Bilirubin was 1.3 alk phos 159 AST of 56 and ALT of 26 with ideal ALT less than 35. WBC was 5.3 hemoglobin 14.2 platelet count was low at 61 MCV 87 with neutrophils low at 0.9 lymphocytes 3.2.  The prior labs that we had on you and were actually before your diuretics when you on December 20 had labs and noted to have a BUN of 13 and a creatinine 0.93 so these are fairly close to what those labs were and still within the normal range so we can follow you on that. Your sodium had been previously 143 and potassium 4.0 so those remained stable as well. The albumin was 3.7 bilirubin 1.3 AST 55 ALT of 24 so these again very similar. Please let us know how you are doing as I did see that you require that one paracentesis so far and need toknow how your weight checks are running? We need you to track your weight so we can see if the diuretics are slowing the ascites down or if they are not we need to try to make an increase in the doses for the diuretics.  In addition we did receive a note from your [facility] paracentesis cytology from January 29 where they said that the fluid was sent to look for any malignant cells and no malignant cells were seen.  Wt 149 to 127 so lost 22 pounds.  watching the labs.  In dec, heme  felt that despite full stains that they did to try to look for this lymphoma they saw no evidence of any lymphoma in the fluid detected cells.  January 29 2025 tab led to a 3.3 L paracentesis on you. Nucleated cell total count not given but neutrophils 5% and lymphocytes 59% and monocytes 34% and mesothelial cells 2% so probably not infected but pending that to be sure. There are appear fluid also sent off for cytology on the ascites.  Prior visit He says that he is feeling like more fluid issues since the last visit. Asked if on a low salt diet and he says he is on that. Asked re weight and he is weighing and 142 and that is 10 pounds more than usual.  Need to start on water pills and we can order.  Very impo look to look at salt of food and uses Mrs Duckworth.  January 7 ultrasound shows liver echogenicity appears coarsened and increased with no lesions. Portal vein was patent/open as expected. Gallstone seen in gallbladder but no size given this time. Gallbladder wall thickened 6 mm. Common bile duct was 4 mm. Small amount of ascites was seen so important that you stay on a low-salt diet. Pancreas were visualized appeared normal. Head and tail not well-seen. Right kidney measured 11.8 cm with no hydronephrosis. Spleen enlarged at 16.2 cm. In summary, coarsened echotexture seen consistent with your history of cirrhosis and no focal lesions. Small gallstone seen with thickened gallbladder wall at 6 mm. Small ascites. If clinical symptoms of concern consider HIDA was recommended. Splenomegaly was seen.  Last labs in dec and we would want to do labs when weather allows.  As you recall you had am mri scan done in October 2 2024 at [facility] for your oncologist/hematologist. The liver was cirrhotic and nodular then with some signs of fibrosis seen as well. They did mention you had some perfusional changes which typically they did recommend to be assessed intermittently. No suspicious lesions seen. The gallbladder had a single gallstone with diffuse submucosal edema and wall thickening which they thought could be third spacing of volume overload and no bile duct dilation. Spleen was 16.5 cm and which was very similar and previously had been 14.5 cm. Back in October of last year your ultrasound showed gallbladder wall thickening at 6 mm and a 6 mm gallstone as well.Small volume ascites has been seen prior also.  Patient saw oncology ANAYELI Alfresco on December 23, 2024: Chronic Lymphocytic Leukemia (CLL) Recent MRI showed significant increase in the size of numerous retroperitoneal lymph nodes above and below the diaphragm with increase in the degree of splenomegaly concerning for worsening lymphoproliferative process. No current CLL therapy. Patient reports feeling of fullness in abdomen and difficulty breathing when sitting. -Expedite appointment with hematologist (Dr. Haywood) to discuss potential need for CLL therapy.  Labs December 20 show ferritin level low at 12 sodium 143 potassium 4.0 chloride 108 CO2 23 calcium 8.9 BUN of 13 creat 0.93 glucose 88 total protein 6.7 albumin 3.7 alk phos elevated at 108 AST 55 ALT 24 bilirubin elevated at 1.3 iron sat high at 64% B12 310 WBC 4.6 hemoglobin 13.3 MCV 88.5 platelet count low at 49 neutrophils low at 0.54 lymphocytes 3.11 monocytes 0.48 eosinophils up at 0.41 basophil 0.05 immature granulocytes 0.02.  Patient has appointment January 23, 2025 with Dr. Calderón. To see them tomorrow. He is to do labs in [place].  Oct 2 2024 FINDINGS: Lower Thorax: Normal. Liver: Iron deposition. Liver has a cirrhotic morphology, with a nodular contour, relative hypertrophy of the left lobe, and T2 hyperintense fine reticulations compatible with fibrosis. More conspicuous ill-defined arterial enhancing foci for example  (9:24, 34, 35) without correlate on additional sequences, likely perfusional. No suspicious liver lesions. Conventional hepatic arterial anatomy. Gallbladder/Biliary Tree: Single gallstone with diffuse submucosal edema and wall thickening could be secondary to third spacing/volume overload. No biliary ductal dilatation. Spleen: Splenomegaly measuring 16.5 cm in the oblique craniocaudal dimension, previously 14.5 cm, when measured similarly. Similar nonenhancing punctate foci. Pancreas: Intrinsic T1 hyperintensity is slightly reduced. No main ductal dilatation.  Adrenal Glands: Normal Kidneys/Ureters: Small renal cysts including renal sinus cysts. No hydronephrosis. Symmetrical enhancement. Gastrointestinal: No bowel obstruction. Stool throughout the visualized colon. Lymph Nodes: Interval increased peripancreatic, periportal, and retroperitoneal lymph nodes, for example aortocaval lymph node conglomerate measures approximately 5.1 x 3.2 cm (4:27), previously 2.9 x 1.3 cm. Single aortocaval lymph node measures 1.6 x 3.3 cm (Series 9, Image 71), previously 1.4 x 2.9 cm Peripancreatic lymph node adjacent to the tail measures 2.8 x 1.7 cm (20:20), previously 2 x 1.1 cm. Partially visualized enlarged cardiophrenic (measuring 9 mm) and axillary lymph nodes measuring up to 2.3 cm in the right axilla, for example. Vessels: Normal caliber abdominal aorta. Encased IVC is compressed by the surrounding daniel enlargement in the retroperitoneum without hemodynamically significant stenosis. Similar encasement of the left renal vein without stenosis. Patent portal vein.  Perigastric and perisplenic varices. Peritoneum/Retroperitoneum: Mild ascites. Mesenteric edema.  Bones/Soft Tissues: Susceptibility artifact from L4-L5 spinal fixation hardware and mild degenerative changes. No suspicious enhancing osseous lesions.   IMPRESSION: 1. No suspicious hepatic lesions. Punctate arterially enhancing observations without correlate on additional sequences, favored to represent perfusion anomalies (LR-2).  2. Morphologic features of cirrhosis with stigmata of portal hypertension including mild ascites, upper abdominal varices and splenomegaly. 3. Significant increase in the size of numerous retroperitoneal lymph nodes above and below the diaphragm with increase in the degree of splenomegaly concerning for worsening lymphoproliferative process.  Asked what her plan is left the practice and he is to see a new doctor there for his this week to go over scan.  October 1: Ultrasound showed visualized pancreas portions appeared unremarkable. Liver coarsening and its echotexture and with no suspicious liver lesion. Common bile duct was 4 mm. Gallbladder wall thickening measuring 6 mm was seen which may be due to its contracted state and underlying chronic liver disease. There was a 6 mm gallstone that was seen. No gallbladder distention or pericholecystic fluid seen however. You did have small volume ascites that was seen and important that you go or stay on a low-salt diet to keep this under control. Right kidney 13.1 cm with no hydronephrosis. Liver vessels patent and splenic vessels patent as well. Spleen was enlarged at 16 cm. Overall, they felt that you had a cirrhotic liver with patent vessels and some nonspecific gallbladder wall thickening that could be related due to cirrhosis or portal hypertension. Gallstone was seen. Splenomegaly was seen. Some small volume ascites was seen.  It does appear that the ascites has gone from trace ascites in April now to small volume. We may need to talk about starting you on a diuretic (water pill) after we assess your abdomen to try to help with his fluid retention issue. Please try to be stricter on your salt content of your diet to help with these issues and hopefully try to avoid this.  Pt says he is on low salt diet.   Has not done the follow up the egd aug 2024 and has not done yet. So reminded pt to do this.  May for 2024 EGD showed grade 2 esophageal varices that were medium size in the lower third of the esophagus and 5 bands were placed. He had complete eradication resulting in deflation of varices. No bleeding seen. Mild diffuse portal hypertensive gastropathy seen in the gastric body and duodenum normal. He recommended a 3-month repeat which would have been for August but I do not see that that was done.  Last visit not on any tx yet for the CLL. Discussed if has any tx to start may need hep b suppressive tx as b core positive  Sept 20 2024 he did for Dr Hobbs: folate 13.2 and na 139 and k 4.4 and cl 107 and co2 25 and ca 8.7 and bun 17 and cr 0.89 amd glu 109 and tp 6.8 and alb 3.6 and alk 115 and ast 57 and alt 28 and tb 1.1 and iron sat 9% and low and needs iron (says he was given iron iv for this x 2).  b12 265 and  wbc 8.5 and hg 11.5 little low and hct 39.4 and platelets 45.  neutrophils 2.05 and lymphs 5.22 elevated.   April 12 labs showed glucose elevated at 137 and previously was elevated at 126. Please share with primary provider. He was fasting and he will look at with primary. Bun of 18 creatinine 0.84 sodium 140 potassium 4.9 calcium 9.0 albumin 4.3 bilirubin 0.8 alk phos 120 with AST 62 up from 52 last time and ALT 29. Unfortunately since June of last year your AST went from 33-52-62. Are you on a new medicine? Have you been ill recently? No new reason. WBC 7.3 which is stable for you hemoglobin a little lower at 11.6 from 12.7 in January. MCV normal at 79 but still just at the lower limit of normal. Platelets 56,000 and previously 58,000. Neutrophils normal at 1.9 the lymphocytes a little up at 4.1. INR normal at 1.1.  April 9 ultrasound shows pancreas not well-seen due to gas and body habitus and very limited assessment unremarkable. The liver continues to be coarsened echotexture with no lesions. Liver vessels were patent as expected. Mild gallbladder wall thickening seen at 6 mm but no pericholecystic fluid and an echogenic gallstone was seen.  They suspect that this could be related due to chronic liver disease and less likely due to gallbladder inflammation.  They recommended for us to correlate with clinical history such as if you have symptoms or not of any pain. Common bile duct was normal at 4 mm. Right kidney 12.4 cm with no hydronephrosis. Spleen is mildly enlarged at 14.7 cm and you have trace ascites in the perisplenic space.  Very important that you maintain a low-salt diet with this history. Splenic vessels were patent as expected. In summary, the liver is coarsened echotexture but with no lesions and consistent with chronic liver disease/cirrhosis etiology.  Nonspecific gallbladder wall thickening that could be related due to chronic liver disease or less likely due to gallbladder issues.  Please let us know if you having any symptoms.   Doug 3 labs show sugar up at 126 and possibly not fasting this day? BUN of 13 and creatinine 0.78 both normal. Sodium 141 potassium 4.1 albumin 4.5 bilirubin 0.9 alk phos 96 with AST up a little at 52 and ALT of 34 and previously AST 33 and ALT 22. Sometimes patients with fatty liver near the holidays go up a little underweight or become less active and that can usually cause this to worsen in the winter months. WBC 7.3 hemoglobin a little low but higher at 12.7 from 11.5 previously. MCV was low normal at 79. The trend is getting a little lower which makes me worry if you are getting close to being iron deficient? Platelet count was low at 58 from previous 66. Neutrophils normal at 1.9 but lymphocytes up at 4.4. Previously they have been 2.9 and prior to that 3.1. INR 1.2 which is normal range still. Meld low at 8 and meld na 8.   Dec 28 2023 : partially visualized pancreas unremarkable. Coarsened echotexture of the liver parenchyma with surface nodularity and compatible with cirrhosis. No focal liver mass. Mild thickening of the gallbladder wall without pericholecystic fluid and similar to prior study. Some gallstones seen up to 6mm. Common bile duct 4mm and normal. Right kidney 12.4cm and no hydronephrosis. Spleen 14.8cm. Trace perisplenic ascites seen. Need you to stay on low salt diet with this issue. Doppler vessels patent. In summary, cirrhosis and portal hypertension changes seen in the form of splenomegaly and trace ascites. No focal mass.  Gallstones seen without acute cholecystitits and apparent mild gallbaldder wall thickening and no pericholecystic fluid favoring due to chronic liver disease.  Doppler vessels patent but hepatic aretery slightly decreased in resistive index and nonspecific.  Need liver labs to correlate with this.  Pt says he is on low salt diet. He lives near [place] so we can do the u.s in [employer] and do visit to review that.  June 9 labs show glucose of 84 which is normal this time. Previously had been elevated at 118 on a comparison labs that they gave us from 2 years ago. Please share with primary provider to be aware of. BUN of 14 creatinine slightly low at 0.71 sodium 140 potassium 4.1. Chloride slightly up at 107 and may reflect hydrational state that day. Calcium 8.7 albumin 4.0 bilirubin 0.7 alk phos 87 with an AST of 33 and ALT of 22 which appeared to be lower than the comparison labs that they gave on the current sample but again from 2 years ago. Ideal ALT is less than 35. WBC 5.7 hemoglobin was low though at 11.5 hematocrit low at 35.5 with a previous comparison values from 2 years ago being normal. Platelet count was slightly higher now at 66 from previous comparison level of 63. MCV normal at 83 and neutrophils 2.1 and lymphocytes 2.9. INR normal at 1.2. From the note, we saw some more recent labs that you did with Dr. Hobbs that it showed that your hemoglobin likewise was higher at 14.7 and it is clearly lower now platelets were 57,000 then. Liver labs were AST of 36 and ALT of 25 so those are very close to these.  The main difference appears to be that your hemoglobin is a little lower and I would asked that you follow-up with Dr. Jessica Hobbs your hematologist regarding this issue.  Dr Hobbs to see May and told all is ok.  May 30 2023 ultrasound shows liver is mildly increased/coarsened in echotexture but with no suspicious lesions. Common bile duct was normal at 3.1 mm. A likely small 6 mm calculus/stone was seen in the gallbladder. They felt that the gallbladder wall measurement of 4.2 mm was a technical error estimation. No pericholecystic fluid was seen. Pancreatic tail was not seen due to gas and that the image pancreas was otherwise grossly unremarkable. Right kidney was 12.3 cm with no stones or hydronephrosis. Spleen was enlarged at 14 cm with no splenic lesions. No ascites was seen. Liver vessels were patent as expected. Splenic vessels were patent. In summary, they felt that you had an increase/coarsened hepatic echotexture in keeping with your history of fatty liver and early chronic parenchymal liver disease. No suspicious lesions were seen. Probable 6 mm gallstone seen with no evidence of acute cholecystitis that they could clearly see. Splenomegaly was seen as well. Of note, on your previous ultrasound last year you had a 6 mm stone also seen in the gallbladder and your spleen measured 14.2 cm then.  Last labs he did from Dr Hobbs were 2m ago.  2023 labs done by Dr. Jessica Hobbs show AFP normal at 2.6.  sodium 137 potassium 3.7 chloride 105 CO2 25 BUN 17 creatinine 0.73 glucose 230 albumin 4.2 alk phos 84 AST 36 ALT 25 bilirubin 1.2 elevated folate 16.9 and ferritin 37 B12 447 iron sat 19% WBC 5.2 hemoglobin 14.7 plate count 57 MCV 83.6  He sees her in 3m and so she will see soon and do the full labs then,  Jan 2023 ct Narrative & Impression EXAM: CT ANGIO CHEST W IV CONTRAST, CT ANGIO ABDOMEN PELVIS W IV CONTRAST  CLINICAL INDICATION: TAVR/progress trial.  TECHNIQUE: Non-ECG-gated CT images of the chest, abdomen and pelvis were obtained with nonionic intravenous contrast according to CT angiogram protocol. Advanced offline 3D post-processing was performed utilizing a dedicated 3D workstation. There were no immediate complications reported. If applicable, point-of-care testing?was approved following departmental protocol.  COMPARISON: 1/28/2019.  FINDINGS:  VASCULAR: 3 leaflet aortic valve however, there is leaflet thickening and calcification and appearances are compatible with at least moderate aortic stenosis. Normal caliber aortic root. No coronary artery anomaly. Moderately severe coronary artery calcification. Normal caliber thoracic aorta with no significant atherosclerotic calcification or atheromatous plaque. No significant tortuosity. Coronary artery bypass grafting with left internal mammary and at least one saphenous vein bypass grafts. 3 vessel aortic arch. The branch vessels are normal caliber patent. Normal caliber abdominal aorta with mild gross atherosclerotic calcification. No significant plaque. No significant tortuosity. Patent celiac axis, SMA, bilateral renal arteries and accessory right renal artery and GERALDO. Normal caliber iliac and common femoral arteries with moderate atherosclerotic calcification. Focal calcified plaque with in the mid right external iliac artery with a luminal diameter minimally measuring 7 mm. Calcified plaque within the left common iliac artery. The luminal diameter minimally measures 6 mm.  CHEST: The lower neck and thoracic inlet are unremarkable.  Heart size is within normal limits. Normal caliber central pulmonary arteries. Small patent foramen ovale. Minor mitral valve annular calcification. No pericardial effusion or thickening.  There are a few subcentimeter lymph nodes. There are borderline enlarged axillary lymph nodes with the largest axillary lymph node on the right measuring 18 mm (in maximum short axis diameter) and on the left 12 mm. Lymph node enlargement is new compared to the prior study. Appearances may represent reactive lymph nodes. Is there a history of lymphoma proliferative disorder such as CLL/SLL?  The central airways are patent. No new or growing nodules calcified old granulomatous disease. No pleural effusion or thickening.  ABDOMEN/PELVIS: No focal abnormality within the liver. No biliary duct dilatation. Tiny calcified calculus within the gallbladder. No cholecystitis. Splenomegaly. The spleen measures 15 cm, 12 cm on the previous study.  The adrenal glands are unremarkable. Normal size kidneys with good cortical thickness and symmetric enhancement.  The pancreas is unremarkable. Minor diverticulosis without diverticulitis. The bowel and mesentery are otherwise on remarkable.  Unremarkable urinary bladder. Normal size prostate.  No free fluid. No lymph node enlargement.  BONES/SOFT TISSUES: Sternotomy wires. Degenerative change. Osteophytes. Screws within the lumbar spine. No aggressive osseous lesion.   IMPRESSION: 3 leaflet aortic valve however, there is leaflet thickening and calcification and appearances are compatible with at least moderate aortic stenosis.  Moderately severe coronary artery calcification. Coronary artery bypass grafting with left internal mammary and at least one saphenous vein bypass grafts.  Normal caliber thoracic aorta with no significant atherosclerotic calcification or atheromatous plaque. No significant tortuosity.  Normal caliber abdominal aorta with mild gross atherosclerotic calcification. No significant plaque. No significant tortuosity.  Normal caliber iliac and common femoral arteries with moderate atherosclerotic calcification. Focal calcified plaque within the mid right external iliac artery with a luminal diameter minimally measuring 7 mm. Calcified plaque within the left common iliac artery. The luminal diameter minimally measures 6 mm.  New bilateral axillary lymph node enlargement is an interval enlargement of the spleen. Appearances are compatible with a lymphoproliferative disorder, specifically CLL/SLL.   Jan 2023 Dr Sesay visit: Hi risk for SAVR due to age, comorbidity, redo Agree with evaluation for Progress moderate AS trial Lio Sesay MD  He says since then June 13 and maybe doing valve surgery then.  March 20 egd Findings: Grade II varices were found in the middle third of the esophagus and in the lower third of the esophagus. They were medium in size. Six bands were successfully placed with complete eradication, resulting in deflation of varices. There was no bleeding during and at the end of the procedure. Estimated blood loss was minimal. The stomach was normal. The examined duodenum was normal.  Impression: - Grade II esophageal varices. Completely eradicated. Banded. - Normal stomach. - Normal examined duodenum. - No specimens collected. Recommendation: - Perform a colonoscopy today. - Repeat upper endoscopy in 3 months for retreatment. He does the scopes with Dr Tomlin to for the follow up.  March 20 colon: Findings: The perianal and digital rectal examinations were normal. The terminal ileum appeared normal. The colon (entire examined portion) appeared normal. Non-bleeding internal hemorrhoids were found during retroflexion. The hemorrhoids were mild.  Impression: - The examined portion of the ileum was normal. - The entire examined colon is normal. - Non-bleeding internal hemorrhoids. - No specimens collected. Recommendation: - Discharge patient to home (via wheelchair). - Repeat colonoscopy in 5 years for surveillance. - Return to GI office PRN.  He was last seen on February 15, 2023 by Jyoti Diehl PA-C to talk about EGD surveillance. Last EGD had been in 2021 and it showed grade 1 varices in patchy mildly erythematous mucosa without bleeding in the gastric antrum. EGD and colonoscopy were top be set up but apparently they were set up for March 27 the patient called on February 17 to do it sooner elsewhere.  October 2022: [facility] labs sent to me. Sodium was 138, potassium 3.9 chloride 105 CO2 23 BUN of 14 creatinine 0.66 calcium 9.2 glucose elevated at 112. Albumin 4.3 alkaline phosphatase 84 AST 37 ALT 31 with ideal ALT less than 35. Bilirubin elevated 1.4 but not fractionated. INR normal at 1.19. Neutrophils 2.39 lymphocytes 3.13. White blood cell count 6.5 hemoglobin elevated 16.2 hematocrit elevated 50.4 platelet count 60,000. MCV 85.3 please share with primary provider. February labs showed hemoglobin elevated at 16.4 so very similar to this and now slightly lower. AST was 38 and ALT 37 last time. Please share labs with primary provider. Meld 10 and meld na 10 so remaining low.  Oct 25: u.s: pancreas normal in size. Liver is heterogeneous in echogenicity. No definite focal hepatic abnormality. Normal doppler flow. Vessels patent. Gallbladder showed a 6mm stone in the neck of the gallbladder. Common bile duct 3-4 mm and right kidney 12cm. Spleen 14.2cm enlarged. Vessels patent. Overall, heterogeneous liver. 6mm stone seen in the neck of gallbladder. Splenomegaly. Patent vessels. On the prior u.s 4mm stone size was mentioned.  Mri: april 11 2022: liver with chronic liver disease and lobar distribution and nodular contour. No hcc. Vessel patent. varices near esoph and stomach and spleen. spleen 14.5cm. Pancreas normal. Enalrged ln seen and mildly inc. Comaptible with CLL.  April 22 ultrasound shows the liver to have a cirrhotic morphology. It appears to be heterogeneous in echotexture with mild lobulation of the contour. Common bile duct measured 4 mm. There was an echogenic focus in the dependent portion of the gallbladder without shadowing that they felt could be a small stone. No ascites was seen. Pancreas were visualized appeared unremarkable. Right kidney measured 11.5 cm and had normal echogenicity and no hydronephrosis. Liver vessels were patent. Overall they felt that you had a cirrhotic appearing liver with patent vessels and mild splenomegaly. Gallstones seen but without mention of inflammation. We plan to redo this in 6 months  Hematology is watching him for now with Dr Hobbs and he is to see her next month.  feb 11 2022 na 138 and k 4 and cl 104 and co2 27 and rosa 16 and cr 0.82 and alb 4.4 and tb 1.1 and alk 77 and ast 38 and alt 37 and ldh 228. wbc 7.4 hg 16.4 plat 59. No inr.  Reports continued better control with DM since starting Farxiga 10mg A1c running at approximately still at 6-7% per pt and this is unchanged in 2023. Does not require regular insulin just prn and off the metformin.  Weight stable and he is weighing 147 at home and 152 here and prior 155 and at 148.  HLD controlled with lipitor 20 mg.  He does consume alcohol 1-2 drink per year and was encouraged to stop alcohol entirely if possible.  Received COVID Vaccine x 4 (moderna).  Did egd in jan 2021 and grade 1 varices noted. Per dr. tomlin note recommend repeat in july 2022 and he has not done this. Dr Tomlin and he has to make it.  US 4/27/21- needs repeat in 6 months (Oct 2021). Denies abdominal distention, weight gain, confusion, GI bleeding, fevers/chills, abdominal pain.  9/22/21- glucose 118 elevated, creatinine 0.79, tbili 0.7, alk phos 116, ast 47 (prev 53), alt 39 (prev 50)- ideal is less than 35, wbc 8.7, rbc 5.95 (please show primary MD), platelets 63 (prev 86), inr 1.1, sodium 144, MELD 7, MELD-Na 7  4/23/21 tb 1.1 alt 49 alp 84 ast 45  April 27 ultrasound shows the liver to be coarsened with mild nodularity but no discrete liver lesion. Gallstones are seen but without any gallbladder wall thickening or pericholecystic fluid. Common bile duct is 2.4 mm. Pancreas appeared normal to them. Right kidney appeared to be normal in echogenicity with no renal mass. Spleen was 12.8 cm. Liver vessels were patent. Overall the liver appeared to be coarsened with no focal lesion. Spleen was upper normal. I wanted to clarify the size of your right kidney and will await that correction. They corrected the right kidney size to 11.5cm.  RECAP: he did a recent u/s-cirrhosis noted on imaging. no lesions. past CT * Final Report *  Reason For Exam Splenomegaly; h/o CLL;Splenomegaly;Cirrhosis of liver  REPORT EXAM: CT Abdomen w/ IV Contrast  CLINICAL INDICATION: Splenomegaly; h/o CLL;Splenomegaly;Cirrhosis of liver.  TECHNIQUE: Following administration of non-ionic IV contrast, postcontrast images through the abdomen were obtained. If applicable, point-of-care testing was approved following departmental protocol. [id number]  COMPARISON: 1/28/2019  FINDINGS: Lower Thorax: The visualized lung bases are clear. Punctate pleural calcification on the left posteriorly. Coronary artery calcification.  Liver: Hepatic steatosis with a nodular surface contour and fissural widening, unchanged.  Gallbladder/Biliary Tree: Cholelithiasis. No biliary ductal dilatation or gallbladder wall thickening.  Spleen: The spleen measures 13.1 cm in length, previously 11.6 cm at the same level.  Pancreas: Unremarkable.  Adrenal Glands: Unremarkable.  Kidneys/Ureters: Symmetric renal enhancement without obstruction.  Gastrointestinal: No bowel obstruction. Mild, nonspecific wall thickening in the gastric antral pyloric region.  Lymph Nodes: Multiple retroperitoneal lymph nodes are slightly larger. One left para-aortic node (5:33) measures 13 mm short axis, previously 9 mm. One aortocaval node (5:36) measures 9 mm short axis, previously 5 mm. Additional nodes are slightly larger.  Vessels: Moderate atherosclerosis.  Peritoneum/Retroperitoneum: No free fluid.  Bones/Soft Tissues: Osteopenia with degenerative and postsurgical changes in the spine.  IMPRESSION:  1. Slight increase in the size of the spleen. 2. Slight increased size of multiple retroperitoneal lymph nodes. 3. Nonspecific thickening of the gastric antral pylorus wall. 4. Hepatic steatosis with a nodular surface contour and fissural widening consistent with chronic liver disease.    9/15/20 na 141 k 4.6 gluc 109 cr 0.85 tb 1.2 alt 36 ast 39 alp 84  Plan: 1. He was approved and able to get his ful script of the vemlidy/TAF 25mg and that is smaller and easier to swallow than the other and less bone and renal risk and important as on diuretics for the ascites and taking CLL tx. 2. Ptl liver labs stable to dropping and follow that.  3. Pt was to see Dr Tomlin for the egd for the 3m relook and March 28. For possible banding.  4. Pt ascites continues to be better and staying down. 5. Pt may need to do some hep b labs occ pending course. 6. Pt will call if issues.    Duration of the visit was 0  minutes with 20 minutes of chart prep and looking up [place] info and  loading to ecw of his outpt labs and scans and 30 minutes for the face to face today with time spent reviewing their prior and recent records and labs and discussing their current status and future plans for care for the patient and his family.

## 2025-04-16 ENCOUNTER — OFFICE VISIT (OUTPATIENT)
Dept: URBAN - METROPOLITAN AREA MEDICAL CENTER 28 | Facility: MEDICAL CENTER | Age: 75
End: 2025-04-16

## 2025-04-19 ENCOUNTER — ERX REFILL RESPONSE (OUTPATIENT)
Dept: URBAN - METROPOLITAN AREA CLINIC 86 | Facility: CLINIC | Age: 75
End: 2025-04-19

## 2025-04-19 RX ORDER — SPIRONOLACTONE 50 MG/1
TAKE 1 TABLET BY MOUTH DAILY TABLET ORAL
Qty: 30 TABLET | Refills: 0

## 2025-04-19 RX ORDER — FUROSEMIDE 20 MG/1
TAKE 1 TABLET BY MOUTH DAILY TABLET ORAL
Qty: 30 TABLET | Refills: 0

## 2025-04-22 ENCOUNTER — TELEPHONE ENCOUNTER (OUTPATIENT)
Dept: URBAN - METROPOLITAN AREA CLINIC 98 | Facility: CLINIC | Age: 75
End: 2025-04-22

## 2025-04-30 ENCOUNTER — OFFICE VISIT (OUTPATIENT)
Dept: URBAN - METROPOLITAN AREA MEDICAL CENTER 28 | Facility: MEDICAL CENTER | Age: 75
End: 2025-04-30

## 2025-05-19 ENCOUNTER — ERX REFILL RESPONSE (OUTPATIENT)
Dept: URBAN - METROPOLITAN AREA CLINIC 86 | Facility: CLINIC | Age: 75
End: 2025-05-19

## 2025-05-19 RX ORDER — FUROSEMIDE 20 MG/1
TAKE 1 TABLET BY MOUTH DAILY TABLET ORAL
Qty: 30 TABLET | Refills: 0

## 2025-05-19 RX ORDER — SPIRONOLACTONE 50 MG/1
TAKE 1 TABLET BY MOUTH DAILY TABLET ORAL
Qty: 30 TABLET | Refills: 0

## 2025-05-21 ENCOUNTER — OFFICE VISIT (OUTPATIENT)
Dept: URBAN - METROPOLITAN AREA CLINIC 86 | Facility: CLINIC | Age: 75
End: 2025-05-21
Payer: COMMERCIAL

## 2025-05-21 ENCOUNTER — TELEPHONE ENCOUNTER (OUTPATIENT)
Dept: URBAN - METROPOLITAN AREA CLINIC 86 | Facility: CLINIC | Age: 75
End: 2025-05-21

## 2025-05-21 DIAGNOSIS — R93.3 ABNORMAL CT SCAN, STOMACH: ICD-10-CM

## 2025-05-21 DIAGNOSIS — E11.9 DIABETES: ICD-10-CM

## 2025-05-21 DIAGNOSIS — R74.8 ABNORMAL LIVER ENZYMES: ICD-10-CM

## 2025-05-21 DIAGNOSIS — Z79.899 HIGH RISK MEDICATION USE: ICD-10-CM

## 2025-05-21 DIAGNOSIS — R93.2 ABNORMAL GALLBLADDER ULTRASOUND: ICD-10-CM

## 2025-05-21 DIAGNOSIS — K57.90 DIVERTICULOSIS: ICD-10-CM

## 2025-05-21 DIAGNOSIS — I85.00 ESOPHAGEAL VARICES WITHOUT BLEEDING, UNSPECIFIED ESOPHAGEAL VARICES TYPE: ICD-10-CM

## 2025-05-21 DIAGNOSIS — M67.40 GANGLION CYST: ICD-10-CM

## 2025-05-21 DIAGNOSIS — R18.8 ASCITES: ICD-10-CM

## 2025-05-21 DIAGNOSIS — E78.5 HYPERLIPIDEMIA: ICD-10-CM

## 2025-05-21 DIAGNOSIS — K76.0 FATTY LIVER: ICD-10-CM

## 2025-05-21 DIAGNOSIS — D69.6 THROMBOCYTOPENIA: ICD-10-CM

## 2025-05-21 DIAGNOSIS — Z71.89 VACCINE COUNSELING: ICD-10-CM

## 2025-05-21 DIAGNOSIS — I10 HYPERTENSION: ICD-10-CM

## 2025-05-21 DIAGNOSIS — C91.10 CLL (CHRONIC LYMPHOCYTIC LEUKEMIA): ICD-10-CM

## 2025-05-21 DIAGNOSIS — K31.89 INTESTINAL METAPLASIA OF GASTRIC CARDIA: ICD-10-CM

## 2025-05-21 DIAGNOSIS — Z12.11 SCREEN FOR COLON CANCER: ICD-10-CM

## 2025-05-21 DIAGNOSIS — E55.9 VITAMIN D DEFICIENCY: ICD-10-CM

## 2025-05-21 DIAGNOSIS — I85.10 SECONDARY ESOPHAGEAL VARICES WITHOUT BLEEDING: ICD-10-CM

## 2025-05-21 DIAGNOSIS — Z87.09 HX OF INTRINSIC ASTHMA: ICD-10-CM

## 2025-05-21 DIAGNOSIS — K74.69 OTHER CIRRHOSIS OF LIVER: ICD-10-CM

## 2025-05-21 DIAGNOSIS — R76.8 HEPATITIS B CORE ANTIBODY POSITIVE: ICD-10-CM

## 2025-05-21 PROCEDURE — 99215 OFFICE O/P EST HI 40 MIN: CPT

## 2025-05-21 RX ORDER — ASPIRIN 81 MG/1
1 TABLET TABLET, COATED ORAL ONCE A DAY
Status: ACTIVE | COMMUNITY

## 2025-05-21 RX ORDER — FUROSEMIDE 20 MG/1
TAKE 1 TABLET BY MOUTH DAILY TABLET ORAL
Qty: 30 TABLET | Refills: 0 | Status: ACTIVE | COMMUNITY

## 2025-05-21 RX ORDER — CALCIUM CARBONATE 500(1250)
2 TABLETS TABLET ORAL ONCE A DAY
Status: ACTIVE | COMMUNITY

## 2025-05-21 RX ORDER — FUROSEMIDE 20 MG/1
TAKE 1 TABLET BY MOUTH DAILY TABLET ORAL
Qty: 90 | Refills: 0

## 2025-05-21 RX ORDER — TENOFOVIR ALAFENAMIDE 25 MG/1
1 TABLET WITH FOOD TABLET ORAL ONCE A DAY
Qty: 30 TABLET | Refills: 3 | OUTPATIENT
Start: 2025-05-20 | End: 2025-09-17

## 2025-05-21 RX ORDER — METOPROLOL TARTRATE 25 MG/1
TAKE 0.5 TABLET BY ORAL ROUTE ONCE DAILY TABLET, FILM COATED ORAL 1
Refills: 0 | Status: ACTIVE | COMMUNITY
Start: 1900-01-01

## 2025-05-21 RX ORDER — DAPAGLIFLOZIN 10 MG/1
1 TABLET TABLET, FILM COATED ORAL ONCE A DAY
Status: ACTIVE | COMMUNITY

## 2025-05-21 RX ORDER — TENOFOVIR ALAFENAMIDE 25 MG/1
1 TABLET WITH FOOD TABLET ORAL ONCE A DAY
Qty: 30 TABLET | Refills: 3 | Status: ACTIVE | COMMUNITY
Start: 2025-04-13 | End: 2025-08-11

## 2025-05-21 RX ORDER — INSULIN LISPRO 100 [IU]/ML
(BG-100)/40 = #UNITS FOR BLOOG GLUCOSE GREATER THAN 180 SUBCUTANEOUSLY BEFORE BREAKFAST, LUNCH, AND DINNER INJECTION, SOLUTION INTRAVENOUS; SUBCUTANEOUS
Qty: 10 | Refills: 0 | Status: ACTIVE | COMMUNITY

## 2025-05-21 RX ORDER — SPIRONOLACTONE 50 MG/1
1 TABLET TABLET, FILM COATED ORAL ONCE A DAY
Qty: 30 | Refills: 1 | OUTPATIENT
Start: 2025-05-20

## 2025-05-21 RX ORDER — TENOFOVIR ALAFENAMIDE 25 MG/1
1 TABLET WITH FOOD TABLET ORAL ONCE A DAY
Qty: 90 | Refills: 0
Start: 2025-05-20 | End: 2025-08-19

## 2025-05-21 RX ORDER — SYRINGE-NEEDLE,INSULIN,0.5 ML 30 G X1/2"
USE 3 SYRINGES A DAY TO INJECT INSULIN SYRINGE, EMPTY DISPOSABLE MISCELLANEOUS
Qty: 100 | Refills: 0 | Status: ACTIVE | COMMUNITY

## 2025-05-21 RX ORDER — SPIRONOLACTONE 50 MG/1
TAKE 1 TABLET BY MOUTH DAILY TABLET ORAL
Qty: 90 | Refills: 0

## 2025-05-21 RX ORDER — ATORVASTATIN CALCIUM 20 MG/1
1 TABLET TABLET, FILM COATED ORAL ONCE A DAY
Status: ACTIVE | COMMUNITY

## 2025-05-21 RX ORDER — SPIRONOLACTONE 50 MG/1
TAKE 1 TABLET BY MOUTH DAILY TABLET ORAL
Qty: 30 TABLET | Refills: 0 | Status: ACTIVE | COMMUNITY

## 2025-05-21 NOTE — HPI-TODAY'S VISIT:
Pt is a 74 year male, after a previous visit on March 2025 for an evaluation for elevated liver enzymes and and fatty liver and cirrhosis and CLL and hep b core pos and recent worsening ascites and started acalabrutinin for CLL tx and on vemlidy to prevent reaction.  A copy of this document will be sent to the referring provider.  He has lost some weight on the tx. Maybe 5lbs. Less fluid.   Need labs to be sure vemlidy no issues.  No side effects.  May 12 2025 na 135 and k 4.2 and cl 103 and co2 25 and ca 8.9 and bun 26 and cr 0.85 and glu 185 and tp 6.1 and alb 3.7 and alk 128 and ast 52 and alt 26 and  tb 1.3 ldh 260probnp 197 and wbc 2.5 and hg 11.3 ansd hct 35.6 and mcv 82.6 and platelets 52 and neutrophils 1.08 and lymphs 0.79.   Talia WEAVER saw from heme onc and CLL acalabrutinib and and some distension and ln in groin seen and  tooth issue and saw dentist and planning root canal and LN reduced and spleen smaller and did have april 14 low platelers abd they held for 2 weeks and resumed 100mg bid and following  Need the hep b tests.  March 24 labs show wbc 4.3  and hg 11.7 and hct 36.8 and mcv 88 platelets 32  and prior platelets 32. Neutrophils 0.6 and low and prior 1.4. Lymphs 3.1. Glucose 116 elevated and bun 23 and cr 0.81 and na 137 and k 4.4 and cl 102 and co2 22 and ca 8.7 and alb 4.0 and tb 1.4 little up. Alk 143 elevated and prior 113. Ast 59 and alt 32 and prior ast 51 and alt 30. HBV dna not detected. Not clear if liver labs little  up due to CLL medicine as less likely from the HBV medicine.   March 11 labs showed WBC normal at 5.5 but hemoglobin a little bit lower at 11.4 from 11.6 in comparison in April of last year. RBC count a little lower also at 4.02 but MCV normal at 89 and platelet count lower at 32,000.  Neutrophils were normal at 1.4 but lymphocytes a little low at 3.3.  Glucose was 87 BUN of 20 current 0.79 sodium 139 potassium 4.3 calcium 8.7 albumin 4.0 bilirubin 1.2 and alk phos normal at 113. AST a little up to 51 and ALT of 30.  Hep B DNA remains not detected so the medicine is working to keep that under control.  More recent comparison labs from February 24, 2025 had shown that your hemoglobin was 12.6 and platelet count was 36,000 so these are more similar to those and your previous AST had been 40 and ALT of 22 show that these labs are slightly higher. Very important  to follow these trends overall over time.    Feb 24 2025  na 138 and k 4.1 and cl 104 and co2 27 and ca 9.0 and bun 17 and cr 0.93 and glucose 111 and total protein 6.7 and alb 4.0 and alk 100 and ast 40 and alt 22 and tb 1.2  and wbc 8.6 and hg 12.6 and hct 28.5 and mcv 85.6 and platelets 36 neutrophisl 1.98 and lymphs 5.53  prior ast 45 and tb 1.5.   Pt is coming in for the acalabrutinib induced potential risk to reactivate hep b and so he is to start the vemilidy and start the appeal for this and start on samples.   Dr Calderón will look at med options if platelets stay low.  Acalabrutinib:  In open label clinical trials of acalabrutinib in patients with CLL and mantle cell lymphoma, serum aminotransferase elevations occurred in 19% to 23% of patients during therapy and stella to above 5 times ULN in 2% to 3%. These elevations were transient and resolved spontaneously but occasionally led to early drug discontinuation. Among the 610 patients treated with acalabrutinib in pre-registration trials, there were no instances of clinically apparent liver injury attributed to its use, but there was a single instance of acute liver failure and death due to reactivation of hepatitis B. Similar cases of reactivation have been reported with ibrutinib, another small molecule inhibitor of Bruton's tyrosine kinase. Experience with acalabrutinib has been limited and the frequency of clinically apparent liver injury and reactivation of hepatitis B are not known. The2 LiverTox majority of cases have occurred in patients taking multiple immunosuppressive agents and not just acalabrutinib alone. Likelihood score: D (possible rare cause of reactivation of hepatitis B).  Epic message: Dr Calderón, she was considering this. Since he is hep b core positive and she and I are  aware of the risk that raises with this med he needed to be started. Per livertox: In open label clinical trials of acalabrutinib in patients with CLL and mantle cell lymphoma, serum aminotransferase elevations occurred in 19% to 23% of patients during therapy and stella to above 5 times ULN in 2% to 3%. These elevations were transient and resolved spontaneously but occasionally led to early drug discontinuation. Among the 610 patients treated with acalabrutinib in pre-registration trials, there were no instances of clinically apparent liver injury attributed to its use, but there was a single instance of acute liver failure and death due to reactivation of hepatitis B. Similar cases of reactivation have been reported with ibrutinib, another small molecule inhibitor of Bruton's tyrosine kinase. Experience with acalabrutinib has been limited and the frequency of clinically apparent liver injury and reactivation of hepatitis B are not known. The majority of cases have occurred in patients taking multiple immunosuppressive agents and not just acalabrutinib alone. Likelihood score: D (possible rare cause of reactivation of hepatitis B).  Entecavir is allowwed for hep b tx but it requires fasting 2 pre and 2 hours and that is hard to do.  Tenofovir is also possible TDF 300mg and TAF 25mg.  Taf is smaller and easier to swallow than tdf and less bone and renal risk and he is on a tx cll and older age and that i s a risk so Taf is safer for him and appeal for that.  February 5 labs show us that your glucose was 105 slightly up and unclear if you were fasting? BUN of 18 creatinine 0.88 sodium 139 potassium 4.7 calcium 8.8 albumin 4.2 bilirubin 1.1 which was a little lower. Alk phos slightly higher at 161 from 159. AST slightly higher at 60 from 56 and ALT slightly lower at 25 from 26. These labs are only done 2 days after the other set of labs.  Re ascites taking water pills. Not needing more drainage.   Feb 3 labs show an INR normal at 1.2 but prothrombin time slightly up at 13.5 seconds. Sugar was slightly elevated to 100 and unclear if you were fasting? BUN was normal at 22 creatinine 0.91 sodium 140 potassium 4.2 calcium 9.2 albumin 4.2. Bilirubin was 1.3 alk phos 159 AST of 56 and ALT of 26 with ideal ALT less than 35. WBC was 5.3 hemoglobin 14.2 platelet count was low at 61 MCV 87 with neutrophils low at 0.9 lymphocytes 3.2.  The prior labs that we had on you and were actually before your diuretics when you on December 20 had labs and noted to have a BUN of 13 and a creatinine 0.93 so these are fairly close to what those labs were and still within the normal range so we can follow you on that. Your sodium had been previously 143 and potassium 4.0 so those remained stable as well. The albumin was 3.7 bilirubin 1.3 AST 55 ALT of 24 so these again very similar. Please let us know how you are doing as I did see that you require that one paracentesis so far and need toknow how your weight checks are running? We need you to track your weight so we can see if the diuretics are slowing the ascites down or if they are not we need to try to make an increase in the doses for the diuretics.  In addition we did receive a note from your [facility] paracentesis cytology from January 29 where they said that the fluid was sent to look for any malignant cells and no malignant cells were seen.  Wt 149 to 127 so lost 22 pounds.  watching the labs.  In dec, heme  felt that despite full stains that they did to try to look for this lymphoma they saw no evidence of any lymphoma in the fluid detected cells.  Jan 2025 ct: Lower Thorax: Please see separately dictated report of the chest from the same day for findings above the diaphragm.    Liver: Cirrhotic liver morphology. No focal hepatic lesion.    Gallbladder/Biliary Tree: Calcified gallstones without acute cholecystitis. No biliary ductal dilatation.    Spleen: Enlarged measuring 15.7 cm.    Pancreas: Normal.    Adrenal Glands: Normal.    Kidneys/Ureters: No hydronephrosis.    Gastrointestinal: No bowel obstruction.  Nondilated appendix.     Bladder: Partially decompressed.    Prostate/Seminal Vesicles: Normal.    Lymph Nodes: Diffuse multistation lymphadenopathy including periportal, retroperitoneal, iliac chain and pelvic lymph nodes interface with adjacent vascular and intra-abdominal organs without significant mass effect. Multistation lymphadenopathy appears grossly unchanged when compared to most recent MRI comparison, given differences in technique. The following examples are provided for reference:       *  Pericaval node measures 2.2 x 1.8 cm (7; 117).   *  Periaortic retroperitoneal daniel conglomerate which surrounds the infrarenal aorta measures 8.5 x 5.5 cm (7; 147).   *  Right iliac chain node measures 3.5 x 2.2 cm and left iliac chain measures 3.0 x 2.2 cm (7:258).   *  Right pelvic node measures 2.8 x 1.4 cm (7; 268) and left pelvic node measures 2.4 x 1.1 cm (7:270).       Vessels: Normal caliber aorta. Moderate aortobiiliac atherosclerosis. Patent celiac and superior mesenteric artery. Patent main portal vein. Perigastric and perisplenic varices.    Peritoneum/Retroperitoneum: Large volume abdominopelvic ascites and reactive mesenteric congestion/edema.    Bones/Soft Tissues: Diffuse osseus demineralization. Multilevel degenerative changes of spine. Posterior spinal fusion of L4-5, without hardware loosening. No acute osseous lesion. Small left inguinal hernia containing ascites.    IMPRESSION:   1.  Diffuse multistation lymphadenopathy including periportal, retroperitoneal chain and pelvic lymph nodes grossly unchanged when compared to most recent MRI study, allowing for differences in modality.   2.  Cirrhotic liver morphology and sequela of portal venous hypertension. No definite suspicious hepatic lesion.   3.  Large volume abdominopelvic ascites with mildly edematous loops of large bowel, may represent underlying portal hypertensive colopathy.   4.  Refer to separately dictated same-day CT chest for findings above the diaphragm.     Still on diuretics and no need retap.    January 29 2025 tab led to a 3.3 L paracentesis on you. Nucleated cell total count not given but neutrophils 5% and lymphocytes 59% and monocytes 34% and mesothelial cells 2% so probably not infected but pending that to be sure. There are appear fluid also sent off for cytology on the ascites.  Prior visit He says that he is feeling like more fluid issues since the last visit. Asked if on a low salt diet and he says he is on that. Asked re weight and he is weighing and 142 and that is 10 pounds more than usual.  Need to start on water pills and we can order.  Very impo look to look at salt of food and uses Mrs Dash.  January 7 ultrasound shows liver echogenicity appears coarsened and increased with no lesions. Portal vein was patent/open as expected. Gallstone seen in gallbladder but no size given this time. Gallbladder wall thickened 6 mm. Common bile duct was 4 mm. Small amount of ascites was seen so important that you stay on a low-salt diet. Pancreas were visualized appeared normal. Head and tail not well-seen. Right kidney measured 11.8 cm with no hydronephrosis. Spleen enlarged at 16.2 cm. In summary, coarsened echotexture seen consistent with your history of cirrhosis and no focal lesions. Small gallstone seen with thickened gallbladder wall at 6 mm. Small ascites. If clinical symptoms of concern consider HIDA was recommended. Splenomegaly was seen.  Last labs in dec and we would want to do labs when weather allows.  As you recall you had am mri scan done in October 2 2024 at [facility] for your oncologist/hematologist. The liver was cirrhotic and nodular then with some signs of fibrosis seen as well. They did mention you had some perfusional changes which typically they did recommend to be assessed intermittently. No suspicious lesions seen. The gallbladder had a single gallstone with diffuse submucosal edema and wall thickening which they thought could be third spacing of volume overload and no bile duct dilation. Spleen was 16.5 cm and which was very similar and previously had been 14.5 cm. Back in October of last year your ultrasound showed gallbladder wall thickening at 6 mm and a 6 mm gallstone as well.Small volume ascites has been seen prior also.  Patient saw oncology ANAYELI ubigrate on December 23, 2024: Chronic Lymphocytic Leukemia (CLL) Recent MRI showed significant increase in the size of numerous retroperitoneal lymph nodes above and below the diaphragm with increase in the degree of splenomegaly concerning for worsening lymphoproliferative process. No current CLL therapy. Patient reports feeling of fullness in abdomen and difficulty breathing when sitting. -Expedite appointment with hematologist (Dr. Haywood) to discuss potential need for CLL therapy.  Labs December 20 show ferritin level low at 12 sodium 143 potassium 4.0 chloride 108 CO2 23 calcium 8.9 BUN of 13 creat 0.93 glucose 88 total protein 6.7 albumin 3.7 alk phos elevated at 108 AST 55 ALT 24 bilirubin elevated at 1.3 iron sat high at 64% B12 310 WBC 4.6 hemoglobin 13.3 MCV 88.5 platelet count low at 49 neutrophils low at 0.54 lymphocytes 3.11 monocytes 0.48 eosinophils up at 0.41 basophil 0.05 immature granulocytes 0.02.  Patient has appointment January 23, 2025 with Dr. Calderón. To see them tomorrow. He is to do labs in [place].  Oct 2 2024 FINDINGS: Lower Thorax: Normal. Liver: Iron deposition. Liver has a cirrhotic morphology, with a nodular contour, relative hypertrophy of the left lobe, and T2 hyperintense fine reticulations compatible with fibrosis. More conspicuous ill-defined arterial enhancing foci for example  (9:24, 34, 35) without correlate on additional sequences, likely perfusional. No suspicious liver lesions. Conventional hepatic arterial anatomy. Gallbladder/Biliary Tree: Single gallstone with diffuse submucosal edema and wall thickening could be secondary to third spacing/volume overload. No biliary ductal dilatation. Spleen: Splenomegaly measuring 16.5 cm in the oblique craniocaudal dimension, previously 14.5 cm, when measured similarly. Similar nonenhancing punctate foci. Pancreas: Intrinsic T1 hyperintensity is slightly reduced. No main ductal dilatation.  Adrenal Glands: Normal Kidneys/Ureters: Small renal cysts including renal sinus cysts. No hydronephrosis. Symmetrical enhancement. Gastrointestinal: No bowel obstruction. Stool throughout the visualized colon. Lymph Nodes: Interval increased peripancreatic, periportal, and retroperitoneal lymph nodes, for example aortocaval lymph node conglomerate measures approximately 5.1 x 3.2 cm (4:27), previously 2.9 x 1.3 cm. Single aortocaval lymph node measures 1.6 x 3.3 cm (Series 9, Image 71), previously 1.4 x 2.9 cm Peripancreatic lymph node adjacent to the tail measures 2.8 x 1.7 cm (20:20), previously 2 x 1.1 cm. Partially visualized enlarged cardiophrenic (measuring 9 mm) and axillary lymph nodes measuring up to 2.3 cm in the right axilla, for example. Vessels: Normal caliber abdominal aorta. Encased IVC is compressed by the surrounding daniel enlargement in the retroperitoneum without hemodynamically significant stenosis. Similar encasement of the left renal vein without stenosis. Patent portal vein.  Perigastric and perisplenic varices. Peritoneum/Retroperitoneum: Mild ascites. Mesenteric edema.  Bones/Soft Tissues: Susceptibility artifact from L4-L5 spinal fixation hardware and mild degenerative changes. No suspicious enhancing osseous lesions.   IMPRESSION: 1. No suspicious hepatic lesions. Punctate arterially enhancing observations without correlate on additional sequences, favored to represent perfusion anomalies (LR-2).  2. Morphologic features of cirrhosis with stigmata of portal hypertension including mild ascites, upper abdominal varices and splenomegaly. 3. Significant increase in the size of numerous retroperitoneal lymph nodes above and below the diaphragm with increase in the degree of splenomegaly concerning for worsening lymphoproliferative process.  Asked what her plan is left the practice and he is to see a new doctor there for his this week to go over scan.  October 1: Ultrasound showed visualized pancreas portions appeared unremarkable. Liver coarsening and its echotexture and with no suspicious liver lesion. Common bile duct was 4 mm. Gallbladder wall thickening measuring 6 mm was seen which may be due to its contracted state and underlying chronic liver disease. There was a 6 mm gallstone that was seen. No gallbladder distention or pericholecystic fluid seen however. You did have small volume ascites that was seen and important that you go or stay on a low-salt diet to keep this under control. Right kidney 13.1 cm with no hydronephrosis. Liver vessels patent and splenic vessels patent as well. Spleen was enlarged at 16 cm. Overall, they felt that you had a cirrhotic liver with patent vessels and some nonspecific gallbladder wall thickening that could be related due to cirrhosis or portal hypertension. Gallstone was seen. Splenomegaly was seen. Some small volume ascites was seen.  It does appear that the ascites has gone from trace ascites in April now to small volume. We may need to talk about starting you on a diuretic (water pill) after we assess your abdomen to try to help with his fluid retention issue. Please try to be stricter on your salt content of your diet to help with these issues and hopefully try to avoid this.  Pt says he is on low salt diet.   Has not done the follow up the egd aug 2024 and has not done yet. So reminded pt to do this.  May for 2024 EGD showed grade 2 esophageal varices that were medium size in the lower third of the esophagus and 5 bands were placed. He had complete eradication resulting in deflation of varices. No bleeding seen. Mild diffuse portal hypertensive gastropathy seen in the gastric body and duodenum normal. He recommended a 3-month repeat which would have been for August but I do not see that that was done.  Last visit not on any tx yet for the CLL. Discussed if has any tx to start may need hep b suppressive tx as b core positive  Sept 20 2024 he did for Dr Hobbs: folate 13.2 and na 139 and k 4.4 and cl 107 and co2 25 and ca 8.7 and bun 17 and cr 0.89 amd glu 109 and tp 6.8 and alb 3.6 and alk 115 and ast 57 and alt 28 and tb 1.1 and iron sat 9% and low and needs iron (says he was given iron iv for this x 2).  b12 265 and  wbc 8.5 and hg 11.5 little low and hct 39.4 and platelets 45.  neutrophils 2.05 and lymphs 5.22 elevated.   April 12 labs showed glucose elevated at 137 and previously was elevated at 126. Please share with primary provider. He was fasting and he will look at with primary. Bun of 18 creatinine 0.84 sodium 140 potassium 4.9 calcium 9.0 albumin 4.3 bilirubin 0.8 alk phos 120 with AST 62 up from 52 last time and ALT 29. Unfortunately since June of last year your AST went from 33-52-62. Are you on a new medicine? Have you been ill recently? No new reason. WBC 7.3 which is stable for you hemoglobin a little lower at 11.6 from 12.7 in January. MCV normal at 79 but still just at the lower limit of normal. Platelets 56,000 and previously 58,000. Neutrophils normal at 1.9 the lymphocytes a little up at 4.1. INR normal at 1.1.  April 9 ultrasound shows pancreas not well-seen due to gas and body habitus and very limited assessment unremarkable. The liver continues to be coarsened echotexture with no lesions. Liver vessels were patent as expected. Mild gallbladder wall thickening seen at 6 mm but no pericholecystic fluid and an echogenic gallstone was seen.  They suspect that this could be related due to chronic liver disease and less likely due to gallbladder inflammation.  They recommended for us to correlate with clinical history such as if you have symptoms or not of any pain. Common bile duct was normal at 4 mm. Right kidney 12.4 cm with no hydronephrosis. Spleen is mildly enlarged at 14.7 cm and you have trace ascites in the perisplenic space.  Very important that you maintain a low-salt diet with this history. Splenic vessels were patent as expected. In summary, the liver is coarsened echotexture but with no lesions and consistent with chronic liver disease/cirrhosis etiology.  Nonspecific gallbladder wall thickening that could be related due to chronic liver disease or less likely due to gallbladder issues.  Please let us know if you having any symptoms.   Doug 3 labs show sugar up at 126 and possibly not fasting this day? BUN of 13 and creatinine 0.78 both normal. Sodium 141 potassium 4.1 albumin 4.5 bilirubin 0.9 alk phos 96 with AST up a little at 52 and ALT of 34 and previously AST 33 and ALT 22. Sometimes patients with fatty liver near the holidays go up a little underweight or become less active and that can usually cause this to worsen in the winter months. WBC 7.3 hemoglobin a little low but higher at 12.7 from 11.5 previously. MCV was low normal at 79. The trend is getting a little lower which makes me worry if you are getting close to being iron deficient? Platelet count was low at 58 from previous 66. Neutrophils normal at 1.9 but lymphocytes up at 4.4. Previously they have been 2.9 and prior to that 3.1. INR 1.2 which is normal range still. Meld low at 8 and meld na 8.   Dec 28 2023 : partially visualized pancreas unremarkable. Coarsened echotexture of the liver parenchyma with surface nodularity and compatible with cirrhosis. No focal liver mass. Mild thickening of the gallbladder wall without pericholecystic fluid and similar to prior study. Some gallstones seen up to 6mm. Common bile duct 4mm and normal. Right kidney 12.4cm and no hydronephrosis. Spleen 14.8cm. Trace perisplenic ascites seen. Need you to stay on low salt diet with this issue. Doppler vessels patent. In summary, cirrhosis and portal hypertension changes seen in the form of splenomegaly and trace ascites. No focal mass.  Gallstones seen without acute cholecystitits and apparent mild gallbaldder wall thickening and no pericholecystic fluid favoring due to chronic liver disease.  Doppler vessels patent but hepatic aretery slightly decreased in resistive index and nonspecific.  Need liver labs to correlate with this.  Pt says he is on low salt diet. He lives near [place] so we can do the u.s in [employer] and do visit to review that.  June 9 labs show glucose of 84 which is normal this time. Previously had been elevated at 118 on a comparison labs that they gave us from 2 years ago. Please share with primary provider to be aware of. BUN of 14 creatinine slightly low at 0.71 sodium 140 potassium 4.1. Chloride slightly up at 107 and may reflect hydrational state that day. Calcium 8.7 albumin 4.0 bilirubin 0.7 alk phos 87 with an AST of 33 and ALT of 22 which appeared to be lower than the comparison labs that they gave on the current sample but again from 2 years ago. Ideal ALT is less than 35. WBC 5.7 hemoglobin was low though at 11.5 hematocrit low at 35.5 with a previous comparison values from 2 years ago being normal. Platelet count was slightly higher now at 66 from previous comparison level of 63. MCV normal at 83 and neutrophils 2.1 and lymphocytes 2.9. INR normal at 1.2. From the note, we saw some more recent labs that you did with Dr. Hobbs that it showed that your hemoglobin likewise was higher at 14.7 and it is clearly lower now platelets were 57,000 then. Liver labs were AST of 36 and ALT of 25 so those are very close to these.  The main difference appears to be that your hemoglobin is a little lower and I would asked that you follow-up with Dr. Jessica Hobbs your hematologist regarding this issue.  Dr Hobbs to see May and told all is ok.  May 30 2023 ultrasound shows liver is mildly increased/coarsened in echotexture but with no suspicious lesions. Common bile duct was normal at 3.1 mm. A likely small 6 mm calculus/stone was seen in the gallbladder. They felt that the gallbladder wall measurement of 4.2 mm was a technical error estimation. No pericholecystic fluid was seen. Pancreatic tail was not seen due to gas and that the image pancreas was otherwise grossly unremarkable. Right kidney was 12.3 cm with no stones or hydronephrosis. Spleen was enlarged at 14 cm with no splenic lesions. No ascites was seen. Liver vessels were patent as expected. Splenic vessels were patent. In summary, they felt that you had an increase/coarsened hepatic echotexture in keeping with your history of fatty liver and early chronic parenchymal liver disease. No suspicious lesions were seen. Probable 6 mm gallstone seen with no evidence of acute cholecystitis that they could clearly see. Splenomegaly was seen as well. Of note, on your previous ultrasound last year you had a 6 mm stone also seen in the gallbladder and your spleen measured 14.2 cm then.  Last labs he did from Dr Hobbs were 2m ago.  2023 labs done by Dr. Jessica Hobbs show AFP normal at 2.6.  sodium 137 potassium 3.7 chloride 105 CO2 25 BUN 17 creatinine 0.73 glucose 230 albumin 4.2 alk phos 84 AST 36 ALT 25 bilirubin 1.2 elevated folate 16.9 and ferritin 37 B12 447 iron sat 19% WBC 5.2 hemoglobin 14.7 plate count 57 MCV 83.6  He sees her in 3m and so she will see soon and do the full labs then,  Jan 2023 ct Narrative & Impression EXAM: CT ANGIO CHEST W IV CONTRAST, CT ANGIO ABDOMEN PELVIS W IV CONTRAST  CLINICAL INDICATION: TAVR/progress trial.  TECHNIQUE: Non-ECG-gated CT images of the chest, abdomen and pelvis were obtained with nonionic intravenous contrast according to CT angiogram protocol. Advanced offline 3D post-processing was performed utilizing a dedicated 3D workstation. There were no immediate complications reported. If applicable, point-of-care testing?was approved following departmental protocol.  COMPARISON: 1/28/2019.  FINDINGS:  VASCULAR: 3 leaflet aortic valve however, there is leaflet thickening and calcification and appearances are compatible with at least moderate aortic stenosis. Normal caliber aortic root. No coronary artery anomaly. Moderately severe coronary artery calcification. Normal caliber thoracic aorta with no significant atherosclerotic calcification or atheromatous plaque. No significant tortuosity. Coronary artery bypass grafting with left internal mammary and at least one saphenous vein bypass grafts. 3 vessel aortic arch. The branch vessels are normal caliber patent. Normal caliber abdominal aorta with mild gross atherosclerotic calcification. No significant plaque. No significant tortuosity. Patent celiac axis, SMA, bilateral renal arteries and accessory right renal artery and GERALDO. Normal caliber iliac and common femoral arteries with moderate atherosclerotic calcification. Focal calcified plaque with in the mid right external iliac artery with a luminal diameter minimally measuring 7 mm. Calcified plaque within the left common iliac artery. The luminal diameter minimally measures 6 mm.  CHEST: The lower neck and thoracic inlet are unremarkable.  Heart size is within normal limits. Normal caliber central pulmonary arteries. Small patent foramen ovale. Minor mitral valve annular calcification. No pericardial effusion or thickening.  There are a few subcentimeter lymph nodes. There are borderline enlarged axillary lymph nodes with the largest axillary lymph node on the right measuring 18 mm (in maximum short axis diameter) and on the left 12 mm. Lymph node enlargement is new compared to the prior study. Appearances may represent reactive lymph nodes. Is there a history of lymphoma proliferative disorder such as CLL/SLL?  The central airways are patent. No new or growing nodules calcified old granulomatous disease. No pleural effusion or thickening.  ABDOMEN/PELVIS: No focal abnormality within the liver. No biliary duct dilatation. Tiny calcified calculus within the gallbladder. No cholecystitis. Splenomegaly. The spleen measures 15 cm, 12 cm on the previous study.  The adrenal glands are unremarkable. Normal size kidneys with good cortical thickness and symmetric enhancement.  The pancreas is unremarkable. Minor diverticulosis without diverticulitis. The bowel and mesentery are otherwise on remarkable.  Unremarkable urinary bladder. Normal size prostate.  No free fluid. No lymph node enlargement.  BONES/SOFT TISSUES: Sternotomy wires. Degenerative change. Osteophytes. Screws within the lumbar spine. No aggressive osseous lesion.   IMPRESSION: 3 leaflet aortic valve however, there is leaflet thickening and calcification and appearances are compatible with at least moderate aortic stenosis.  Moderately severe coronary artery calcification. Coronary artery bypass grafting with left internal mammary and at least one saphenous vein bypass grafts.  Normal caliber thoracic aorta with no significant atherosclerotic calcification or atheromatous plaque. No significant tortuosity.  Normal caliber abdominal aorta with mild gross atherosclerotic calcification. No significant plaque. No significant tortuosity.  Normal caliber iliac and common femoral arteries with moderate atherosclerotic calcification. Focal calcified plaque within the mid right external iliac artery with a luminal diameter minimally measuring 7 mm. Calcified plaque within the left common iliac artery. The luminal diameter minimally measures 6 mm.  New bilateral axillary lymph node enlargement is an interval enlargement of the spleen. Appearances are compatible with a lymphoproliferative disorder, specifically CLL/SLL.   Jan 2023 Dr Sesay visit: Hi risk for SAVR due to age, comorbidity, redo Agree with evaluation for Progress moderate AS trial Lio Sesay MD  He says since then June 13 and maybe doing valve surgery then.  March 20 egd Findings: Grade II varices were found in the middle third of the esophagus and in the lower third of the esophagus. They were medium in size. Six bands were successfully placed with complete eradication, resulting in deflation of varices. There was no bleeding during and at the end of the procedure. Estimated blood loss was minimal. The stomach was normal. The examined duodenum was normal.  Impression: - Grade II esophageal varices. Completely eradicated. Banded. - Normal stomach. - Normal examined duodenum. - No specimens collected. Recommendation: - Perform a colonoscopy today. - Repeat upper endoscopy in 3 months for retreatment. He does the scopes with Dr Tomlin to for the follow up.  March 20 colon: Findings: The perianal and digital rectal examinations were normal. The terminal ileum appeared normal. The colon (entire examined portion) appeared normal. Non-bleeding internal hemorrhoids were found during retroflexion. The hemorrhoids were mild.  Impression: - The examined portion of the ileum was normal. - The entire examined colon is normal. - Non-bleeding internal hemorrhoids. - No specimens collected. Recommendation: - Discharge patient to home (via wheelchair). - Repeat colonoscopy in 5 years for surveillance. - Return to GI office PRN.  He was last seen on February 15, 2023 by Jyoti Diehl PA-C to talk about EGD surveillance. Last EGD had been in 2021 and it showed grade 1 varices in patchy mildly erythematous mucosa without bleeding in the gastric antrum. EGD and colonoscopy were top be set up but apparently they were set up for March 27 the patient called on February 17 to do it sooner elsewhere.  October 2022: [place] labs sent to me. Sodium was 138, potassium 3.9 chloride 105 CO2 23 BUN of 14 creatinine 0.66 calcium 9.2 glucose elevated at 112. Albumin 4.3 alkaline phosphatase 84 AST 37 ALT 31 with ideal ALT less than 35. Bilirubin elevated 1.4 but not fractionated. INR normal at 1.19. Neutrophils 2.39 lymphocytes 3.13. White blood cell count 6.5 hemoglobin elevated 16.2 hematocrit elevated 50.4 platelet count 60,000. MCV 85.3 please share with primary provider. February labs showed hemoglobin elevated at 16.4 so very similar to this and now slightly lower. AST was 38 and ALT 37 last time. Please share labs with primary provider. Meld 10 and meld na 10 so remaining low.  Oct 25: u.s: pancreas normal in size. Liver is heterogeneous in echogenicity. No definite focal hepatic abnormality. Normal doppler flow. Vessels patent. Gallbladder showed a 6mm stone in the neck of the gallbladder. Common bile duct 3-4 mm and right kidney 12cm. Spleen 14.2cm enlarged. Vessels patent. Overall, heterogeneous liver. 6mm stone seen in the neck of gallbladder. Splenomegaly. Patent vessels. On the prior u.s 4mm stone size was mentioned.  Mri: april 11 2022: liver with chronic liver disease and lobar distribution and nodular contour. No hcc. Vessel patent. varices near esoph and stomach and spleen. spleen 14.5cm. Pancreas normal. Enalrged ln seen and mildly inc. Comaptible with CLL.  April 22 ultrasound shows the liver to have a cirrhotic morphology. It appears to be heterogeneous in echotexture with mild lobulation of the contour. Common bile duct measured 4 mm. There was an echogenic focus in the dependent portion of the gallbladder without shadowing that they felt could be a small stone. No ascites was seen. Pancreas were visualized appeared unremarkable. Right kidney measured 11.5 cm and had normal echogenicity and no hydronephrosis. Liver vessels were patent. Overall they felt that you had a cirrhotic appearing liver with patent vessels and mild splenomegaly. Gallstones seen but without mention of inflammation. We plan to redo this in 6 months  Hematology is watching him for now with Dr Hobbs and he is to see her next month.  feb 11 2022 na 138 and k 4 and cl 104 and co2 27 and rosa 16 and cr 0.82 and alb 4.4 and tb 1.1 and alk 77 and ast 38 and alt 37 and ldh 228. wbc 7.4 hg 16.4 plat 59. No inr.  Reports continued better control with DM since starting Farxiga 10mg A1c running at approximately still at 6-7% per pt and this is unchanged in 2023. Does not require regular insulin just prn and off the metformin.  Weight stable and he is weighing 147 at home and 152 here and prior 155 and at 148.  HLD controlled with lipitor 20 mg.  He does consume alcohol 1-2 drink per year and was encouraged to stop alcohol entirely if possible.  Received COVID Vaccine x 4 (moderna).  Did egd in jan 2021 and grade 1 varices noted. Per dr. tomlin note recommend repeat in july 2022 and he has not done this. Dr Tomlin and he has to make it.  US 4/27/21- needs repeat in 6 months (Oct 2021). Denies abdominal distention, weight gain, confusion, GI bleeding, fevers/chills, abdominal pain.  9/22/21- glucose 118 elevated, creatinine 0.79, tbili 0.7, alk phos 116, ast 47 (prev 53), alt 39 (prev 50)- ideal is less than 35, wbc 8.7, rbc 5.95 (please show primary MD), platelets 63 (prev 86), inr 1.1, sodium 144, MELD 7, MELD-Na 7  4/23/21 tb 1.1 alt 49 alp 84 ast 45  April 27 ultrasound shows the liver to be coarsened with mild nodularity but no discrete liver lesion. Gallstones are seen but without any gallbladder wall thickening or pericholecystic fluid. Common bile duct is 2.4 mm. Pancreas appeared normal to them. Right kidney appeared to be normal in echogenicity with no renal mass. Spleen was 12.8 cm. Liver vessels were patent. Overall the liver appeared to be coarsened with no focal lesion. Spleen was upper normal. I wanted to clarify the size of your right kidney and will await that correction. They corrected the right kidney size to 11.5cm.  RECAP: he did a recent u/s-cirrhosis noted on imaging. no lesions. past CT * Final Report *  Reason For Exam Splenomegaly; h/o CLL;Splenomegaly;Cirrhosis of liver  REPORT EXAM: CT Abdomen w/ IV Contrast  CLINICAL INDICATION: Splenomegaly; h/o CLL;Splenomegaly;Cirrhosis of liver.  TECHNIQUE: Following administration of non-ionic IV contrast, postcontrast images through the abdomen were obtained. If applicable, point-of-care testing was approved following departmental protocol. [id number]  COMPARISON: 1/28/2019  FINDINGS: Lower Thorax: The visualized lung bases are clear. Punctate pleural calcification on the left posteriorly. Coronary artery calcification.  Liver: Hepatic steatosis with a nodular surface contour and fissural widening, unchanged.  Gallbladder/Biliary Tree: Cholelithiasis. No biliary ductal dilatation or gallbladder wall thickening.  Spleen: The spleen measures 13.1 cm in length, previously 11.6 cm at the same level.  Pancreas: Unremarkable.  Adrenal Glands: Unremarkable.  Kidneys/Ureters: Symmetric renal enhancement without obstruction.  Gastrointestinal: No bowel obstruction. Mild, nonspecific wall thickening in the gastric antral pyloric region.  Lymph Nodes: Multiple retroperitoneal lymph nodes are slightly larger. One left para-aortic node (5:33) measures 13 mm short axis, previously 9 mm. One aortocaval node (5:36) measures 9 mm short axis, previously 5 mm. Additional nodes are slightly larger.  Vessels: Moderate atherosclerosis.  Peritoneum/Retroperitoneum: No free fluid.  Bones/Soft Tissues: Osteopenia with degenerative and postsurgical changes in the spine.  IMPRESSION:  1. Slight increase in the size of the spleen. 2. Slight increased size of multiple retroperitoneal lymph nodes. 3. Nonspecific thickening of the gastric antral pylorus wall. 4. Hepatic steatosis with a nodular surface contour and fissural widening consistent with chronic liver disease.    9/15/20 na 141 k 4.6 gluc 109 cr 0.85 tb 1.2 alt 36 ast 39 alp 84  Plan: 1. TAF is working and clinically allowing tx for cll and keeping b core slient. Do hepb lab now. 2. Pt will do scan with [facility] soon. 3. Pt will see us in 3m. 4. Pt seeing heme onc.   Duration of the visit was 55  minutes with 20 minutes of chart prep and looking up [facility] info and  loading to ecw of his outpt labs and scans and 35 minutes for the face to face today with time spent reviewing their prior and recent records and labs and discussing their current status and future plans for care for the patient and his family.

## 2025-05-21 NOTE — EXAM-PHYSICAL EXAM
Gen: awake and responsive. Eyes: anicteric, normal lids. Mouth: mask on Nose: mask on Hearing: intact grossly. Neck: trachea midline and no jvd. CV: RRR no s3. Noted systolic murmur 3/6. Lungs: clear. No wheezes, Abd: Soft, nabs, NR, NT.Spleen tip trace palpable. No clear fluid wave. Ext: no sig edema, some palm erythema. Neuro: moves all 4 ext grossly. No asterixis. Skin: no pruritis and some palm erythema. Arm ecchymosis changes

## 2025-05-24 ENCOUNTER — TELEPHONE ENCOUNTER (OUTPATIENT)
Dept: URBAN - METROPOLITAN AREA CLINIC 86 | Facility: CLINIC | Age: 75
End: 2025-05-24

## 2025-05-24 NOTE — HPI-TODAY'S VISIT:
Dear Shelia Lima,    May 21 hepatitis B surface antigen remains negative.    This means antiHBV medicine is working to control this.   Very happy to see this.    Dr Brady

## 2025-05-25 ENCOUNTER — TELEPHONE ENCOUNTER (OUTPATIENT)
Dept: URBAN - METROPOLITAN AREA CLINIC 86 | Facility: CLINIC | Age: 75
End: 2025-05-25

## 2025-05-25 NOTE — HPI-TODAY'S VISIT:
Dear Shelia Lima,    May 21 hbv dna not detected so again confirming HBV medicine keeping things under control.   Dr Brady

## 2025-05-29 ENCOUNTER — OFFICE VISIT (OUTPATIENT)
Dept: URBAN - METROPOLITAN AREA CLINIC 86 | Facility: CLINIC | Age: 75
End: 2025-05-29

## 2025-06-14 ENCOUNTER — ERX REFILL RESPONSE (OUTPATIENT)
Dept: URBAN - METROPOLITAN AREA CLINIC 86 | Facility: CLINIC | Age: 75
End: 2025-06-14

## 2025-06-14 RX ORDER — FUROSEMIDE 20 MG/1
TAKE 1 TABLET BY MOUTH DAILY TABLET ORAL
Qty: 30 TABLET | Refills: 0

## 2025-06-14 RX ORDER — SPIRONOLACTONE 50 MG/1
TAKE 1 TABLET BY MOUTH DAILY TABLET ORAL
Qty: 30 TABLET | Refills: 0

## 2025-06-18 ENCOUNTER — ERX REFILL RESPONSE (OUTPATIENT)
Dept: URBAN - METROPOLITAN AREA CLINIC 86 | Facility: CLINIC | Age: 75
End: 2025-06-18

## 2025-06-18 RX ORDER — TENOFOVIR ALAFENAMIDE 25 MG/1
1 TABLET WITH FOOD TABLET ORAL ONCE A DAY
Qty: 90 TABLET | Refills: 0

## 2025-08-21 ENCOUNTER — LAB OUTSIDE AN ENCOUNTER (OUTPATIENT)
Dept: URBAN - METROPOLITAN AREA CLINIC 86 | Facility: CLINIC | Age: 75
End: 2025-08-21

## 2025-08-21 ENCOUNTER — OFFICE VISIT (OUTPATIENT)
Dept: URBAN - METROPOLITAN AREA CLINIC 86 | Facility: CLINIC | Age: 75
End: 2025-08-21
Payer: MEDICARE

## 2025-08-21 DIAGNOSIS — R18.8 ASCITES: ICD-10-CM

## 2025-08-21 DIAGNOSIS — I85.10 SECONDARY ESOPHAGEAL VARICES WITHOUT BLEEDING: ICD-10-CM

## 2025-08-21 DIAGNOSIS — Z87.09 HX OF INTRINSIC ASTHMA: ICD-10-CM

## 2025-08-21 DIAGNOSIS — R74.8 ABNORMAL LIVER ENZYMES: ICD-10-CM

## 2025-08-21 DIAGNOSIS — E55.9 VITAMIN D DEFICIENCY: ICD-10-CM

## 2025-08-21 DIAGNOSIS — R93.2 ABNORMAL GALLBLADDER ULTRASOUND: ICD-10-CM

## 2025-08-21 DIAGNOSIS — D69.6 THROMBOCYTOPENIA: ICD-10-CM

## 2025-08-21 DIAGNOSIS — M67.40 GANGLION CYST: ICD-10-CM

## 2025-08-21 DIAGNOSIS — R76.8 HEPATITIS B CORE ANTIBODY POSITIVE: ICD-10-CM

## 2025-08-21 DIAGNOSIS — E11.9 DIABETES: ICD-10-CM

## 2025-08-21 DIAGNOSIS — K76.0 FATTY LIVER: ICD-10-CM

## 2025-08-21 DIAGNOSIS — K57.90 DIVERTICULOSIS: ICD-10-CM

## 2025-08-21 DIAGNOSIS — K74.69 OTHER CIRRHOSIS OF LIVER: ICD-10-CM

## 2025-08-21 DIAGNOSIS — R93.3 ABNORMAL CT SCAN, STOMACH: ICD-10-CM

## 2025-08-21 DIAGNOSIS — Z79.899 HIGH RISK MEDICATION USE: ICD-10-CM

## 2025-08-21 DIAGNOSIS — C91.10 CLL (CHRONIC LYMPHOCYTIC LEUKEMIA): ICD-10-CM

## 2025-08-21 DIAGNOSIS — K31.89 INTESTINAL METAPLASIA OF GASTRIC CARDIA: ICD-10-CM

## 2025-08-21 DIAGNOSIS — I85.00 ESOPHAGEAL VARICES WITHOUT BLEEDING, UNSPECIFIED ESOPHAGEAL VARICES TYPE: ICD-10-CM

## 2025-08-21 DIAGNOSIS — Z12.11 SCREEN FOR COLON CANCER: ICD-10-CM

## 2025-08-21 DIAGNOSIS — I10 HYPERTENSION: ICD-10-CM

## 2025-08-21 DIAGNOSIS — Z71.89 VACCINE COUNSELING: ICD-10-CM

## 2025-08-21 DIAGNOSIS — E78.5 HYPERLIPIDEMIA: ICD-10-CM

## 2025-08-21 PROCEDURE — 99215 OFFICE O/P EST HI 40 MIN: CPT

## 2025-08-21 RX ORDER — ASPIRIN 81 MG/1
1 TABLET TABLET, COATED ORAL ONCE A DAY
Status: ACTIVE | COMMUNITY

## 2025-08-21 RX ORDER — DAPAGLIFLOZIN 10 MG/1
1 TABLET TABLET, FILM COATED ORAL ONCE A DAY
Status: ACTIVE | COMMUNITY

## 2025-08-21 RX ORDER — SYRINGE-NEEDLE,INSULIN,0.5 ML 30 G X1/2"
USE 3 SYRINGES A DAY TO INJECT INSULIN SYRINGE, EMPTY DISPOSABLE MISCELLANEOUS
Qty: 100 | Refills: 0 | Status: ACTIVE | COMMUNITY

## 2025-08-21 RX ORDER — METOPROLOL TARTRATE 25 MG/1
TAKE 0.5 TABLET BY ORAL ROUTE ONCE DAILY TABLET, FILM COATED ORAL 1
Refills: 0 | Status: ACTIVE | COMMUNITY
Start: 1900-01-01

## 2025-08-21 RX ORDER — TENOFOVIR ALAFENAMIDE 25 MG/1
1 TABLET WITH FOOD TABLET ORAL ONCE A DAY
Qty: 30 TABLET | Refills: 3 | OUTPATIENT
Start: 2025-08-17 | End: 2025-12-15

## 2025-08-21 RX ORDER — FUROSEMIDE 20 MG/1
TAKE 1 TABLET BY MOUTH DAILY TABLET ORAL
Qty: 30 TABLET | Refills: 0 | Status: ACTIVE | COMMUNITY

## 2025-08-21 RX ORDER — SPIRONOLACTONE 50 MG/1
TAKE 1 TABLET BY MOUTH DAILY TABLET ORAL
Qty: 30 TABLET | Refills: 0 | Status: ACTIVE | COMMUNITY

## 2025-08-21 RX ORDER — CALCIUM CARBONATE 500(1250)
2 TABLETS TABLET ORAL ONCE A DAY
Status: ACTIVE | COMMUNITY

## 2025-08-21 RX ORDER — TENOFOVIR ALAFENAMIDE 25 MG/1
1 TABLET WITH FOOD TABLET ORAL ONCE A DAY
Qty: 90 TABLET | Refills: 0 | Status: ACTIVE | COMMUNITY

## 2025-08-21 RX ORDER — INSULIN LISPRO 100 [IU]/ML
(BG-100)/40 = #UNITS FOR BLOOG GLUCOSE GREATER THAN 180 SUBCUTANEOUSLY BEFORE BREAKFAST, LUNCH, AND DINNER INJECTION, SOLUTION INTRAVENOUS; SUBCUTANEOUS
Qty: 10 | Refills: 0 | Status: ACTIVE | COMMUNITY

## 2025-08-21 RX ORDER — ATORVASTATIN CALCIUM 20 MG/1
1 TABLET TABLET, FILM COATED ORAL ONCE A DAY
Status: ACTIVE | COMMUNITY